# Patient Record
Sex: FEMALE | Race: WHITE | Employment: FULL TIME | URBAN - METROPOLITAN AREA
[De-identification: names, ages, dates, MRNs, and addresses within clinical notes are randomized per-mention and may not be internally consistent; named-entity substitution may affect disease eponyms.]

---

## 2017-01-05 ENCOUNTER — OFFICE VISIT (OUTPATIENT)
Dept: FAMILY MEDICINE CLINIC | Facility: CLINIC | Age: 37
End: 2017-01-05

## 2017-01-05 VITALS
WEIGHT: 132.38 LBS | SYSTOLIC BLOOD PRESSURE: 118 MMHG | HEART RATE: 82 BPM | DIASTOLIC BLOOD PRESSURE: 76 MMHG | HEIGHT: 67 IN | BODY MASS INDEX: 20.78 KG/M2 | RESPIRATION RATE: 16 BRPM

## 2017-01-05 DIAGNOSIS — Z00.00 LABORATORY EXAM ORDERED AS PART OF ROUTINE GENERAL MEDICAL EXAMINATION: ICD-10-CM

## 2017-01-05 DIAGNOSIS — Z51.81 THERAPEUTIC DRUG MONITORING: ICD-10-CM

## 2017-01-05 DIAGNOSIS — F41.1 GAD (GENERALIZED ANXIETY DISORDER): ICD-10-CM

## 2017-01-05 DIAGNOSIS — Z30.09 GENERAL COUNSELING FOR PRESCRIPTION OF ORAL CONTRACEPTIVES: ICD-10-CM

## 2017-01-05 DIAGNOSIS — Z00.00 ROUTINE GENERAL MEDICAL EXAMINATION AT A HEALTH CARE FACILITY: Primary | ICD-10-CM

## 2017-01-05 PROCEDURE — 99395 PREV VISIT EST AGE 18-39: CPT | Performed by: FAMILY MEDICINE

## 2017-01-05 PROCEDURE — 99213 OFFICE O/P EST LOW 20 MIN: CPT | Performed by: FAMILY MEDICINE

## 2017-01-05 RX ORDER — ESCITALOPRAM OXALATE 10 MG/1
10 TABLET ORAL DAILY
Qty: 30 TABLET | Refills: 5 | Status: SHIPPED | OUTPATIENT
Start: 2017-01-05 | End: 2017-08-10

## 2017-01-05 RX ORDER — ERGOCALCIFEROL 1.25 MG/1
CAPSULE ORAL
Refills: 0 | COMMUNITY
Start: 2016-12-01 | End: 2017-03-20 | Stop reason: ALTCHOICE

## 2017-01-05 RX ORDER — LEVONORGESTREL / ETHINYL ESTRADIOL AND ETHINYL ESTRADIOL 150-30(84)
1 KIT ORAL DAILY
Qty: 91 TABLET | Refills: 4 | Status: SHIPPED | OUTPATIENT
Start: 2017-01-05 | End: 2018-04-02

## 2017-01-05 NOTE — PROGRESS NOTES
HPI:   Sami Chatman is a 39year old female who presents for a complete physical exam, anxiety, OCP refill. Symptoms: denies discharge, itching, burning or dysuria, periods are regular.    Last PAP: 2015  Abnormal PAP: years ago  Sexually active: no ELECTRD      COLPOSCOPY,LOOP ELECTRD CERVIX EXCIS      COLPOSCOPY, CERVIX INC UPPER/ADJACENT VAGINA        Family History   Problem Relation Age of Onset   • acute MI[other] [OTHER] Maternal Grandfather    • Heart Disorder Maternal Grandfather    • HTN[oth adenopathy/thyromegaly/masses  LUNGS: clear to auscultation bilateral, no rales, rhonchi or wheezing  CARDIO: RRR without murmur normal S1S2  ABD:  normal bowel sounds,soft, non tender, no masses, HSM or tenderness  MUSCULOSKELETAL: gait normal, no gross M generic Lexapro from 5 mg to 10 mg daily. If doing well okay to wait to follow-up in 6 months. Follow-up sooner if needed. - escitalopram 10 MG Oral Tab; Take 1 tablet (10 mg total) by mouth daily. Dispense: 30 tablet;  Refill: 5            Orders Place

## 2017-01-05 NOTE — PATIENT INSTRUCTIONS
Routine Healthcare for Women   Routine checkups can find treatable problems early. For many medical problems, early treatment can help prevent more serious complications. The value of checkups and how often you have them depend mainly on your age.  Your per history of high cholesterol. Colorectal cancer test: if you are 48 or older.  Recommended tests include a yearly test for blood in the stool, called the fecal occult blood test (FOBT) or fecal immunochemical test (FIT), and one of the following tests:   s history. Many other tests are often done at routine checkups, but there is no current evidence that they are helpful as routine screening tests for healthy women. Examples of such tests are a CBC (complete blood count), thyroid tests, and urine tests.  Wh medical condition, such as diabetes. Varicella (chickenpox) if you have never had chickenpox. Zoster (shingles) vaccine: if you are 60 or older. The vaccine can help prevent shingles. It can also reduce the pain caused by shingles.    What other things

## 2017-03-20 ENCOUNTER — OFFICE VISIT (OUTPATIENT)
Dept: FAMILY MEDICINE CLINIC | Facility: CLINIC | Age: 37
End: 2017-03-20

## 2017-03-20 VITALS
TEMPERATURE: 99 F | OXYGEN SATURATION: 97 % | HEART RATE: 110 BPM | WEIGHT: 134 LBS | BODY MASS INDEX: 21.03 KG/M2 | RESPIRATION RATE: 16 BRPM | DIASTOLIC BLOOD PRESSURE: 66 MMHG | HEIGHT: 67 IN | SYSTOLIC BLOOD PRESSURE: 108 MMHG

## 2017-03-20 DIAGNOSIS — J01.10 ACUTE FRONTAL SINUSITIS, RECURRENCE NOT SPECIFIED: Primary | ICD-10-CM

## 2017-03-20 DIAGNOSIS — H73.891 ERYTHEMA OF TYMPANIC MEMBRANE, RIGHT: ICD-10-CM

## 2017-03-20 PROCEDURE — 99214 OFFICE O/P EST MOD 30 MIN: CPT | Performed by: FAMILY MEDICINE

## 2017-03-20 RX ORDER — BENZONATATE 200 MG/1
200 CAPSULE ORAL 3 TIMES DAILY PRN
Qty: 20 CAPSULE | Refills: 0 | Status: SHIPPED | OUTPATIENT
Start: 2017-03-20 | End: 2017-08-10 | Stop reason: ALTCHOICE

## 2017-03-20 RX ORDER — AZITHROMYCIN 250 MG/1
TABLET, FILM COATED ORAL
Qty: 6 TABLET | Refills: 0 | Status: SHIPPED | OUTPATIENT
Start: 2017-03-20 | End: 2017-08-10 | Stop reason: ALTCHOICE

## 2017-03-20 NOTE — PATIENT INSTRUCTIONS
-- rest, fluids, humidifier, honey  -- continue mucinex as needed  -- start zpak  --benzonatate as needed for cough up to 3x/day  -- call or followup if not improving or worsening

## 2017-03-20 NOTE — PROGRESS NOTES
CC:  Saul Damon is a 39year old female here for Patient presents with:  URI: sinus pressure, sinus headache, cough, feeling tired, and ear pain x 4 weeks       HPI:     URI  -started 4 wks ago  -associated with sinus pressure, headache, cough, and Screening for malignant neoplasm of the bladder    • Dizziness and giddiness    • Pain in joint, shoulder region 5/8/12     right shoulder   • Disorders of bursae and tendons in shoulder region, unspecified 5/8/12   • Bell's palsy 8/15/2013          Past S indicates understanding of these issues and agrees to the plan. The patient is asked to return in prn.   Maty Stoddard MD

## 2017-08-10 ENCOUNTER — OFFICE VISIT (OUTPATIENT)
Dept: FAMILY MEDICINE CLINIC | Facility: CLINIC | Age: 37
End: 2017-08-10

## 2017-08-10 VITALS
BODY MASS INDEX: 21.29 KG/M2 | HEIGHT: 67 IN | WEIGHT: 135.63 LBS | HEART RATE: 87 BPM | SYSTOLIC BLOOD PRESSURE: 118 MMHG | DIASTOLIC BLOOD PRESSURE: 78 MMHG

## 2017-08-10 DIAGNOSIS — Z51.81 THERAPEUTIC DRUG MONITORING: ICD-10-CM

## 2017-08-10 DIAGNOSIS — F41.1 GAD (GENERALIZED ANXIETY DISORDER): ICD-10-CM

## 2017-08-10 PROCEDURE — 99213 OFFICE O/P EST LOW 20 MIN: CPT | Performed by: FAMILY MEDICINE

## 2017-08-10 RX ORDER — ESCITALOPRAM OXALATE 10 MG/1
10 TABLET ORAL DAILY
Qty: 30 TABLET | Refills: 0 | Status: SHIPPED | OUTPATIENT
Start: 2017-08-10 | End: 2017-08-10

## 2017-08-10 RX ORDER — ESCITALOPRAM OXALATE 10 MG/1
10 TABLET ORAL DAILY
Qty: 90 TABLET | Refills: 1 | Status: SHIPPED | OUTPATIENT
Start: 2017-08-10 | End: 2018-02-12

## 2017-08-10 NOTE — PROGRESS NOTES
Sharyn Noyola is a 40year old female. HPI:     RONNIE:  Doing very well with increase dose of escitalopram from 5mg to 10 mg 6 months ago. Sleeping well. Denies mood swings.   23yo daughter going to college in [facility] this fall, she will be living o Cigarettes     Quit date: 6/12/2014  Smokeless tobacco: Never Used                      Alcohol use: No                     REVIEW OF SYSTEMS:   Review of Systems   Constitutional:        Feels well overall   Respiratory: Negative.     Cardiovascular: Negat Visit:  Signed Prescriptions Disp Refills    escitalopram 10 MG Oral Tab 90 tablet 1      Sig: Take 1 tablet (10 mg total) by mouth daily. Return in about 6 months (around 2/10/2018) for Chronic Conditions and as needed.

## 2017-08-10 NOTE — PATIENT INSTRUCTIONS
Understanding Generalized Anxiety Disorder (RONNIE)  Anxiety can fill you with worry and fear. Sometimes anxiety is healthy. It alerts you to a potential threat and prompts you to respond and take action.  But, for some people, anxiety gets so bad it causes © 1630-8977 [address], [address]. All rights reserved. This information is not intended as a substitute for professional medical care. Always follow your healthcare professional's instructions.

## 2018-01-30 ENCOUNTER — OFFICE VISIT (OUTPATIENT)
Dept: FAMILY MEDICINE CLINIC | Facility: CLINIC | Age: 38
End: 2018-01-30

## 2018-01-30 VITALS
SYSTOLIC BLOOD PRESSURE: 112 MMHG | BODY MASS INDEX: 25.02 KG/M2 | WEIGHT: 143 LBS | TEMPERATURE: 98 F | HEIGHT: 63.23 IN | HEART RATE: 92 BPM | DIASTOLIC BLOOD PRESSURE: 72 MMHG

## 2018-01-30 DIAGNOSIS — J02.9 SORE THROAT: Primary | ICD-10-CM

## 2018-01-30 DIAGNOSIS — J06.9 VIRAL URI: ICD-10-CM

## 2018-01-30 DIAGNOSIS — J98.01 BRONCHOSPASM: ICD-10-CM

## 2018-01-30 LAB
CONTROL LINE PRESENT WITH A CLEAR BACKGROUND (YES/NO): YES YES/NO
STREP GRP A CUL-SCR: NEGATIVE

## 2018-01-30 PROCEDURE — 99213 OFFICE O/P EST LOW 20 MIN: CPT | Performed by: FAMILY MEDICINE

## 2018-01-30 PROCEDURE — 87081 CULTURE SCREEN ONLY: CPT | Performed by: FAMILY MEDICINE

## 2018-01-30 PROCEDURE — 87880 STREP A ASSAY W/OPTIC: CPT | Performed by: FAMILY MEDICINE

## 2018-01-30 RX ORDER — ALBUTEROL SULFATE 90 UG/1
2 AEROSOL, METERED RESPIRATORY (INHALATION) EVERY 4 HOURS PRN
Qty: 1 INHALER | Refills: 0 | Status: SHIPPED | OUTPATIENT
Start: 2018-01-30 | End: 2018-02-12

## 2018-01-30 NOTE — PROGRESS NOTES
HPI:   Saul Damon is a 40year old female who presents for upper respiratory symptoms for  3  days.  Patient reports sore throat mild nasal congestion, laryngitis today, fever very low grade, mild chills bodyaches, ear mild pain, no cough, no shortn Packs/day: 0.30      Years: 15.00        Types: Cigarettes     Quit date: 6/12/2014  Smokeless tobacco: Never Used                      Alcohol use:  No                  REVIEW OF SYSTEMS:   GENERAL: CULT, THROAT; Future  - GRP A STREP CULT, THROAT    2. Viral URI  Bronchospasm  Albuterol 2 puffs every 4-6 hours as needed as needed. Patient states that she has a tendency to get some mild bronchospasm when she gets a cold.   Guaifenesin generic (Mucinex

## 2018-02-12 ENCOUNTER — OFFICE VISIT (OUTPATIENT)
Dept: FAMILY MEDICINE CLINIC | Facility: CLINIC | Age: 38
End: 2018-02-12

## 2018-02-12 VITALS
BODY MASS INDEX: 25.55 KG/M2 | SYSTOLIC BLOOD PRESSURE: 120 MMHG | HEIGHT: 63.23 IN | HEART RATE: 88 BPM | DIASTOLIC BLOOD PRESSURE: 72 MMHG | RESPIRATION RATE: 16 BRPM | WEIGHT: 146 LBS

## 2018-02-12 DIAGNOSIS — Z51.81 THERAPEUTIC DRUG MONITORING: Primary | ICD-10-CM

## 2018-02-12 DIAGNOSIS — F41.1 GAD (GENERALIZED ANXIETY DISORDER): ICD-10-CM

## 2018-02-12 PROCEDURE — 99213 OFFICE O/P EST LOW 20 MIN: CPT | Performed by: FAMILY MEDICINE

## 2018-02-12 RX ORDER — ESCITALOPRAM OXALATE 10 MG/1
10 TABLET ORAL DAILY
Qty: 90 TABLET | Refills: 1 | Status: SHIPPED | OUTPATIENT
Start: 2018-02-12 | End: 2018-05-03

## 2018-02-13 NOTE — PROGRESS NOTES
Tina Sumner is a 40year old female. HPI:     RONNIE:  Doing very well. Sleeping well. Denies mood swings. Current Outpatient Prescriptions:  escitalopram 10 MG Oral Tab Take 1 tablet (10 mg total) by mouth daily.  Disp: 90 tablet Rfl: 1 120/72 (BP Location: Left arm, Patient Position: Sitting, Cuff Size: adult)   Pulse 88   Resp 16   Ht 63.23\"   Wt 146 lb   LMP 12/13/2017   BMI 25.67 kg/m²  Estimated body mass index is 25.67 kg/m² as calculated from the following:    Height as of this en

## 2018-03-14 ENCOUNTER — CHARTING TRANS (OUTPATIENT)
Dept: OTHER | Age: 38
End: 2018-03-14

## 2018-04-02 ENCOUNTER — OFFICE VISIT (OUTPATIENT)
Dept: FAMILY MEDICINE CLINIC | Facility: CLINIC | Age: 38
End: 2018-04-02

## 2018-04-02 VITALS
DIASTOLIC BLOOD PRESSURE: 76 MMHG | RESPIRATION RATE: 16 BRPM | BODY MASS INDEX: 23.22 KG/M2 | HEIGHT: 66.54 IN | WEIGHT: 146.19 LBS | SYSTOLIC BLOOD PRESSURE: 110 MMHG | HEART RATE: 80 BPM

## 2018-04-02 DIAGNOSIS — Z12.4 SCREENING FOR MALIGNANT NEOPLASM OF CERVIX: ICD-10-CM

## 2018-04-02 DIAGNOSIS — Z86.19 HISTORY OF HPV INFECTION: ICD-10-CM

## 2018-04-02 DIAGNOSIS — Z00.00 ROUTINE GENERAL MEDICAL EXAMINATION AT A HEALTH CARE FACILITY: ICD-10-CM

## 2018-04-02 DIAGNOSIS — J01.00 ACUTE NON-RECURRENT MAXILLARY SINUSITIS: ICD-10-CM

## 2018-04-02 DIAGNOSIS — Z30.09 GENERAL COUNSELING FOR PRESCRIPTION OF ORAL CONTRACEPTIVES: ICD-10-CM

## 2018-04-02 DIAGNOSIS — J30.2 SEASONAL ALLERGIC RHINITIS, UNSPECIFIED TRIGGER: ICD-10-CM

## 2018-04-02 DIAGNOSIS — Z23 NEED FOR VACCINATION: ICD-10-CM

## 2018-04-02 DIAGNOSIS — N84.1 CERVICAL POLYP: ICD-10-CM

## 2018-04-02 DIAGNOSIS — Z01.411 ENCOUNTER FOR GYNECOLOGICAL EXAMINATION WITH ABNORMAL FINDING: Primary | ICD-10-CM

## 2018-04-02 DIAGNOSIS — Z00.00 LABORATORY EXAM ORDERED AS PART OF ROUTINE GENERAL MEDICAL EXAMINATION: ICD-10-CM

## 2018-04-02 PROCEDURE — 90471 IMMUNIZATION ADMIN: CPT | Performed by: FAMILY MEDICINE

## 2018-04-02 PROCEDURE — 99395 PREV VISIT EST AGE 18-39: CPT | Performed by: FAMILY MEDICINE

## 2018-04-02 PROCEDURE — 99213 OFFICE O/P EST LOW 20 MIN: CPT | Performed by: FAMILY MEDICINE

## 2018-04-02 PROCEDURE — 88175 CYTOPATH C/V AUTO FLUID REDO: CPT | Performed by: FAMILY MEDICINE

## 2018-04-02 PROCEDURE — 90715 TDAP VACCINE 7 YRS/> IM: CPT | Performed by: FAMILY MEDICINE

## 2018-04-02 PROCEDURE — 87624 HPV HI-RISK TYP POOLED RSLT: CPT | Performed by: FAMILY MEDICINE

## 2018-04-02 RX ORDER — MONTELUKAST SODIUM 10 MG/1
10 TABLET ORAL NIGHTLY
Qty: 90 TABLET | Refills: 1 | Status: SHIPPED | OUTPATIENT
Start: 2018-04-02 | End: 2018-05-03

## 2018-04-02 RX ORDER — METHYLPREDNISOLONE 4 MG/1
TABLET ORAL
Qty: 1 KIT | Refills: 0 | Status: SHIPPED | OUTPATIENT
Start: 2018-04-02 | End: 2018-05-03 | Stop reason: ALTCHOICE

## 2018-04-02 RX ORDER — AZITHROMYCIN 250 MG/1
TABLET, FILM COATED ORAL
Qty: 6 TABLET | Refills: 0 | Status: SHIPPED | OUTPATIENT
Start: 2018-04-02 | End: 2018-05-03 | Stop reason: ALTCHOICE

## 2018-04-02 RX ORDER — LEVONORGESTREL / ETHINYL ESTRADIOL AND ETHINYL ESTRADIOL 150-30(84)
1 KIT ORAL DAILY
Qty: 91 TABLET | Refills: 4 | Status: SHIPPED | OUTPATIENT
Start: 2018-04-02 | End: 2019-05-25

## 2018-04-02 NOTE — PROGRESS NOTES
HPI:   Miller Castaneda is a 40year old female who presents for her annual wellness visit, upper respiratory symptoms, and allergies. Symptoms: denies discharge, itching, burning or dysuria, periods are irregular.    Last PAP: 10/2015  Abnormal PAP: ye tablet Rfl: 1   Levonorgest-Eth Estrad 91-Day (CAMRESE) 0.15-0.03 &0.01 MG Oral Tab Take 1 tablet by mouth daily. Disp: 91 tablet Rfl: 4   escitalopram 10 MG Oral Tab Take 1 tablet (10 mg total) by mouth daily.  Disp: 90 tablet Rfl: 1   Hydroxychloroquine S abdominal pain, bowel movement changes, blood in stool  : denies urinary problems, vaginal discharge or discomfort,  periods regular no  MUSCULOSKELETAL: denies joint pain or stiffness  NEURO: denies headaches, tingling or dizziness  PSYCH: denies depres Date(s) Administered    TDAP                  04/02/2018          ASSESSMENT AND PLAN:   Bryon Tariq is a 40year old female who presents for a complete physical exam.        Encounter for gynecological examination with abnormal finding  (primary en THIN PREP COLLECTION; Future  - THINPREP PAP SMEAR B  - HPV HIGH RISK , THIN PREP COLLECTION    8. Screening for malignant neoplasm of cervix  - THINPREP PAP SMEAR B; Future  - HPV HIGH RISK , THIN PREP COLLECTION;  Future  - THINPREP PAP SMEAR B  - HPV HIG Take 1 tablet (10 mg total) by mouth nightly. Levonorgest-Eth Estrad 91-Day (CAMRESE) 0.15-0.03 &0.01 MG Oral Tab 91 tablet 4      Sig: Take 1 tablet by mouth daily.            Imaging & Consults:  TETANUS, DIPHTHERIA TOXOIDS AND ACELLULAR PERTUSIS VAC

## 2018-04-07 ENCOUNTER — LAB ENCOUNTER (OUTPATIENT)
Dept: LAB | Age: 38
End: 2018-04-07
Attending: FAMILY MEDICINE
Payer: COMMERCIAL

## 2018-04-07 DIAGNOSIS — J30.2 SEASONAL ALLERGIC RHINITIS, UNSPECIFIED TRIGGER: ICD-10-CM

## 2018-04-07 DIAGNOSIS — Z00.00 LABORATORY EXAM ORDERED AS PART OF ROUTINE GENERAL MEDICAL EXAMINATION: ICD-10-CM

## 2018-04-07 PROCEDURE — 82306 VITAMIN D 25 HYDROXY: CPT | Performed by: FAMILY MEDICINE

## 2018-04-07 PROCEDURE — 80050 GENERAL HEALTH PANEL: CPT | Performed by: FAMILY MEDICINE

## 2018-04-07 PROCEDURE — 84439 ASSAY OF FREE THYROXINE: CPT | Performed by: FAMILY MEDICINE

## 2018-04-07 PROCEDURE — 80061 LIPID PANEL: CPT | Performed by: FAMILY MEDICINE

## 2018-04-07 PROCEDURE — 86003 ALLG SPEC IGE CRUDE XTRC EA: CPT | Performed by: FAMILY MEDICINE

## 2018-04-07 PROCEDURE — 82785 ASSAY OF IGE: CPT | Performed by: FAMILY MEDICINE

## 2018-04-07 PROCEDURE — 36415 COLL VENOUS BLD VENIPUNCTURE: CPT | Performed by: FAMILY MEDICINE

## 2018-05-03 ENCOUNTER — OFFICE VISIT (OUTPATIENT)
Dept: FAMILY MEDICINE CLINIC | Facility: CLINIC | Age: 38
End: 2018-05-03

## 2018-05-03 VITALS
WEIGHT: 143.81 LBS | DIASTOLIC BLOOD PRESSURE: 72 MMHG | HEART RATE: 88 BPM | BODY MASS INDEX: 22.84 KG/M2 | HEIGHT: 66.54 IN | SYSTOLIC BLOOD PRESSURE: 114 MMHG

## 2018-05-03 DIAGNOSIS — J30.89 PERENNIAL ALLERGIC RHINITIS WITH SEASONAL VARIATION: ICD-10-CM

## 2018-05-03 DIAGNOSIS — Z51.81 THERAPEUTIC DRUG MONITORING: Primary | ICD-10-CM

## 2018-05-03 DIAGNOSIS — J01.00 ACUTE NON-RECURRENT MAXILLARY SINUSITIS: ICD-10-CM

## 2018-05-03 DIAGNOSIS — E55.9 VITAMIN D DEFICIENCY: ICD-10-CM

## 2018-05-03 DIAGNOSIS — J30.2 PERENNIAL ALLERGIC RHINITIS WITH SEASONAL VARIATION: ICD-10-CM

## 2018-05-03 DIAGNOSIS — F41.1 GAD (GENERALIZED ANXIETY DISORDER): ICD-10-CM

## 2018-05-03 PROCEDURE — 99214 OFFICE O/P EST MOD 30 MIN: CPT | Performed by: FAMILY MEDICINE

## 2018-05-03 RX ORDER — MONTELUKAST SODIUM 10 MG/1
10 TABLET ORAL NIGHTLY
Qty: 90 TABLET | Refills: 3 | Status: SHIPPED | OUTPATIENT
Start: 2018-05-03 | End: 2019-05-25

## 2018-05-03 RX ORDER — ACETAMINOPHEN 160 MG
2000 TABLET,DISINTEGRATING ORAL DAILY
COMMUNITY
End: 2019-04-30

## 2018-05-03 RX ORDER — ESCITALOPRAM OXALATE 10 MG/1
10 TABLET ORAL DAILY
Qty: 90 TABLET | Refills: 3 | Status: SHIPPED | OUTPATIENT
Start: 2018-05-03 | End: 2019-04-30 | Stop reason: DRUGHIGH

## 2018-05-03 NOTE — PROGRESS NOTES
Nissa Fonseca is a 40year old female. HPI:     Allergic rhinitis:  Allergy symptoms on the Singulair have improved. Allergy test results most significant for cats. Allergy test also positive for dogs and dust mites.   Patient does have cats and Packs/day: 0.30      Years: 15.00        Types: Cigarettes     Quit date: 6/12/2014  Smokeless tobacco: Never Used                      Alcohol use:  No                  Family History   Problem Relation Age of Onset   • acute MI[other] [O Albumin 4.1 3.5 - 4.8 g/dL   Sodium 142 136 - 144 mmol/L   Potassium 3.9 3.6 - 5.1 mmol/L   Chloride 107 101 - 111 mmol/L   CO2 27.0 22.0 - 32.0 mmol/L   -LIPID PANEL   Collection Time: 04/07/18  7:07 AM   Result Value Ref Range   Cholesterol, Total 183 Allergen, Natasha Avers IgE <0.10 <=0.34 kU/L   Allergen, D.  Farinae IgE 2.96 (H) <=0.34 kU/L   Allergen, Syriac Cockroach IgE <0.10 <=0.34 kU/L   Allergen, Box Elder/Maple IgE <0.10 <=0.34 kU/L   Immunoglobulin E 41 <=214 kU/L   Allergen, Hormodendrum IgE patient will continue the Singulair. Okay to take OTC antihistamines as well. Avoid known triggers. - Montelukast Sodium 10 MG Oral Tab; Take 1 tablet (10 mg total) by mouth nightly. Dispense: 90 tablet; Refill: 3    4.  Vitamin D deficiency  Patient

## 2018-05-24 ENCOUNTER — OFFICE VISIT (OUTPATIENT)
Dept: OBGYN CLINIC | Facility: CLINIC | Age: 38
End: 2018-05-24

## 2018-05-24 VITALS
WEIGHT: 145 LBS | DIASTOLIC BLOOD PRESSURE: 70 MMHG | HEIGHT: 66 IN | BODY MASS INDEX: 23.3 KG/M2 | SYSTOLIC BLOOD PRESSURE: 110 MMHG

## 2018-05-24 DIAGNOSIS — N84.1 CERVICAL POLYP: Primary | ICD-10-CM

## 2018-05-24 PROCEDURE — 88305 TISSUE EXAM BY PATHOLOGIST: CPT | Performed by: OBSTETRICS & GYNECOLOGY

## 2018-05-24 PROCEDURE — 99204 OFFICE O/P NEW MOD 45 MIN: CPT | Performed by: OBSTETRICS & GYNECOLOGY

## 2018-05-24 NOTE — PROGRESS NOTES
[facility]  Obstetrics and Gynecology Referral  History & Physical    CC: Patient is a new patient and referred for cervical polyp     Subjective:     HPI: Cintia Doyle is a 40year old  female referred for cervical polyp.  Patient re ELECTRD CERVIX EXCIS  No date: CONIZATION CERVIX,LOOP ELECTRD    Social History:    Social History  Social History   Marital status: Single  Spouse name: N/A    Years of education: N/A  Number of children: N/A     Occupational History  None on file     Soc was informed of risks including but not limited to the risk of bleeding, infection, injury and insufficient tissue collection. All questions and concerns were addressed.  The patient provided verbal and written consent.      The patient was placed in a supi on counseling/coordination of care: 40 min    Total time spent with patient: 40 min        RTC in PRN or sooner if needed     Liliya Queen MD   [facility] - OBGYN

## 2018-11-01 VITALS
HEART RATE: 85 BPM | WEIGHT: 144 LBS | DIASTOLIC BLOOD PRESSURE: 80 MMHG | SYSTOLIC BLOOD PRESSURE: 120 MMHG | RESPIRATION RATE: 18 BRPM | HEIGHT: 67 IN | BODY MASS INDEX: 22.6 KG/M2 | TEMPERATURE: 99.1 F | OXYGEN SATURATION: 98 %

## 2019-04-30 ENCOUNTER — OFFICE VISIT (OUTPATIENT)
Dept: FAMILY MEDICINE CLINIC | Facility: CLINIC | Age: 39
End: 2019-04-30
Payer: COMMERCIAL

## 2019-04-30 VITALS
OXYGEN SATURATION: 99 % | WEIGHT: 147 LBS | SYSTOLIC BLOOD PRESSURE: 118 MMHG | HEART RATE: 95 BPM | DIASTOLIC BLOOD PRESSURE: 68 MMHG | HEIGHT: 66 IN | BODY MASS INDEX: 23.63 KG/M2

## 2019-04-30 DIAGNOSIS — J30.1 SEASONAL ALLERGIC RHINITIS DUE TO POLLEN: ICD-10-CM

## 2019-04-30 DIAGNOSIS — F41.1 GAD (GENERALIZED ANXIETY DISORDER): ICD-10-CM

## 2019-04-30 DIAGNOSIS — Z51.81 THERAPEUTIC DRUG MONITORING: Primary | ICD-10-CM

## 2019-04-30 PROCEDURE — 99214 OFFICE O/P EST MOD 30 MIN: CPT | Performed by: FAMILY MEDICINE

## 2019-04-30 RX ORDER — IPRATROPIUM BROMIDE 42 UG/1
2 SPRAY, METERED NASAL 3 TIMES DAILY
Qty: 1 BOTTLE | Refills: 3 | Status: SHIPPED | OUTPATIENT
Start: 2019-04-30 | End: 2020-12-28

## 2019-04-30 RX ORDER — FLUTICASONE PROPIONATE 50 MCG
2 SPRAY, SUSPENSION (ML) NASAL DAILY
Qty: 1 BOTTLE | Refills: 3 | Status: SHIPPED | OUTPATIENT
Start: 2019-04-30

## 2019-04-30 RX ORDER — ESCITALOPRAM OXALATE 20 MG/1
20 TABLET ORAL DAILY
Qty: 30 TABLET | Refills: 3 | Status: SHIPPED | OUTPATIENT
Start: 2019-04-30 | End: 2019-06-15

## 2019-04-30 RX ORDER — ALBUTEROL SULFATE 90 UG/1
2 AEROSOL, METERED RESPIRATORY (INHALATION) EVERY 6 HOURS PRN
COMMUNITY
End: 2021-01-08

## 2019-04-30 NOTE — PATIENT INSTRUCTIONS
Okay to take OTC Claritin in addition to the montelukast.    Make an appointment to see the counselor.

## 2019-04-30 NOTE — PROGRESS NOTES
Pool Oswald is a 45year old female. HPI:       Anxiety:  Worsening anxiety. Pt has a counselor in mind that is in network that she plans on seeing. Currently taking Lexapro 10 mg daily.   Tolerating medication well, no side effects to the [facility] COLPOSCOPY, CERVIX INC UPPER/ADJACENT VAGINA     • COLPOSCOPY,LOOP ELECTRD CERVIX EXCIS     • CONIZATION CERVIX,LOOP ELECTRD        Social History:    Social History    Tobacco Use      Smoking status: Former Smoker        Packs/day: 0.30        Years: 13. mm/S3/S4  GI: normal bowel sounds, NT/ND, no pulsations, no r/r/g, no masses, no HSM  EXTREMITIES: no cyanosis or clubbing  NEURO: Alert and Oriented x3, CN II-XII grossly intact, no focal weakness  PSYCH: affect normal, normal thought content.           AS (around 6/11/2019) for Therapeutic drug monitoring and increase of Lexapro/escitalopram.

## 2019-05-25 DIAGNOSIS — Z30.09 GENERAL COUNSELING FOR PRESCRIPTION OF ORAL CONTRACEPTIVES: ICD-10-CM

## 2019-05-28 RX ORDER — LEVONORGESTREL / ETHINYL ESTRADIOL AND ETHINYL ESTRADIOL 150-30(84)
KIT ORAL
Qty: 1 PACKAGE | Refills: 0 | Status: SHIPPED | OUTPATIENT
Start: 2019-05-28 | End: 2019-08-27

## 2019-05-28 RX ORDER — MONTELUKAST SODIUM 10 MG/1
TABLET ORAL
Qty: 30 TABLET | Refills: 0 | Status: SHIPPED | OUTPATIENT
Start: 2019-05-28 | End: 2019-05-29

## 2019-05-29 RX ORDER — MONTELUKAST SODIUM 10 MG/1
TABLET ORAL
Qty: 90 TABLET | Refills: 0 | Status: SHIPPED | OUTPATIENT
Start: 2019-05-29 | End: 2019-06-15

## 2019-06-15 ENCOUNTER — OFFICE VISIT (OUTPATIENT)
Dept: FAMILY MEDICINE CLINIC | Facility: CLINIC | Age: 39
End: 2019-06-15
Payer: COMMERCIAL

## 2019-06-15 VITALS
HEIGHT: 66 IN | WEIGHT: 143 LBS | SYSTOLIC BLOOD PRESSURE: 110 MMHG | DIASTOLIC BLOOD PRESSURE: 64 MMHG | BODY MASS INDEX: 22.98 KG/M2 | HEART RATE: 96 BPM

## 2019-06-15 DIAGNOSIS — J30.89 PERENNIAL ALLERGIC RHINITIS: ICD-10-CM

## 2019-06-15 DIAGNOSIS — Z79.899 ENCOUNTER FOR LONG-TERM CURRENT USE OF MEDICATION: ICD-10-CM

## 2019-06-15 DIAGNOSIS — F41.1 GAD (GENERALIZED ANXIETY DISORDER): ICD-10-CM

## 2019-06-15 DIAGNOSIS — Z51.81 THERAPEUTIC DRUG MONITORING: Primary | ICD-10-CM

## 2019-06-15 DIAGNOSIS — J01.00 ACUTE NON-RECURRENT MAXILLARY SINUSITIS: ICD-10-CM

## 2019-06-15 PROCEDURE — 99214 OFFICE O/P EST MOD 30 MIN: CPT | Performed by: FAMILY MEDICINE

## 2019-06-15 RX ORDER — ESCITALOPRAM OXALATE 20 MG/1
20 TABLET ORAL DAILY
Qty: 90 TABLET | Refills: 1 | Status: SHIPPED | OUTPATIENT
Start: 2019-06-15 | End: 2019-12-30

## 2019-06-15 RX ORDER — AZITHROMYCIN 250 MG/1
TABLET, FILM COATED ORAL
Qty: 6 TABLET | Refills: 0 | Status: SHIPPED | OUTPATIENT
Start: 2019-06-15 | End: 2019-12-30 | Stop reason: ALTCHOICE

## 2019-06-15 RX ORDER — MONTELUKAST SODIUM 10 MG/1
10 TABLET ORAL NIGHTLY
Qty: 90 TABLET | Refills: 3 | Status: SHIPPED | OUTPATIENT
Start: 2019-06-15 | End: 2020-05-18

## 2019-06-15 NOTE — PATIENT INSTRUCTIONS
Take OTC Claritin nightly, okay to take store brand. Please make an appointment for your annual wellness visit.

## 2019-06-15 NOTE — PROGRESS NOTES
Rudy Sarmiento is a 44year old female. HPI:       Anxiety:  Much improved with increase of generic Lexapro to 20 mg daily. Seeing a counselor. Tolerating medication well, no side effects to the medication noticed. Allergies: Worsening.   Ta 5/3/2018      Past Surgical History:   Procedure Laterality Date   • COLPOSCOPY, CERVIX INC UPPER/ADJACENT VAGINA     • COLPOSCOPY,LOOP ELECTRD CERVIX EXCIS     • CONIZATION CERVIX,LOOP ELECTRD        Social History:    Social History    Tobacco Use      S mm/S3/S4  GI: normal bowel sounds, NT/ND, no pulsations, no r/r/g, no masses, no HSM  EXTREMITIES: no cyanosis or clubbing  NEURO: Alert and Oriented x3, CN II-XII grossly intact, no focal weakness  PSYCH: affect normal, normal thought content.           AS

## 2019-08-27 DIAGNOSIS — Z30.09 GENERAL COUNSELING FOR PRESCRIPTION OF ORAL CONTRACEPTIVES: ICD-10-CM

## 2019-08-27 RX ORDER — LEVONORGESTREL / ETHINYL ESTRADIOL AND ETHINYL ESTRADIOL 150-30(84)
KIT ORAL
Qty: 91 TABLET | Refills: 0 | Status: SHIPPED | OUTPATIENT
Start: 2019-08-27 | End: 2019-12-30

## 2019-08-27 NOTE — TELEPHONE ENCOUNTER
Pt requesting refill of LEVONORGEST-ETH ESTRAD 91-DAY 0.15-0.03 &0.01 MG Oral Tab, passed protocol , refill approved, sent to pharmacy

## 2019-12-30 ENCOUNTER — OFFICE VISIT (OUTPATIENT)
Dept: FAMILY MEDICINE CLINIC | Facility: CLINIC | Age: 39
End: 2019-12-30
Payer: COMMERCIAL

## 2019-12-30 VITALS
WEIGHT: 142 LBS | HEIGHT: 67 IN | OXYGEN SATURATION: 97 % | SYSTOLIC BLOOD PRESSURE: 114 MMHG | TEMPERATURE: 99 F | BODY MASS INDEX: 22.29 KG/M2 | DIASTOLIC BLOOD PRESSURE: 66 MMHG | HEART RATE: 95 BPM

## 2019-12-30 DIAGNOSIS — Z00.00 ROUTINE GENERAL MEDICAL EXAMINATION AT A HEALTH CARE FACILITY: Primary | ICD-10-CM

## 2019-12-30 DIAGNOSIS — Z30.09 GENERAL COUNSELING FOR PRESCRIPTION OF ORAL CONTRACEPTIVES: ICD-10-CM

## 2019-12-30 DIAGNOSIS — R06.2 INSPIRATORY WHEEZE ON EXAMINATION: ICD-10-CM

## 2019-12-30 DIAGNOSIS — Z12.31 ENCOUNTER FOR SCREENING MAMMOGRAM FOR MALIGNANT NEOPLASM OF BREAST: ICD-10-CM

## 2019-12-30 DIAGNOSIS — R09.89 ABNORMAL LUNG SOUNDS: ICD-10-CM

## 2019-12-30 PROBLEM — Z01.411 ENCOUNTER FOR GYNECOLOGICAL EXAMINATION WITH ABNORMAL FINDING: Status: RESOLVED | Noted: 2018-04-02 | Resolved: 2019-12-30

## 2019-12-30 PROCEDURE — 99214 OFFICE O/P EST MOD 30 MIN: CPT | Performed by: FAMILY MEDICINE

## 2019-12-30 PROCEDURE — 94640 AIRWAY INHALATION TREATMENT: CPT | Performed by: FAMILY MEDICINE

## 2019-12-30 PROCEDURE — 99395 PREV VISIT EST AGE 18-39: CPT | Performed by: FAMILY MEDICINE

## 2019-12-30 RX ORDER — LEVONORGESTREL / ETHINYL ESTRADIOL AND ETHINYL ESTRADIOL 150-30(84)
1 KIT ORAL DAILY
Qty: 91 TABLET | Refills: 3 | Status: SHIPPED | OUTPATIENT
Start: 2019-12-30 | End: 2019-12-31

## 2019-12-30 RX ORDER — ALBUTEROL SULFATE 2.5 MG/3ML
2.5 SOLUTION RESPIRATORY (INHALATION) ONCE
Status: COMPLETED | OUTPATIENT
Start: 2019-12-30 | End: 2019-12-30

## 2019-12-30 RX ORDER — ALPRAZOLAM 0.25 MG/1
0.25 TABLET ORAL DAILY PRN
COMMUNITY

## 2019-12-30 RX ADMIN — ALBUTEROL SULFATE 2.5 MG: 2.5 SOLUTION RESPIRATORY (INHALATION) at 18:24:00

## 2019-12-31 RX ORDER — LEVONORGESTREL / ETHINYL ESTRADIOL AND ETHINYL ESTRADIOL 150-30(84)
KIT ORAL
Qty: 91 TABLET | Refills: 1 | Status: SHIPPED | OUTPATIENT
Start: 2019-12-31 | End: 2020-05-12

## 2019-12-31 NOTE — PATIENT INSTRUCTIONS
Prevention Guidelines, Women Ages 25 to 44  Screening tests and vaccines are an important part of managing your health. A screening test is done to find possible disorders or diseases in people who don't have any symptoms.  The goal is to find a disease e Type 2 diabetes, prediabetes All women diagnosed with gestational diabetes Lifelong testing every 3 years   Type 2 diabetes All women with prediabetes Every year   Gonorrhea Sexually active women at increased risk for infection At routine exams   Hepatitis Measles, mumps, rubella (MMR) All women in this age group who have no record of these infections or vaccines 1 or 2 doses   Meningococcal Women at increased risk for infection should talk with their healthcare provider 1 or more doses   Pneumococcal conjug © 1951-5515 [address] [facility], [facility] [place]. All rights reserved. This information is not intended as a substitute for professional medical care. Always follow your healthcare professional's instructions.

## 2019-12-31 NOTE — TELEPHONE ENCOUNTER
Pt requesting refill of LEVONORGEST-ETH ESTRAD 91-DAY 0.15-0.03 &0.01 MG Oral Tab    Passed protocol, refill approved, sent to pharmacy

## 2019-12-31 NOTE — PROGRESS NOTES
HPI:   Brittani Amado is a 44year old female   Symptoms: sometimes has break through bleeding on the OCP  Last PAP: 2018  Abnormal PAP: 9-10 years ago, normal thereafter    Abnormal lung sounds heard on physical exam, when discussing this with patient puffs into the lungs every 6 (six) hours as needed for Wheezing. • Fluticasone Propionate 50 MCG/ACT Nasal Suspension 2 sprays by Each Nare route daily.  (Patient not taking: Reported on 12/30/2019 ) 1 Bottle 3   • Ipratropium Bromide 0.06 % Nasal Solut abdominal pain, bowel movement changes, blood in stool  : denies urinary problems, vaginal discharge or discomfort  MUSCULOSKELETAL: denies joint pain or stiffness  NEURO: denies headaches, tingling or dizziness  PSYCH: denies depression or anxiety  HEMA for screening mammogram for malignant neoplasm of breast  Abnormal lung sounds  Inspiratory wheeze on examination      1. Routine general medical examination at a health care facility  Patient provided handout on women's health and prevention.    Recommend Health maintenance guidance provided including vision and dental exams. Patient counseled on age appropriate calcium and vitamin D intake. Lifestyle guidance provided. The patient indicates understanding of these issues and agrees to the plan.   The

## 2020-01-01 ENCOUNTER — EXTERNAL RECORD (OUTPATIENT)
Dept: HEALTH INFORMATION MANAGEMENT | Age: 40
End: 2020-01-01

## 2020-01-18 PROBLEM — R09.89 ABNORMAL LUNG SOUNDS: Status: ACTIVE | Noted: 2020-01-18

## 2020-01-18 PROBLEM — R06.2 INSPIRATORY WHEEZE ON EXAMINATION: Status: ACTIVE | Noted: 2020-01-18

## 2020-05-12 ENCOUNTER — PATIENT MESSAGE (OUTPATIENT)
Dept: FAMILY MEDICINE CLINIC | Facility: CLINIC | Age: 40
End: 2020-05-12

## 2020-05-12 DIAGNOSIS — Z30.09 GENERAL COUNSELING FOR PRESCRIPTION OF ORAL CONTRACEPTIVES: ICD-10-CM

## 2020-05-12 RX ORDER — LEVONORGESTREL / ETHINYL ESTRADIOL AND ETHINYL ESTRADIOL 150-30(84)
1 KIT ORAL DAILY
Qty: 91 TABLET | Refills: 2 | Status: SHIPPED | OUTPATIENT
Start: 2020-05-12 | End: 2020-09-10

## 2020-05-12 NOTE — TELEPHONE ENCOUNTER
From: Tina Sumner  To: Sammy Chen DO  Sent: 5/12/2020 10:58 AM CDT  Subject: Prescription Question    Good morning,    I need to have my birth control script [facility] [place]. For some reason they do not have it for the new year.

## 2020-05-14 ENCOUNTER — PATIENT MESSAGE (OUTPATIENT)
Dept: FAMILY MEDICINE CLINIC | Facility: CLINIC | Age: 40
End: 2020-05-14

## 2020-05-15 NOTE — TELEPHONE ENCOUNTER
From: Miller Castaneda  To: Nilsa Rice DO  Sent: 5/14/2020 5:55 PM CDT  Subject: Non-Urgent Medical Question    I have a question regarding these sinus headaches I keep getting. I'm going on week 2. I'm taking the singular and Claritin.  I do take s

## 2020-05-18 ENCOUNTER — VIRTUAL PHONE E/M (OUTPATIENT)
Dept: FAMILY MEDICINE CLINIC | Facility: CLINIC | Age: 40
End: 2020-05-18
Payer: COMMERCIAL

## 2020-05-18 DIAGNOSIS — J30.89 PERENNIAL ALLERGIC RHINITIS WITH SEASONAL VARIATION: ICD-10-CM

## 2020-05-18 DIAGNOSIS — J01.40 ACUTE NON-RECURRENT PANSINUSITIS: Primary | ICD-10-CM

## 2020-05-18 DIAGNOSIS — Z79.899 ENCOUNTER FOR LONG-TERM CURRENT USE OF MEDICATION: ICD-10-CM

## 2020-05-18 DIAGNOSIS — J30.2 PERENNIAL ALLERGIC RHINITIS WITH SEASONAL VARIATION: ICD-10-CM

## 2020-05-18 PROCEDURE — 99214 OFFICE O/P EST MOD 30 MIN: CPT | Performed by: FAMILY MEDICINE

## 2020-05-18 RX ORDER — SERTRALINE HYDROCHLORIDE 100 MG/1
200 TABLET, FILM COATED ORAL DAILY
COMMUNITY
Start: 2020-03-28

## 2020-05-18 RX ORDER — AZITHROMYCIN 250 MG/1
TABLET, FILM COATED ORAL
Qty: 6 TABLET | Refills: 0 | Status: SHIPPED | OUTPATIENT
Start: 2020-05-18 | End: 2020-05-23

## 2020-05-18 RX ORDER — TRAZODONE HYDROCHLORIDE 50 MG/1
TABLET ORAL
COMMUNITY
Start: 2020-04-26

## 2020-05-18 RX ORDER — MONTELUKAST SODIUM 10 MG/1
10 TABLET ORAL NIGHTLY
Qty: 90 TABLET | Refills: 3 | Status: SHIPPED | OUTPATIENT
Start: 2020-05-18 | End: 2021-07-19

## 2020-05-18 NOTE — PROGRESS NOTES
Virtual/Telephone Check-In    Main Ashleyliff verbally consents to a Virtual/Telephone Check-In service on 05/18/20.   Patient understands and accepts financial responsibility for any deductible, co-insurance and/or co-pays associated with this service nightly. 90 tablet 3   • Levonorgest-Eth Estrad 91-Day 0.15-0.03 &0.01 MG Oral Tab Take 1 tablet by mouth daily. 91 tablet 2   • ALPRAZolam 0.25 MG Oral Tab Take 0.25 mg by mouth daily as needed.      • Albuterol Sulfate HFA (PROAIR HFA) 108 (90 Base) MCG/A discharge that is crusty. HEENT: Denies sore throat. Difficult to breathe through nose at times.   LUNGS: Denies cough or shortness of breath  CARDIOVASCULAR: Denies chest pain or palpitations  GI: Denies nausea, vomiting, or diarrhea  NEURO: Denies  [facility] 0     Sig: Take 2 tablets (500 mg total) by mouth daily for 1 day, THEN 1 tablet (250 mg total) daily for 4 days. • Montelukast Sodium 10 MG Oral Tab 90 tablet 3     Sig: Take 1 tablet (10 mg total) by mouth nightly.        Return in about 7 months (Deras Griselda

## 2020-06-03 ENCOUNTER — TELEMEDICINE (OUTPATIENT)
Dept: FAMILY MEDICINE CLINIC | Facility: CLINIC | Age: 40
End: 2020-06-03
Payer: COMMERCIAL

## 2020-06-03 DIAGNOSIS — J30.89 PERENNIAL ALLERGIC RHINITIS: ICD-10-CM

## 2020-06-03 DIAGNOSIS — B02.9 HERPES ZOSTER WITHOUT COMPLICATION: Primary | ICD-10-CM

## 2020-06-03 DIAGNOSIS — J01.41 ACUTE RECURRENT PANSINUSITIS: ICD-10-CM

## 2020-06-03 PROCEDURE — 99214 OFFICE O/P EST MOD 30 MIN: CPT | Performed by: FAMILY MEDICINE

## 2020-06-03 RX ORDER — VALACYCLOVIR HYDROCHLORIDE 1 G/1
1 TABLET, FILM COATED ORAL 3 TIMES DAILY
Qty: 21 TABLET | Refills: 0 | Status: SHIPPED | OUTPATIENT
Start: 2020-06-03 | End: 2020-06-10

## 2020-06-03 RX ORDER — AMOXICILLIN AND CLAVULANATE POTASSIUM 875; 125 MG/1; MG/1
1 TABLET, FILM COATED ORAL EVERY 12 HOURS
Qty: 20 TABLET | Refills: 0 | Status: SHIPPED | OUTPATIENT
Start: 2020-06-03 | End: 2020-06-13

## 2020-06-03 RX ORDER — METHYLPREDNISOLONE 4 MG/1
TABLET ORAL
Qty: 1 KIT | Refills: 0 | Status: SHIPPED | OUTPATIENT
Start: 2020-06-03 | End: 2020-08-21 | Stop reason: ALTCHOICE

## 2020-06-03 NOTE — PROGRESS NOTES
Video visit with live two way video and audio via [address] verbally consents to a Video visit with live two way video and audio via [employer] on 06/03/20.   Patient has been referred to the [facility] website at [email or web address] t Amoxicillin-Pot Clavulanate 875-125 MG Oral Tab Take 1 tablet by mouth Q12H for 10 days. 20 tablet 0   • methylPREDNISolone (MEDROL) 4 MG Oral Tablet Therapy Pack Take each day's dose as one daily dose q am with food or milk for 6 days.  1 kit 0   • Sertral right shoulder   • Perennial allergic rhinitis with seasonal variation 5/3/2018      Social History:  Social History    Tobacco Use      Smoking status: Former Smoker        Packs/day: 0.30        Years: 15.00        Pack years: 4.5        Types: Cigarette Dispense: 1 kit; Refill: 0    2. Acute recurrent pansinusitis  Complicated by allergic rhinitis. Prescription for Augmentin. Prescription for Medrol Dosepak. - Amoxicillin-Pot Clavulanate 875-125 MG Oral Tab; Take 1 tablet by mouth Q12H for 10 days.

## 2020-06-18 ENCOUNTER — PATIENT MESSAGE (OUTPATIENT)
Dept: FAMILY MEDICINE CLINIC | Facility: CLINIC | Age: 40
End: 2020-06-18

## 2020-06-18 NOTE — TELEPHONE ENCOUNTER
From: Miller Castaneda  To: Nilsa Rice DO  Sent: 6/18/2020 2:33 PM CDT  Subject: Other    I just wanted to send a message and let you know I am doing well. My shingles are almost gone and so far no further headaches.  I forgot to make the follow up

## 2020-07-15 ENCOUNTER — HOSPITAL ENCOUNTER (OUTPATIENT)
Dept: MAMMOGRAPHY | Age: 40
Discharge: HOME | End: 2020-07-15
Attending: FAMILY MEDICINE
Payer: COMMERCIAL

## 2020-07-15 DIAGNOSIS — Z12.31 ENCOUNTER FOR SCREENING MAMMOGRAM FOR MALIGNANT NEOPLASM OF BREAST: ICD-10-CM

## 2020-07-15 PROCEDURE — 77067 SCR MAMMO BI INCL CAD: CPT | Performed by: FAMILY MEDICINE

## 2020-07-15 PROCEDURE — 77063 BREAST TOMOSYNTHESIS BI: CPT | Performed by: FAMILY MEDICINE

## 2020-07-30 ENCOUNTER — HOSPITAL ENCOUNTER (OUTPATIENT)
Dept: MAMMOGRAPHY | Age: 40
Discharge: HOME | End: 2020-07-30
Attending: FAMILY MEDICINE
Payer: COMMERCIAL

## 2020-07-30 ENCOUNTER — HOSPITAL ENCOUNTER (OUTPATIENT)
Dept: ULTRASOUND IMAGING | Age: 40
Discharge: HOME | End: 2020-07-30
Attending: FAMILY MEDICINE
Payer: COMMERCIAL

## 2020-07-30 DIAGNOSIS — R92.2 INCONCLUSIVE MAMMOGRAM: ICD-10-CM

## 2020-07-30 PROCEDURE — 77061 BREAST TOMOSYNTHESIS UNI: CPT | Performed by: FAMILY MEDICINE

## 2020-07-30 PROCEDURE — 76642 ULTRASOUND BREAST LIMITED: CPT | Performed by: FAMILY MEDICINE

## 2020-07-30 PROCEDURE — 77065 DX MAMMO INCL CAD UNI: CPT | Performed by: FAMILY MEDICINE

## 2020-08-21 ENCOUNTER — VIRTUAL PHONE E/M (OUTPATIENT)
Dept: FAMILY MEDICINE CLINIC | Facility: CLINIC | Age: 40
End: 2020-08-21
Payer: COMMERCIAL

## 2020-08-21 VITALS — BODY MASS INDEX: 22.92 KG/M2 | HEIGHT: 66 IN | WEIGHT: 142.63 LBS

## 2020-08-21 DIAGNOSIS — N92.1 METRORRHAGIA: Primary | ICD-10-CM

## 2020-08-21 DIAGNOSIS — J30.89 PERENNIAL ALLERGIC RHINITIS WITH SEASONAL VARIATION: ICD-10-CM

## 2020-08-21 DIAGNOSIS — M32.9 LUPUS (HCC): ICD-10-CM

## 2020-08-21 DIAGNOSIS — J30.2 PERENNIAL ALLERGIC RHINITIS WITH SEASONAL VARIATION: ICD-10-CM

## 2020-08-21 PROBLEM — R06.2 INSPIRATORY WHEEZE ON EXAMINATION: Status: RESOLVED | Noted: 2020-01-18 | Resolved: 2020-08-21

## 2020-08-21 PROBLEM — J01.41 ACUTE RECURRENT PANSINUSITIS: Status: RESOLVED | Noted: 2020-06-03 | Resolved: 2020-08-21

## 2020-08-21 PROBLEM — R09.89 ABNORMAL LUNG SOUNDS: Status: RESOLVED | Noted: 2020-01-18 | Resolved: 2020-08-21

## 2020-08-21 PROBLEM — B02.9 HERPES ZOSTER WITHOUT COMPLICATION: Status: RESOLVED | Noted: 2020-06-03 | Resolved: 2020-08-21

## 2020-08-21 PROCEDURE — 3008F BODY MASS INDEX DOCD: CPT | Performed by: FAMILY MEDICINE

## 2020-08-21 PROCEDURE — 99214 OFFICE O/P EST MOD 30 MIN: CPT | Performed by: FAMILY MEDICINE

## 2020-08-21 NOTE — PROGRESS NOTES
Virtual/Telephone Check-In    Lázaropascual Brad verbally consents to a Virtual/Telephone Check-In service on 08/21/20. Patient has been referred to the [facility] website at [email or web address] to review the yearly Consent to Treat document.   Patient un  of her OCP. Allergies: Worsening. Patient taking Singulair daily for the most part. Occasionally also takes an OTC antihistamine. Using Flonase regularly.         Current Outpatient Medications   Medication Sig Dispense Refill   • Sert 5/3/2018      Social History:  Social History    Tobacco Use      Smoking status: Former Smoker        Packs/day: 0.30        Years: 15.00        Pack years: 4.5        Types: Cigarettes        Quit date: 2014        Years since quittin.2      Smo consideration for tubal ligation. 2. Perennial allergic rhinitis with seasonal variation  Continue Singulair daily. Continue Flonase.   Recommend take OTC antihistamine regularly for at least a couple of weeks to see if that helps since she is having

## 2020-08-22 PROBLEM — J30.1 SEASONAL ALLERGIC RHINITIS DUE TO POLLEN: Status: RESOLVED | Noted: 2019-04-30 | Resolved: 2020-08-22

## 2020-08-22 PROBLEM — N92.1 METRORRHAGIA: Status: ACTIVE | Noted: 2020-08-22

## 2020-08-22 PROBLEM — N84.1 CERVICAL POLYP: Status: RESOLVED | Noted: 2018-04-02 | Resolved: 2020-08-22

## 2020-08-22 PROBLEM — J30.89 PERENNIAL ALLERGIC RHINITIS: Status: RESOLVED | Noted: 2019-06-15 | Resolved: 2020-08-22

## 2020-09-09 ENCOUNTER — PATIENT MESSAGE (OUTPATIENT)
Dept: FAMILY MEDICINE CLINIC | Facility: CLINIC | Age: 40
End: 2020-09-09

## 2020-09-10 ENCOUNTER — TELEPHONE (OUTPATIENT)
Dept: FAMILY MEDICINE CLINIC | Facility: CLINIC | Age: 40
End: 2020-09-10

## 2020-09-10 ENCOUNTER — VIRTUAL PHONE E/M (OUTPATIENT)
Dept: FAMILY MEDICINE CLINIC | Facility: CLINIC | Age: 40
End: 2020-09-10
Payer: COMMERCIAL

## 2020-09-10 DIAGNOSIS — G43.109 MIGRAINE WITH AURA AND WITHOUT STATUS MIGRAINOSUS, NOT INTRACTABLE: Primary | ICD-10-CM

## 2020-09-10 PROCEDURE — 99214 OFFICE O/P EST MOD 30 MIN: CPT | Performed by: FAMILY MEDICINE

## 2020-09-10 RX ORDER — METHYLPREDNISOLONE 4 MG/1
TABLET ORAL
Qty: 1 PACKAGE | Refills: 0 | Status: SHIPPED | OUTPATIENT
Start: 2020-09-10 | End: 2020-09-23 | Stop reason: ALTCHOICE

## 2020-09-10 RX ORDER — IBUPROFEN 600 MG/1
600 TABLET ORAL EVERY 8 HOURS PRN
Qty: 30 TABLET | Refills: 0 | Status: ON HOLD | OUTPATIENT
Start: 2020-09-10 | End: 2020-12-09

## 2020-09-10 RX ORDER — TIZANIDINE 4 MG/1
4 TABLET ORAL NIGHTLY
Qty: 10 TABLET | Refills: 0 | Status: SHIPPED | OUTPATIENT
Start: 2020-09-10 | End: 2020-09-23 | Stop reason: ALTCHOICE

## 2020-09-10 NOTE — TELEPHONE ENCOUNTER
From: Case Degree  To: Joseph Quinonez DO  Sent: 9/9/2020 6:58 PM CDT  Subject: Non-Urgent Medical Question    I am writing to let you know I'm starting to get really bad sinus migraines again. I've had this one for 2 days.  I'm not sure what the ne

## 2020-09-10 NOTE — PROGRESS NOTES
Virtual/Telephone Check-In    Cintia Doyle verbally consents to a Virtual/Telephone Check-In service on 09/10/20. Patient has been referred to the [facility] website at [email or web address] to review the yearly Consent to Treat document.   Patient un mouth every 8 (eight) hours as needed for Pain. 30 tablet 0   • Sertraline HCl 100 MG Oral Tab Take 100 mg by mouth daily.      • traZODone HCl 50 MG Oral Tab TAKE 1 TO 2 TABLETS BY MOUTH ONCE A DAY AT BEDTIME     • Montelukast Sodium 10 MG Oral Tab Take 1 use: No      Alcohol/week: 0.0 standard drinks    Drug use: No       REVIEW OF SYSTEMS:   GENERAL: Overall feels \"okay\"  SKIN: No new rashes.   EYES: Denies changes in vision  HEENT: As in HPI  LUNGS: Denies cough or shortness of breath  CARDIOVASCULAR: D by mouth every 8 (eight) hours as needed for Pain. Return in about 3 months (around 12/10/2020) for Annual wellness visit. Sooner if needed for migraine or other. Oren Travis

## 2020-09-10 NOTE — TELEPHONE ENCOUNTER
Chelle Ny, DO 7 minutes ago (8:07 AM)        Thank you so much! Even if I could get migraine medication I'd be ok with that. And no, not exposed to corona at all and [place]'t left [place].           You  Luis Armando SMITH 13 minutes ago (8:0

## 2020-09-10 NOTE — TELEPHONE ENCOUNTER
Dr. Bimal Guzman had a cancellation today and patient was added on to her schedule for a virtual appt. Patient agreed with plan.    Virtual appt 9/10/20 @ 2:30

## 2020-09-23 ENCOUNTER — OFFICE VISIT (OUTPATIENT)
Dept: FAMILY MEDICINE CLINIC | Facility: CLINIC | Age: 40
End: 2020-09-23
Payer: COMMERCIAL

## 2020-09-23 VITALS
DIASTOLIC BLOOD PRESSURE: 60 MMHG | SYSTOLIC BLOOD PRESSURE: 100 MMHG | HEIGHT: 67 IN | WEIGHT: 143.19 LBS | HEART RATE: 90 BPM | OXYGEN SATURATION: 97 % | BODY MASS INDEX: 22.47 KG/M2 | TEMPERATURE: 99 F

## 2020-09-23 DIAGNOSIS — Z76.89 ENCOUNTER FOR NEW MEDICATION PRESCRIPTION: ICD-10-CM

## 2020-09-23 DIAGNOSIS — G43.109 MIGRAINE WITH AURA AND WITHOUT STATUS MIGRAINOSUS, NOT INTRACTABLE: Primary | ICD-10-CM

## 2020-09-23 PROCEDURE — 99214 OFFICE O/P EST MOD 30 MIN: CPT | Performed by: FAMILY MEDICINE

## 2020-09-23 PROCEDURE — 3078F DIAST BP <80 MM HG: CPT | Performed by: FAMILY MEDICINE

## 2020-09-23 PROCEDURE — 3008F BODY MASS INDEX DOCD: CPT | Performed by: FAMILY MEDICINE

## 2020-09-23 PROCEDURE — 3074F SYST BP LT 130 MM HG: CPT | Performed by: FAMILY MEDICINE

## 2020-09-23 RX ORDER — SUMATRIPTAN 50 MG/1
TABLET, FILM COATED ORAL
Qty: 6 TABLET | Refills: 0 | Status: SHIPPED | OUTPATIENT
Start: 2020-09-23 | End: 2021-02-05

## 2020-09-23 RX ORDER — FOLIC ACID 1 MG/1
1 TABLET ORAL DAILY
COMMUNITY
Start: 2020-09-15

## 2020-09-23 RX ORDER — PREDNISONE 1 MG/1
5 TABLET ORAL
COMMUNITY
Start: 2020-09-15 | End: 2020-11-05 | Stop reason: ALTCHOICE

## 2020-09-23 NOTE — PROGRESS NOTES
Chuckie Aleman is a 36year old female. HPI:       Headaches:  Vision changes when HA is severe, blurry vision, dark spots, light sensitivity. HA also associated with nausea, close to vomiting. Pain up to 10 out of 10.   Patient frequently starts Propionate 50 MCG/ACT Nasal Suspension 2 sprays by Each Nare route daily. 1 Bottle 3   • Ipratropium Bromide 0.06 % Nasal Solution 2 sprays by Nasal route 3 (three) times daily. (Patient taking differently: 2 sprays by Nasal route 3 (three) times daily.  As Date(s) Administered    TDAP                  04/02/2018    Deferred                Date(s) Deferred    Influenza Vaccine Refused                          09/23/2020      EXAM:   /60 (BP Location: Left arm, Patient Position: Sitting, Cuff Size: adult documents the process)      Meds & Refills for this Visit:  Requested Prescriptions     Signed Prescriptions Disp Refills   • SUMAtriptan Succinate 50 MG Oral Tab 6 tablet 0     Sig: Take at first sign/symptom of migraine headache.        Imaging & Consults

## 2020-09-23 NOTE — PATIENT INSTRUCTIONS
-If you end up using the sumatriptan and to get rid of the headache, call to let Dr. Glenna Rudolph know if it worked and if you had any side effects.     -If you do not hear from the repeat referral department in 3-5 business days regarding author migraines. Some treatment options include biofeedback and acupuncture. Ask your healthcare provider to tell you more about these treatments if you have questions. · Limit caffeine. You may find that caffeine helps relieve pain during an attack.  But too mu bright or flashing lights can be triggers. · Medicine overuse. Frequent use of pain medicines for headache pain can also cause a headache. This may also be called rebound headache.   Control your triggers  These are some of the things you can do to try to

## 2020-09-24 ENCOUNTER — TELEPHONE (OUTPATIENT)
Dept: FAMILY MEDICINE CLINIC | Facility: CLINIC | Age: 40
End: 2020-09-24

## 2020-09-24 NOTE — TELEPHONE ENCOUNTER
[facility] pharmacy requesting to know what is the max dose per 24 hours for Sumatriptan for day calculation per insurance request.     SUMAtriptan Succinate 50 MG Oral Tab 6 tablet 0 9/23/2020    Sig:   Take at first sign/symptom of migraine headache.

## 2020-09-30 ENCOUNTER — HOSPITAL ENCOUNTER (OUTPATIENT)
Dept: MRI IMAGING | Age: 40
Discharge: HOME | End: 2020-09-30
Attending: FAMILY MEDICINE
Payer: COMMERCIAL

## 2020-09-30 DIAGNOSIS — G43.109 MIGRAINE WITH AURA AND WITHOUT STATUS MIGRAINOSUS, NOT INTRACTABLE: ICD-10-CM

## 2020-09-30 PROCEDURE — 70553 MRI BRAIN STEM W/O & W/DYE: CPT | Performed by: FAMILY MEDICINE

## 2020-09-30 PROCEDURE — A9575 INJ GADOTERATE MEGLUMI 0.1ML: HCPCS | Performed by: FAMILY MEDICINE

## 2020-10-04 ENCOUNTER — PATIENT MESSAGE (OUTPATIENT)
Dept: FAMILY MEDICINE CLINIC | Facility: CLINIC | Age: 40
End: 2020-10-04

## 2020-10-05 ENCOUNTER — PATIENT MESSAGE (OUTPATIENT)
Dept: FAMILY MEDICINE CLINIC | Facility: CLINIC | Age: 40
End: 2020-10-05

## 2020-10-05 NOTE — TELEPHONE ENCOUNTER
From: Oneal Carlson  To: Demarcus Carrasco DO  Sent: 10/5/2020 8:47 AM CDT  Subject: Test Results Question    The soonest available was 10/19. I was wondering if I should try to schedule with an ENT if she thinks these migraines are due to sinuses.

## 2020-10-05 NOTE — TELEPHONE ENCOUNTER
From: Rudy Sarmiento  To: Kaleigh Bustos DO  Sent: 10/4/2020 4:36 PM CDT  Subject: Test Results Question    Can I make a video visit to discuss the MRI? I'm just trying to figure out what to do next so these headaches stop. Thanks!   Salud Gomez

## 2020-10-08 ENCOUNTER — OFFICE VISIT (OUTPATIENT)
Dept: OBGYN CLINIC | Facility: CLINIC | Age: 40
End: 2020-10-08
Payer: COMMERCIAL

## 2020-10-08 ENCOUNTER — VIRTUAL PHONE E/M (OUTPATIENT)
Dept: FAMILY MEDICINE CLINIC | Facility: CLINIC | Age: 40
End: 2020-10-08
Payer: COMMERCIAL

## 2020-10-08 ENCOUNTER — TELEPHONE (OUTPATIENT)
Dept: FAMILY MEDICINE CLINIC | Facility: CLINIC | Age: 40
End: 2020-10-08

## 2020-10-08 VITALS
TEMPERATURE: 98 F | WEIGHT: 144 LBS | HEIGHT: 66 IN | SYSTOLIC BLOOD PRESSURE: 118 MMHG | BODY MASS INDEX: 23.14 KG/M2 | DIASTOLIC BLOOD PRESSURE: 78 MMHG

## 2020-10-08 DIAGNOSIS — J34.9 PARANASAL SINUS DISEASE: ICD-10-CM

## 2020-10-08 DIAGNOSIS — Z76.89 ENCOUNTER FOR NEW MEDICATION PRESCRIPTION: ICD-10-CM

## 2020-10-08 DIAGNOSIS — N92.6 IRREGULAR MENSES: ICD-10-CM

## 2020-10-08 DIAGNOSIS — Z30.2 REQUEST FOR STERILIZATION: Primary | ICD-10-CM

## 2020-10-08 DIAGNOSIS — N93.9 ABNORMAL UTERINE BLEEDING (AUB): ICD-10-CM

## 2020-10-08 DIAGNOSIS — J34.1 PARANASAL SINUS MUCOCELE: ICD-10-CM

## 2020-10-08 DIAGNOSIS — J30.2 PERENNIAL ALLERGIC RHINITIS WITH SEASONAL VARIATION: ICD-10-CM

## 2020-10-08 DIAGNOSIS — N95.1 HOT FLUSHES, PERIMENOPAUSAL: ICD-10-CM

## 2020-10-08 DIAGNOSIS — G43.109 MIGRAINE WITH AURA AND WITHOUT STATUS MIGRAINOSUS, NOT INTRACTABLE: ICD-10-CM

## 2020-10-08 DIAGNOSIS — J30.89 PERENNIAL ALLERGIC RHINITIS WITH SEASONAL VARIATION: ICD-10-CM

## 2020-10-08 DIAGNOSIS — G43.109 MIGRAINE WITH AURA AND WITHOUT STATUS MIGRAINOSUS, NOT INTRACTABLE: Primary | ICD-10-CM

## 2020-10-08 PROCEDURE — 85025 COMPLETE CBC W/AUTO DIFF WBC: CPT | Performed by: OBSTETRICS & GYNECOLOGY

## 2020-10-08 PROCEDURE — 3008F BODY MASS INDEX DOCD: CPT | Performed by: OBSTETRICS & GYNECOLOGY

## 2020-10-08 PROCEDURE — 3078F DIAST BP <80 MM HG: CPT | Performed by: OBSTETRICS & GYNECOLOGY

## 2020-10-08 PROCEDURE — 99214 OFFICE O/P EST MOD 30 MIN: CPT | Performed by: FAMILY MEDICINE

## 2020-10-08 PROCEDURE — 3074F SYST BP LT 130 MM HG: CPT | Performed by: OBSTETRICS & GYNECOLOGY

## 2020-10-08 PROCEDURE — 84443 ASSAY THYROID STIM HORMONE: CPT | Performed by: OBSTETRICS & GYNECOLOGY

## 2020-10-08 PROCEDURE — 99215 OFFICE O/P EST HI 40 MIN: CPT | Performed by: OBSTETRICS & GYNECOLOGY

## 2020-10-08 RX ORDER — TOPIRAMATE 25 MG/1
TABLET ORAL
Qty: 72 TABLET | Refills: 0 | Status: SHIPPED | OUTPATIENT
Start: 2020-10-08 | End: 2020-10-08

## 2020-10-08 RX ORDER — TOPIRAMATE 25 MG/1
TABLET ORAL
Qty: 72 TABLET | Refills: 0 | Status: SHIPPED | OUTPATIENT
Start: 2020-10-08 | End: 2020-10-28 | Stop reason: DRUGHIGH

## 2020-10-08 NOTE — PROGRESS NOTES
[facility]  Obstetrics and Gynecology  Follow Up Progress Note    Subjective:     Oz Abdalla is a 36year old  female who was last seen in office 2018 and presents with c/o request for permanent sterilization and AUB w/ irregula Meds:    •  methotrexate 2.5 MG Oral Tab, Take 10 mg by mouth every 7 days. , Disp: , Rfl:     •  folic acid 1 MG Oral Tab, Take 1 mg by mouth daily. , Disp: , Rfl:     •  predniSONE 5 MG Oral Tab, 5 mg., Disp: , Rfl:     •  SUMAtriptan Succinate 50 MG O Perennial allergic rhinitis with seasonal variation 5/3/2018       PSH:  Past Surgical History:   Procedure Laterality Date   • COLPOSCOPY, CERVIX INC UPPER/ADJACENT VAGINA     • COLPOSCOPY,LOOP ELECTRD CERVIX EXCIS     • CONIZATION CERVIX,LOOP ELECTRD bilateral salpingectomy, possibly exploratory laparotomy   - d/w risks, benefits and alternatives including but not limited to risk of infection, bleeding, injury, anestheisia and exploratory laparotomy   - discussion included the permanent nature of the p

## 2020-10-08 NOTE — TELEPHONE ENCOUNTER
topiramate 25 MG Oral Tab 72 tablet 0 10/8/2020    Sig:   Start 1 tab po qhs for 1 wk, then twice a day for 1 week, then one tab in the morning and 2 tabs qhs for week, then 2 tabs twice a day.         Per pharmacy: missing information  Drug is 1 tablets in

## 2020-10-08 NOTE — PROGRESS NOTES
Virtual/Telephone Check-In    Lisy Schwartz verbally consents to a Virtual/Telephone Check-In service on 10/08/20. Patient has been referred to the [facility] website at [email or web address] to review the yearly Consent to Treat document.   Patient un Tab Take 0.25 mg by mouth daily as needed. • Albuterol Sulfate HFA (PROAIR HFA) 108 (90 Base) MCG/ACT Inhalation Aero Soln Inhale 2 puffs into the lungs every 6 (six) hours as needed for Wheezing.      • Fluticasone Propionate 50 MCG/ACT Nasal Suspensio 15.00        Pack years: 4.5        Types: Cigarettes        Quit date: 2014        Years since quittin.3      Smokeless tobacco: Never Used    Alcohol use: No      Alcohol/week: 0.0 standard drinks    Drug use: No       REVIEW OF SYSTEMS:   GENER restricted diffusion to suggest acute ischemia/infarction. 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 Stable 6developmental venous anomaly in the left corona radiata.      Small right maxillary retention cyst.  Trace scattered mucosal thickening in the pa then one tab in the morning and 2 tabs qhs for week, then 2 tabs twice a day. Dispense: 72 tablet;  Refill: 0        Meds & Refills for this Visit:  - topiramate 25 MG Oral Tab; Start 1 tab po qhs for 1 wk, then twice a day for 1 week, then one tab in the

## 2020-10-08 NOTE — PATIENT INSTRUCTIONS
Please return if having abnormal vaginal bleeding or severe pelvic pain    You will be scheduled for laparoscopic bilateral salpingectomy (removal of both fallopian tubes via smaller abdominal incisions) and possible exploratory laparotomy (possible larger

## 2020-10-14 ENCOUNTER — TELEPHONE (OUTPATIENT)
Dept: OBGYN CLINIC | Facility: CLINIC | Age: 40
End: 2020-10-14

## 2020-10-14 DIAGNOSIS — Z30.2 REQUEST FOR STERILIZATION: Primary | ICD-10-CM

## 2020-10-14 NOTE — TELEPHONE ENCOUNTER
Surgery scheduled for 12/9/2020 7:30AM  Post op   Future Appointments   Date Time Provider Rob Cassidy   11/5/2020  3:30 PM Yris Tijerina DO [facility] [place]   12/28/2020 11:15 AM Deepak Chaudhry MD [facility] [address]     Orders entere

## 2020-10-14 NOTE — TELEPHONE ENCOUNTER
----- Message from Marlon Fothergill, MD sent at 10/9/2020  3:11 PM CDT -----  Regarding: please schedule surgery  Surgeon: Dr. Marlon Fothergill   Assistant: Yes (may be surgical assist)     Type of Admit: Hospital outpatient, does not require admission followi

## 2020-10-28 DIAGNOSIS — G43.109 MIGRAINE WITH AURA AND WITHOUT STATUS MIGRAINOSUS, NOT INTRACTABLE: Primary | ICD-10-CM

## 2020-10-28 DIAGNOSIS — Z76.89 ENCOUNTER FOR NEW MEDICATION PRESCRIPTION: ICD-10-CM

## 2020-10-28 RX ORDER — TOPIRAMATE 50 MG/1
50 TABLET, FILM COATED ORAL 2 TIMES DAILY
Qty: 60 TABLET | Refills: 1 | Status: SHIPPED | OUTPATIENT
Start: 2020-10-28 | End: 2020-11-05

## 2020-10-28 NOTE — TELEPHONE ENCOUNTER
topiramate 25 MG Oral Tab 72 tablet 0 10/8/2020    Sig:   Start 1 tab po qhs for 1 wk, then twice a day for 1 week, then one tab in the morning and 2 tabs qhs for 1 wk, then 2 tabs twice a day. Route:   (none)       Patient should be on 50mg BID.    Upd

## 2020-11-05 ENCOUNTER — OFFICE VISIT (OUTPATIENT)
Dept: FAMILY MEDICINE CLINIC | Facility: CLINIC | Age: 40
End: 2020-11-05
Payer: COMMERCIAL

## 2020-11-05 VITALS
WEIGHT: 144 LBS | HEART RATE: 78 BPM | HEIGHT: 66 IN | DIASTOLIC BLOOD PRESSURE: 68 MMHG | OXYGEN SATURATION: 98 % | TEMPERATURE: 98 F | BODY MASS INDEX: 23.14 KG/M2 | SYSTOLIC BLOOD PRESSURE: 118 MMHG

## 2020-11-05 DIAGNOSIS — G43.109 MIGRAINE WITH AURA AND WITHOUT STATUS MIGRAINOSUS, NOT INTRACTABLE: Primary | ICD-10-CM

## 2020-11-05 DIAGNOSIS — Z23 FLU VACCINE NEED: ICD-10-CM

## 2020-11-05 PROCEDURE — 99214 OFFICE O/P EST MOD 30 MIN: CPT | Performed by: FAMILY MEDICINE

## 2020-11-05 PROCEDURE — 3074F SYST BP LT 130 MM HG: CPT | Performed by: FAMILY MEDICINE

## 2020-11-05 PROCEDURE — 3008F BODY MASS INDEX DOCD: CPT | Performed by: FAMILY MEDICINE

## 2020-11-05 PROCEDURE — 90471 IMMUNIZATION ADMIN: CPT | Performed by: FAMILY MEDICINE

## 2020-11-05 PROCEDURE — 90686 IIV4 VACC NO PRSV 0.5 ML IM: CPT | Performed by: FAMILY MEDICINE

## 2020-11-05 PROCEDURE — 3078F DIAST BP <80 MM HG: CPT | Performed by: FAMILY MEDICINE

## 2020-11-05 RX ORDER — TOPIRAMATE 50 MG/1
50 TABLET, FILM COATED ORAL 2 TIMES DAILY
Qty: 180 TABLET | Refills: 1 | Status: SHIPPED | OUTPATIENT
Start: 2020-11-05 | End: 2021-02-05

## 2020-11-05 NOTE — PROGRESS NOTES
Anton Oliver is a 36year old female. HPI:       Migraines:  First f/u on topiramate and the sumatriptan. Used Sumatriptan once since starting the topiramate. Sumatriptan with good relief. Had 1 migraine HA since starting the topiramate.   Has Hydroxychloroquine Sulfate (PLAQUENIL) 200 MG Oral Tab Take 2 tablets by mouth Every morning. • gabapentin (NEURONTIN) 300 MG Oral Cap Take 2 capsules by mouth nightly.           Past Medical History:   Diagnosis Date   • Bell's palsy 8/15/2013   • Cerv 97.6 °F (36.4 °C)   Ht 66\"   Wt 144 lb (65.3 kg)   LMP 11/05/2020   SpO2 98%   BMI 23.24 kg/m²   GENERAL: NAD, pleasant well-appearing  female  SKIN: no visible rashes  HEAD: NCAT  NECK: supple, no adenopathy, no thyromegaly, no masses  LUNGS: CT Dictated by (CST): Maida Mtz MD on 10/01/2020 at 7:46 AM       Finalized by (CST): Maida Mtz MD on 10/01/2020 at 7:48 AM              ASSESSMENT AND PLAN:       Migraine with aura and without status migrainosus, not intractable  (primary e

## 2020-12-01 RX ORDER — ACETAMINOPHEN 500 MG
1000 TABLET ORAL ONCE
Status: CANCELLED | OUTPATIENT
Start: 2020-12-01 | End: 2020-12-01

## 2020-12-07 ENCOUNTER — LAB ENCOUNTER (OUTPATIENT)
Dept: LAB | Age: 40
End: 2020-12-07
Attending: OBSTETRICS & GYNECOLOGY
Payer: COMMERCIAL

## 2020-12-07 DIAGNOSIS — Z30.2 REQUEST FOR STERILIZATION: ICD-10-CM

## 2020-12-07 PROCEDURE — 85027 COMPLETE CBC AUTOMATED: CPT

## 2020-12-07 PROCEDURE — 36415 COLL VENOUS BLD VENIPUNCTURE: CPT

## 2020-12-09 ENCOUNTER — ANESTHESIA EVENT (OUTPATIENT)
Dept: SURGERY | Facility: HOSPITAL | Age: 40
End: 2020-12-09
Payer: COMMERCIAL

## 2020-12-09 ENCOUNTER — HOSPITAL ENCOUNTER (OUTPATIENT)
Facility: HOSPITAL | Age: 40
Setting detail: HOSPITAL OUTPATIENT SURGERY
Discharge: HOME | End: 2020-12-09
Attending: OBSTETRICS & GYNECOLOGY | Admitting: OBSTETRICS & GYNECOLOGY
Payer: COMMERCIAL

## 2020-12-09 ENCOUNTER — ANESTHESIA (OUTPATIENT)
Dept: SURGERY | Facility: HOSPITAL | Age: 40
End: 2020-12-09
Payer: COMMERCIAL

## 2020-12-09 VITALS
BODY MASS INDEX: 22.46 KG/M2 | RESPIRATION RATE: 16 BRPM | DIASTOLIC BLOOD PRESSURE: 52 MMHG | SYSTOLIC BLOOD PRESSURE: 119 MMHG | OXYGEN SATURATION: 98 % | HEART RATE: 94 BPM | HEIGHT: 66 IN | TEMPERATURE: 99 F | WEIGHT: 139.75 LBS

## 2020-12-09 DIAGNOSIS — Z30.2 REQUEST FOR STERILIZATION: Primary | ICD-10-CM

## 2020-12-09 PROCEDURE — 58661 LAPAROSCOPY REMOVE ADNEXA: CPT | Performed by: OBSTETRICS & GYNECOLOGY

## 2020-12-09 PROCEDURE — 0UT74ZZ RESECTION OF BILATERAL FALLOPIAN TUBES, PERCUTANEOUS ENDOSCOPIC APPROACH: ICD-10-PCS | Performed by: OBSTETRICS & GYNECOLOGY

## 2020-12-09 RX ORDER — ACETAMINOPHEN 500 MG
1000 TABLET ORAL ONCE
COMMUNITY
End: 2020-12-28 | Stop reason: ALTCHOICE

## 2020-12-09 RX ORDER — HYDROMORPHONE HYDROCHLORIDE 1 MG/ML
0.4 INJECTION, SOLUTION INTRAMUSCULAR; INTRAVENOUS; SUBCUTANEOUS EVERY 5 MIN PRN
Status: DISCONTINUED | OUTPATIENT
Start: 2020-12-09 | End: 2020-12-09

## 2020-12-09 RX ORDER — DEXAMETHASONE SODIUM PHOSPHATE 4 MG/ML
VIAL (ML) INJECTION AS NEEDED
Status: DISCONTINUED | OUTPATIENT
Start: 2020-12-09 | End: 2020-12-09 | Stop reason: SURG

## 2020-12-09 RX ORDER — HYDROCODONE BITARTRATE AND ACETAMINOPHEN 5; 325 MG/1; MG/1
2 TABLET ORAL AS NEEDED
Status: DISCONTINUED | OUTPATIENT
Start: 2020-12-09 | End: 2020-12-09

## 2020-12-09 RX ORDER — HYDROCODONE BITARTRATE AND ACETAMINOPHEN 5; 325 MG/1; MG/1
1 TABLET ORAL AS NEEDED
Status: COMPLETED | OUTPATIENT
Start: 2020-12-09 | End: 2020-12-09

## 2020-12-09 RX ORDER — KETOROLAC TROMETHAMINE 30 MG/ML
INJECTION, SOLUTION INTRAMUSCULAR; INTRAVENOUS AS NEEDED
Status: DISCONTINUED | OUTPATIENT
Start: 2020-12-09 | End: 2020-12-09 | Stop reason: SURG

## 2020-12-09 RX ORDER — NEOSTIGMINE METHYLSULFATE 1 MG/ML
INJECTION INTRAVENOUS AS NEEDED
Status: DISCONTINUED | OUTPATIENT
Start: 2020-12-09 | End: 2020-12-09 | Stop reason: SURG

## 2020-12-09 RX ORDER — IBUPROFEN 600 MG/1
600 TABLET ORAL ONCE AS NEEDED
Status: DISCONTINUED | OUTPATIENT
Start: 2020-12-09 | End: 2020-12-09

## 2020-12-09 RX ORDER — ONDANSETRON 2 MG/ML
4 INJECTION INTRAMUSCULAR; INTRAVENOUS AS NEEDED
Status: DISCONTINUED | OUTPATIENT
Start: 2020-12-09 | End: 2020-12-09

## 2020-12-09 RX ORDER — HYDROCODONE BITARTRATE AND ACETAMINOPHEN 5; 325 MG/1; MG/1
2 TABLET ORAL AS NEEDED
Status: COMPLETED | OUTPATIENT
Start: 2020-12-09 | End: 2020-12-09

## 2020-12-09 RX ORDER — ONDANSETRON 2 MG/ML
INJECTION INTRAMUSCULAR; INTRAVENOUS AS NEEDED
Status: DISCONTINUED | OUTPATIENT
Start: 2020-12-09 | End: 2020-12-09 | Stop reason: SURG

## 2020-12-09 RX ORDER — CEFAZOLIN SODIUM/WATER 2 G/20 ML
SYRINGE (ML) INTRAVENOUS
Status: DISCONTINUED
Start: 2020-12-09 | End: 2020-12-09

## 2020-12-09 RX ORDER — BUPIVACAINE HYDROCHLORIDE AND EPINEPHRINE 5; 5 MG/ML; UG/ML
INJECTION, SOLUTION EPIDURAL; INTRACAUDAL; PERINEURAL AS NEEDED
Status: DISCONTINUED | OUTPATIENT
Start: 2020-12-09 | End: 2020-12-09 | Stop reason: HOSPADM

## 2020-12-09 RX ORDER — IBUPROFEN 600 MG/1
600 TABLET ORAL EVERY 6 HOURS PRN
Qty: 30 TABLET | Refills: 0 | Status: SHIPPED | OUTPATIENT
Start: 2020-12-09 | End: 2021-05-18

## 2020-12-09 RX ORDER — LIDOCAINE HYDROCHLORIDE 10 MG/ML
INJECTION, SOLUTION EPIDURAL; INFILTRATION; INTRACAUDAL; PERINEURAL AS NEEDED
Status: DISCONTINUED | OUTPATIENT
Start: 2020-12-09 | End: 2020-12-09 | Stop reason: SURG

## 2020-12-09 RX ORDER — HYDROCODONE BITARTRATE AND ACETAMINOPHEN 5; 325 MG/1; MG/1
1 TABLET ORAL AS NEEDED
Status: DISCONTINUED | OUTPATIENT
Start: 2020-12-09 | End: 2020-12-09

## 2020-12-09 RX ORDER — SODIUM CHLORIDE, SODIUM LACTATE, POTASSIUM CHLORIDE, CALCIUM CHLORIDE 600; 310; 30; 20 MG/100ML; MG/100ML; MG/100ML; MG/100ML
INJECTION, SOLUTION INTRAVENOUS CONTINUOUS
Status: DISCONTINUED | OUTPATIENT
Start: 2020-12-09 | End: 2020-12-09

## 2020-12-09 RX ORDER — METOCLOPRAMIDE HYDROCHLORIDE 5 MG/ML
10 INJECTION INTRAMUSCULAR; INTRAVENOUS AS NEEDED
Status: DISCONTINUED | OUTPATIENT
Start: 2020-12-09 | End: 2020-12-09

## 2020-12-09 RX ORDER — GLYCOPYRROLATE 0.2 MG/ML
INJECTION, SOLUTION INTRAMUSCULAR; INTRAVENOUS AS NEEDED
Status: DISCONTINUED | OUTPATIENT
Start: 2020-12-09 | End: 2020-12-09 | Stop reason: SURG

## 2020-12-09 RX ORDER — CEFAZOLIN SODIUM/WATER 2 G/20 ML
SYRINGE (ML) INTRAVENOUS AS NEEDED
Status: DISCONTINUED | OUTPATIENT
Start: 2020-12-09 | End: 2020-12-09 | Stop reason: SURG

## 2020-12-09 RX ORDER — DEXAMETHASONE SODIUM PHOSPHATE 4 MG/ML
4 VIAL (ML) INJECTION AS NEEDED
Status: DISCONTINUED | OUTPATIENT
Start: 2020-12-09 | End: 2020-12-09

## 2020-12-09 RX ORDER — ROCURONIUM BROMIDE 10 MG/ML
INJECTION, SOLUTION INTRAVENOUS AS NEEDED
Status: DISCONTINUED | OUTPATIENT
Start: 2020-12-09 | End: 2020-12-09 | Stop reason: SURG

## 2020-12-09 RX ORDER — MIDAZOLAM HYDROCHLORIDE 1 MG/ML
INJECTION INTRAMUSCULAR; INTRAVENOUS AS NEEDED
Status: DISCONTINUED | OUTPATIENT
Start: 2020-12-09 | End: 2020-12-09 | Stop reason: SURG

## 2020-12-09 RX ORDER — NALOXONE HYDROCHLORIDE 0.4 MG/ML
80 INJECTION, SOLUTION INTRAMUSCULAR; INTRAVENOUS; SUBCUTANEOUS AS NEEDED
Status: DISCONTINUED | OUTPATIENT
Start: 2020-12-09 | End: 2020-12-09

## 2020-12-09 RX ADMIN — LIDOCAINE HYDROCHLORIDE 50 MG: 10 INJECTION, SOLUTION EPIDURAL; INFILTRATION; INTRACAUDAL; PERINEURAL at 07:34:00

## 2020-12-09 RX ADMIN — ROCURONIUM BROMIDE 40 MG: 10 INJECTION, SOLUTION INTRAVENOUS at 07:34:00

## 2020-12-09 RX ADMIN — NEOSTIGMINE METHYLSULFATE 3 MG: 1 INJECTION INTRAVENOUS at 08:21:00

## 2020-12-09 RX ADMIN — SODIUM CHLORIDE, SODIUM LACTATE, POTASSIUM CHLORIDE, CALCIUM CHLORIDE: 600; 310; 30; 20 INJECTION, SOLUTION INTRAVENOUS at 08:35:00

## 2020-12-09 RX ADMIN — KETOROLAC TROMETHAMINE 30 MG: 30 INJECTION, SOLUTION INTRAMUSCULAR; INTRAVENOUS at 08:19:00

## 2020-12-09 RX ADMIN — DEXAMETHASONE SODIUM PHOSPHATE 8 MG: 4 MG/ML VIAL (ML) INJECTION at 07:44:00

## 2020-12-09 RX ADMIN — CEFAZOLIN SODIUM/WATER 2 G: 2 G/20 ML SYRINGE (ML) INTRAVENOUS at 07:44:00

## 2020-12-09 RX ADMIN — MIDAZOLAM HYDROCHLORIDE 2 MG: 1 INJECTION INTRAMUSCULAR; INTRAVENOUS at 07:29:00

## 2020-12-09 RX ADMIN — ONDANSETRON 4 MG: 2 INJECTION INTRAMUSCULAR; INTRAVENOUS at 08:19:00

## 2020-12-09 RX ADMIN — GLYCOPYRROLATE 0.6 MG: 0.2 INJECTION, SOLUTION INTRAMUSCULAR; INTRAVENOUS at 08:21:00

## 2020-12-09 NOTE — ANESTHESIA PREPROCEDURE EVALUATION
PRE-OP EVALUATION    Patient Name: Rakesh Ko    Pre-op Diagnosis: Request for sterilization [Z30.2]    Procedure(s):  Laparoscopic assisted bilateral salpingectomy, possible exploratory laparotomy    Surgeon(s) and Role:     Dom Hall MD month at Unknown time    •  ALPRAZolam 0.25 MG Oral Tab, Take 0.25 mg by mouth daily as needed. , Disp: , Rfl: , More than a month at Unknown time    •  Albuterol Sulfate HFA (PROAIR HFA) 108 (90 Base) MCG/ACT Inhalation Aero Soln, Inhale 2 puffs into the l RDW 13.4 12/07/2020    .0 12/07/2020               Airway      Mallampati: II       Cardiovascular    Cardiovascular exam normal.         Dental    No notable dental history.          Pulmonary    Pulmonary exam normal.                 Other findi

## 2020-12-09 NOTE — ANESTHESIA PROCEDURE NOTES
Airway  Date/Time: 12/9/2020 7:37 AM  Urgency: elective      General Information and Staff    Patient location during procedure: OR  Anesthesiologist: Bia Brown MD  Resident/CRNA: Millie Bryson CRNA  Performed: CRNA     Indications and Patient Condition

## 2020-12-09 NOTE — OPERATIVE REPORT
OPERATIVE REPORT: OPERATIVE LAPAROSCOPY, LAPAROSCOPIC BILATERAL SALPINGECTOMY AND LAPAROSCOPIC ADHESIOLYSIS OF OMENTUM     Patient: Ford Villareal  MRN: [id number]  Date: 12/09/20    Pre op diagnyosis  1) Multiparous   2) Request for permanent steriliza questions answered. Consent signed and placed in the chart. The patient was taken to the operating room and general anesthesia was initiated. She was placed in dorsal lithotomy position. She was prepped and draped in normal sterile fashion.  A sterile specu wall with extension towards the left side. Therefore, adhesiolysis performed with the 5 mm blunt tip Ligasure laparoscopic vessel sealer device along the midline to optimize visualization. The left fallopian tube was then grasped and elevated.  The Ligasure

## 2020-12-09 NOTE — H&P
[facility]  Obstetrics and Gynecology  History & Physical    Alcus Shells Patient Status:  Hospital Outpatient Surgery    1980 MRN [id number]   Location [address] [place] [address] Attending Earlene Diaz MD   [facility] [place] ELECTRD CERVIX EXCIS     • CONIZATION CERVIX,LOOP ELECTRD       Family History:   Family History   Problem Relation Age of Onset   • Other (acute MI[other]) Maternal Grandfather    • Heart Disorder Maternal Grandfather    • Other (HTN[other]) Maternal [facility] [place] sign/symptom of migraine headache., Disp: 6 tablet, Rfl: 0, More than a month at Unknown time    •  ALPRAZolam 0.25 MG Oral Tab, Take 0.25 mg by mouth daily as needed. , Disp: , Rfl: , More than a month at Unknown time    •  Albuterol Sulfate HFA (PROAIR HF variation     Therapeutic drug monitoring     Encounter for long-term current use of medication     Metrorrhagia     Migraine with aura and without status migrainosus, not intractable     Paranasal sinus disease     Paranasal sinus mucocele     Request for

## 2020-12-09 NOTE — ANESTHESIA POSTPROCEDURE EVALUATION
[facility]    Franco Alexandre Patient Status:  Hospital Outpatient Surgery   Age/Gender 36year old female MRN [id number]   Location [address] [facility] Attending Deepak Chaudhry, [address] [facility] [address] Day # 0 PCP Jamin Foreman DO

## 2020-12-15 NOTE — PROGRESS NOTES
Benign pathology   Patient reports doing well. Pain well controlled and minimal. Patient reports constipation. D/w patient increased water intake, fiber, stool softner and miralax.    F/u 12/28

## 2020-12-28 ENCOUNTER — OFFICE VISIT (OUTPATIENT)
Dept: OBGYN CLINIC | Facility: CLINIC | Age: 40
End: 2020-12-28
Payer: COMMERCIAL

## 2020-12-28 VITALS
DIASTOLIC BLOOD PRESSURE: 72 MMHG | RESPIRATION RATE: 16 BRPM | HEART RATE: 68 BPM | BODY MASS INDEX: 23 KG/M2 | SYSTOLIC BLOOD PRESSURE: 110 MMHG | WEIGHT: 140 LBS

## 2020-12-28 DIAGNOSIS — Z90.79 STATUS POST BILATERAL SALPINGECTOMY: ICD-10-CM

## 2020-12-28 DIAGNOSIS — Z98.890 S/P LAPAROSCOPY: Primary | ICD-10-CM

## 2020-12-28 PROCEDURE — 3078F DIAST BP <80 MM HG: CPT | Performed by: OBSTETRICS & GYNECOLOGY

## 2020-12-28 PROCEDURE — 99024 POSTOP FOLLOW-UP VISIT: CPT | Performed by: OBSTETRICS & GYNECOLOGY

## 2020-12-28 PROCEDURE — 3074F SYST BP LT 130 MM HG: CPT | Performed by: OBSTETRICS & GYNECOLOGY

## 2020-12-28 RX ORDER — METHOTREXATE 15 MG/.3ML
20 INJECTION, SOLUTION SUBCUTANEOUS WEEKLY
COMMUNITY
Start: 2020-12-02 | End: 2021-12-07

## 2020-12-28 NOTE — PROGRESS NOTES
[facility]  Obstetrics and Gynecology  Follow Up Progress Note    Subjective:     Sravanthi Diaz is a 36year old  female who is s/p operative laparoscopy, bilateral salpingectomy and omental JOSE on 2020 2/2 multiparity and reque List:     Lupus ([facility] [address]     Fibromyalgia     General counseling for prescription of oral contraceptives     RONNIE (generalized anxiety disorder)     Chronic constipation     Perennial allergic rhinitis with seasonal variation     Therapeutic drug monitoring

## 2021-01-06 ENCOUNTER — LAB ENCOUNTER (OUTPATIENT)
Dept: LAB | Age: 41
End: 2021-01-06
Attending: FAMILY MEDICINE
Payer: COMMERCIAL

## 2021-01-06 DIAGNOSIS — Z20.822 SUSPECTED COVID-19 VIRUS INFECTION: ICD-10-CM

## 2021-01-08 ENCOUNTER — TELEMEDICINE (OUTPATIENT)
Dept: FAMILY MEDICINE CLINIC | Facility: CLINIC | Age: 41
End: 2021-01-08
Payer: COMMERCIAL

## 2021-01-08 ENCOUNTER — TELEPHONE (OUTPATIENT)
Dept: FAMILY MEDICINE CLINIC | Facility: CLINIC | Age: 41
End: 2021-01-08

## 2021-01-08 DIAGNOSIS — J01.40 ACUTE NON-RECURRENT PANSINUSITIS: ICD-10-CM

## 2021-01-08 DIAGNOSIS — R05.9 COUGH: Primary | ICD-10-CM

## 2021-01-08 DIAGNOSIS — B34.9 VIRAL SYNDROME: ICD-10-CM

## 2021-01-08 DIAGNOSIS — D84.9 IMMUNOSUPPRESSED STATUS (HCC): ICD-10-CM

## 2021-01-08 LAB — SARS-COV-2 RNA RESP QL NAA+PROBE: NOT DETECTED

## 2021-01-08 PROCEDURE — 99214 OFFICE O/P EST MOD 30 MIN: CPT | Performed by: FAMILY MEDICINE

## 2021-01-08 RX ORDER — AZITHROMYCIN 250 MG/1
TABLET, FILM COATED ORAL
Qty: 6 TABLET | Refills: 0 | Status: SHIPPED | OUTPATIENT
Start: 2021-01-08 | End: 2021-01-13

## 2021-01-08 RX ORDER — ALBUTEROL SULFATE 90 UG/1
2 AEROSOL, METERED RESPIRATORY (INHALATION) EVERY 6 HOURS PRN
Qty: 1 INHALER | Refills: 0 | Status: SHIPPED | OUTPATIENT
Start: 2021-01-08 | End: 2021-07-19

## 2021-01-08 NOTE — PATIENT INSTRUCTIONS
Coronavirus Disease 2019 (COVID-19): Overview  Coronavirus disease 2019 (COVID-19) is a respiratory illness. It's caused by a new (novel) coronavirus. There are many types of coronavirus. Coronaviruses are a very common cause of colds and bronchitis.  The · New loss of sense of smell or taste  You can check your symptoms with the CDC’s Coronavirus Self-. What are possible complications from [id number]? In many cases, this virus can cause infection (pneumonia) in both lungs.  In some cases, this can ca Your healthcare provider will ask about your symptoms. He or she will ask where you live, and about your recent travel, and any contact with sick people.  If your healthcare provider thinks you may have COVID-19, he or she will consider whether to test you · Antigen test. This can find proteins from the SARS-CoV-2 virus. This is done by a nose or a nose-throat swab. Depending on the test, some results are back within an hour.  Positive results are highly accurate, but false positives can happen, especially in The FDA has approved a vaccine to prevent COVID-19 in people 16 years and older who are not pregnant or breastfeeding. It's not currently available to the entire public.  The first phase of vaccine roll-out will go to healthcare staff and residents of long- · Remdesivir. The FDA has approved an IV (intravenous) antiviral medicine called remdesivir. It works by stopping the spread of the SARS-CoV-2 virus in the body.  It's approved for people in the hospital. It's for people 12 years and older who weigh more th You are at risk for COVID-19 if you have had close contact with someone with the virus, or if you live in or traveled to an area with cases of it. Close contact means being within 6 feet of a person known to have COVID-19 for a total of 15 minutes or more. · Wear a cloth face mask around other people. During a public health emergency, medical face masks may be reserved for healthcare workers. You may need to make a cloth face mask of your own. You can do this using a bandana, T-shirt, or other cloth.  The CDC · Wear a face mask. This is to protect other people from your germs. If you are not able to wear a mask, your caregivers should. During a public health emergency, medical face masks may be reserved for healthcare workers.  You may need to make a cloth face · Staying hydrated. Drinking liquids is the best way to prevent dehydration. Try to drink 6 to 8 glasses of liquids every day, or as advised by your provider. Also check with your provider about which fluids are best for you.  Don't drink fluids that conta · Don’t let anyone share household items with the sick person. This includes eating and drinking tools, towels, sheets, or blankets. · Clean fabrics and laundry thoroughly. · Keep other people and pets away from the sick person.     When you can stop self If you have a weak immune system and COVID-19, or if you've had severe COVID-19,  your instructions on when to stop isolation will be somewhat different. Some conditions and treatments can cause a weak immune system.  These include cancer treatment, bone ma · Confusion or trouble waking  · Fainting or loss of consciousness  · Coughing up blood  Going home from the hospital  If you were diagnosed with COVID-19 and were recently discharged from the hospital:  · Follow the instructions above for self-care and is * You shared eating or drinking utensils  * They sneezed, coughed, or somehow got respiratory droplets on you    Reducing the length of quarantine may make it easier for people to quarantine by reducing the time they cannot work.  A shorter quarantine perio 5. For medical emergencies, call 911 and notify the dispatch personnel that you have or may have COVID-19.   6. Cover your cough and sneezes.    7. Wash your hands often with soap and water for at least 20 seconds or clean your hands with an alcohol-based h · At least 10 days have passed since symptoms first appeared OR if asymptomatic patient or date of symptom onset is unclear then use 10 days post COVID test date.    · At least 20 days have passed for severe illness (requiring hospitalization) OR if you are If you would be interested in donating your plasma to help treat others diagnosed with the virus, please contact [facility] directly on their website: [email or web address]    Who is eligible to donate convalescent plasma?

## 2021-01-08 NOTE — PROGRESS NOTES
Doximity video visit with live interactive synchronous audio and video    Yolanda Ramirez verbally consents to a Doximity video visit with live interactive synchronous audio and video service on 01/08/21.   Patient has been referred to the [facility] website of Breath (Cough). 1 Inhaler 0   • RASUVO 15 MG/0.3ML Subcutaneous Solution Auto-injector Inject 15 mg as directed once a week. • ibuprofen 600 MG Oral Tab Take 1 tablet (600 mg total) by mouth every 6 (six) hours as needed for Pain.  30 tablet 0   • to giddiness    • Herpes zoster without complication 3/6/6365   • Lupus ([facility] [address]    • Migraines    • Pain in joint, shoulder region 5/8/12    right shoulder   • Perennial allergic rhinitis with seasonal variation 5/3/2018   • Visual impairment     glasses      S least 3 days. Albuterol to use as a bronchodilator and which may also help with bronchospastic cough. Prescription for azithromycin to treat for possible secondary bacterial pneumonia and sinusitis.     - Albuterol Sulfate HFA (PROAIR HFA) 108 (90 Base) M

## 2021-01-14 ENCOUNTER — PATIENT MESSAGE (OUTPATIENT)
Dept: FAMILY MEDICINE CLINIC | Facility: CLINIC | Age: 41
End: 2021-01-14

## 2021-01-14 NOTE — TELEPHONE ENCOUNTER
From: Main Angelo  To: Keyla Church DO  Sent: 1/14/2021 5:59 AM CST  Subject: Visit Follow-up Question    Dr. Carlito Brownlee wanted me to check in to let her know how I was feeling. I feel much better. I finished the Z pack yesterday.  Only issue I am s

## 2021-01-17 PROBLEM — D84.9 IMMUNOSUPPRESSED STATUS (HCC): Status: ACTIVE | Noted: 2021-01-17

## 2021-01-26 DIAGNOSIS — R05.9 COUGH: ICD-10-CM

## 2021-01-26 RX ORDER — ALBUTEROL SULFATE 90 UG/1
2 AEROSOL, METERED RESPIRATORY (INHALATION) EVERY 6 HOURS PRN
Qty: 1 INHALER | Refills: 0 | OUTPATIENT
Start: 2021-01-26

## 2021-01-26 NOTE — TELEPHONE ENCOUNTER
Albuterol Sulfate HFA (PROAIR HFA) 108 (90 Base) MCG/ACT Inhalation Aero Soln 1 Inhaler 0 1/8/2021     LOV 1/8/21.    RX was for acute cough  Rx denied

## 2021-02-05 ENCOUNTER — VIRTUAL PHONE E/M (OUTPATIENT)
Dept: FAMILY MEDICINE CLINIC | Facility: CLINIC | Age: 41
End: 2021-02-05

## 2021-02-05 DIAGNOSIS — G43.109 MIGRAINE WITH AURA AND WITHOUT STATUS MIGRAINOSUS, NOT INTRACTABLE: Primary | ICD-10-CM

## 2021-02-05 DIAGNOSIS — Z79.899 ENCOUNTER FOR LONG-TERM CURRENT USE OF MEDICATION: ICD-10-CM

## 2021-02-05 PROCEDURE — G2252 BRIEF CHKIN BY MD/QHP, 11-20: HCPCS | Performed by: FAMILY MEDICINE

## 2021-02-05 RX ORDER — SUMATRIPTAN 50 MG/1
TABLET, FILM COATED ORAL
Qty: 6 TABLET | Refills: 2 | Status: SHIPPED | OUTPATIENT
Start: 2021-02-05 | End: 2021-02-08

## 2021-02-05 RX ORDER — TOPIRAMATE 50 MG/1
50 TABLET, FILM COATED ORAL 2 TIMES DAILY
Qty: 180 TABLET | Refills: 3 | Status: SHIPPED | OUTPATIENT
Start: 2021-02-05 | End: 2021-07-19

## 2021-02-05 NOTE — PROGRESS NOTES
Virtual/Telephone Check-In    America Guaman verbally consents to a Virtual/Telephone Check-In service on 02/05/21. Patient has been referred to the [facility] website at [email or web address] to review the yearly Consent to Treat document.   Patient un Inject 15 mg as directed once a week. • ibuprofen 600 MG Oral Tab Take 1 tablet (600 mg total) by mouth every 6 (six) hours as needed for Pain. 30 tablet 0   • folic acid 1 MG Oral Tab Take 1 mg by mouth daily.      • Sertraline HCl 100 MG Oral Tab Take Use      Smoking status: Former Smoker        Packs/day: 0.30        Years: 15.00        Pack years: 4.5        Types: Cigarettes        Quit date: 2014        Years since quittin.6      Smokeless tobacco: Never Used    Alcohol use: No      Alcoho Questions encouraged and answered to patient's satisfaction. - topiramate 50 MG Oral Tab; Take 1 tablet (50 mg total) by mouth 2 (two) times daily. Dispense: 180 tablet; Refill: 3  - SUMAtriptan Succinate 50 MG Oral Tab;  Take at first sign/symptom of m

## 2021-02-08 ENCOUNTER — TELEPHONE (OUTPATIENT)
Dept: FAMILY MEDICINE CLINIC | Facility: CLINIC | Age: 41
End: 2021-02-08

## 2021-02-08 DIAGNOSIS — G43.109 MIGRAINE WITH AURA AND WITHOUT STATUS MIGRAINOSUS, NOT INTRACTABLE: ICD-10-CM

## 2021-02-08 DIAGNOSIS — Z79.899 ENCOUNTER FOR LONG-TERM CURRENT USE OF MEDICATION: ICD-10-CM

## 2021-02-08 RX ORDER — SUMATRIPTAN 50 MG/1
TABLET, FILM COATED ORAL
Qty: 6 TABLET | Refills: 2 | Status: SHIPPED | OUTPATIENT
Start: 2021-02-08

## 2021-02-08 NOTE — TELEPHONE ENCOUNTER
Received fax from pharmacy requesting sig on the SUMATRIPTAN indicate how many tabs at onset of headache and also max dose daily. Please advise.

## 2021-05-03 ENCOUNTER — PATIENT MESSAGE (OUTPATIENT)
Dept: FAMILY MEDICINE CLINIC | Facility: CLINIC | Age: 41
End: 2021-05-03

## 2021-05-03 NOTE — TELEPHONE ENCOUNTER
From: Rudy Sarmiento  To: Kaleigh Bustos DO  Sent: 5/3/2021 2:45 PM CDT  Subject: Non-Urgent Medical Question    I just left my rheumatologist and they want me to see a GI Dr ASAP. I'm having some pretty bad stomach issues.  Acid reflux and things, I

## 2021-05-18 PROBLEM — K21.9 GERD (GASTROESOPHAGEAL REFLUX DISEASE): Status: ACTIVE | Noted: 2021-05-03

## 2021-05-18 NOTE — H&P (VIEW-ONLY)
[facility] - History and Physical                                                                                         Patient presents with:  Establish Care: Change in bowel habits      HISTORY OF PRESENT ILLNESS:   Jolynn Guevara sweats 2020    Not all the time   • Pain in joint, shoulder region 5/8/12    right shoulder   • Pain in joints 2001    Lupus pain in joints   • Pain with bowel movements 03/2021   • Perennial allergic rhinitis with seasonal variation 5/3/2018   • Sleep dis mouth daily. 90 tablet 3   • topiramate 50 MG Oral Tab Take 1 tablet (50 mg total) by mouth 2 (two) times daily. 180 tablet 3   • RASUVO 15 MG/0.3ML Subcutaneous Solution Auto-injector Inject 15 mg as directed once a week.      • folic acid 1 MG Oral Tab Ta effusion/severe erythema  BEHAVIOR/PSYCH:  negative for psychotic behavior       PHYSICAL EXAM:   Blood pressure 131/71, pulse 96, temperature 97.1 °F (36.2 °C), temperature source Temporal, height 5' 6\" (1.676 m), weight 131 lb (59.4 kg), last menstrual

## 2021-05-31 ENCOUNTER — LAB ENCOUNTER (OUTPATIENT)
Dept: LAB | Facility: HOSPITAL | Age: 41
End: 2021-05-31
Attending: INTERNAL MEDICINE
Payer: COMMERCIAL

## 2021-05-31 DIAGNOSIS — R19.4 CHANGE IN BOWEL HABITS: ICD-10-CM

## 2021-06-02 ENCOUNTER — ANESTHESIA EVENT (OUTPATIENT)
Dept: ENDOSCOPY | Facility: HOSPITAL | Age: 41
End: 2021-06-02
Payer: COMMERCIAL

## 2021-06-02 ENCOUNTER — ANESTHESIA (OUTPATIENT)
Dept: ENDOSCOPY | Facility: HOSPITAL | Age: 41
End: 2021-06-02
Payer: COMMERCIAL

## 2021-06-02 ENCOUNTER — HOSPITAL ENCOUNTER (OUTPATIENT)
Facility: HOSPITAL | Age: 41
Setting detail: HOSPITAL OUTPATIENT SURGERY
Discharge: HOME | End: 2021-06-02
Attending: INTERNAL MEDICINE | Admitting: INTERNAL MEDICINE
Payer: COMMERCIAL

## 2021-06-02 VITALS
HEART RATE: 77 BPM | DIASTOLIC BLOOD PRESSURE: 67 MMHG | RESPIRATION RATE: 16 BRPM | WEIGHT: 123 LBS | OXYGEN SATURATION: 100 % | BODY MASS INDEX: 19.77 KG/M2 | HEIGHT: 66 IN | TEMPERATURE: 98 F | SYSTOLIC BLOOD PRESSURE: 115 MMHG

## 2021-06-02 DIAGNOSIS — R19.4 CHANGE IN BOWEL HABITS: Primary | ICD-10-CM

## 2021-06-02 DIAGNOSIS — R93.3 ABNORMAL FINDING ON GI TRACT IMAGING: ICD-10-CM

## 2021-06-02 PROCEDURE — 81025 URINE PREGNANCY TEST: CPT | Performed by: INTERNAL MEDICINE

## 2021-06-02 PROCEDURE — 0DJD8ZZ INSPECTION OF LOWER INTESTINAL TRACT, VIA NATURAL OR ARTIFICIAL OPENING ENDOSCOPIC: ICD-10-PCS | Performed by: INTERNAL MEDICINE

## 2021-06-02 PROCEDURE — 0DB68ZX EXCISION OF STOMACH, VIA NATURAL OR ARTIFICIAL OPENING ENDOSCOPIC, DIAGNOSTIC: ICD-10-PCS | Performed by: INTERNAL MEDICINE

## 2021-06-02 PROCEDURE — 88305 TISSUE EXAM BY PATHOLOGIST: CPT | Performed by: INTERNAL MEDICINE

## 2021-06-02 PROCEDURE — 0DB98ZX EXCISION OF DUODENUM, VIA NATURAL OR ARTIFICIAL OPENING ENDOSCOPIC, DIAGNOSTIC: ICD-10-PCS | Performed by: INTERNAL MEDICINE

## 2021-06-02 RX ORDER — LIDOCAINE HYDROCHLORIDE 10 MG/ML
INJECTION, SOLUTION EPIDURAL; INFILTRATION; INTRACAUDAL; PERINEURAL AS NEEDED
Status: DISCONTINUED | OUTPATIENT
Start: 2021-06-02 | End: 2021-06-02 | Stop reason: SURG

## 2021-06-02 RX ORDER — SODIUM CHLORIDE, SODIUM LACTATE, POTASSIUM CHLORIDE, CALCIUM CHLORIDE 600; 310; 30; 20 MG/100ML; MG/100ML; MG/100ML; MG/100ML
INJECTION, SOLUTION INTRAVENOUS CONTINUOUS
Status: DISCONTINUED | OUTPATIENT
Start: 2021-06-02 | End: 2021-06-02

## 2021-06-02 RX ORDER — KETAMINE HYDROCHLORIDE 50 MG/ML
INJECTION, SOLUTION, CONCENTRATE INTRAMUSCULAR; INTRAVENOUS AS NEEDED
Status: DISCONTINUED | OUTPATIENT
Start: 2021-06-02 | End: 2021-06-02 | Stop reason: SURG

## 2021-06-02 RX ADMIN — KETAMINE HYDROCHLORIDE 15 MG: 50 INJECTION, SOLUTION, CONCENTRATE INTRAMUSCULAR; INTRAVENOUS at 08:49:00

## 2021-06-02 RX ADMIN — LIDOCAINE HYDROCHLORIDE 25 MG: 10 INJECTION, SOLUTION EPIDURAL; INFILTRATION; INTRACAUDAL; PERINEURAL at 08:49:00

## 2021-06-02 NOTE — ANESTHESIA POSTPROCEDURE EVALUATION
[facility]    Rudy Sarmiento Patient Status:  Hospital Outpatient Surgery   Age/Gender 36year old female MRN [id number]   Location [address] [facility] Attending Alexa Farrell, [address] [facility] Day # 0 PCP DO Prachi Price

## 2021-06-02 NOTE — OPERATIVE REPORT
Oneal Carlson Patient Status:  Hospital Outpatient Surgery    1980 MRN [id number]   Location [address] [place] [address] Attending Morelia Soria DO   Hosp Day # 0 PCP Demarcus Carrasco DO       PREOPERATIVE DIAGNOSIS/INDICA were normal.  Transverse colon: The mucosa and vascular pattern were normal.  Descending colon: The mucosa and vascular pattern were normal.  Sigmoid colon: The mucosa and vascular pattern were normal.  Rectum:  The mucosa and vascular pattern were normal.

## 2021-06-02 NOTE — INTERVAL H&P NOTE
Pre-op Diagnosis: Change in bowel habits [R19.4]  Abnormal finding on GI tract imaging [R93.3]    The above referenced H&P was reviewed by Jaron Jones DO on 6/2/2021, the patient was examined and no significant changes have occurred in the patient's c

## 2021-06-02 NOTE — ANESTHESIA PREPROCEDURE EVALUATION
PRE-OP EVALUATION    Patient Name: Lizet Dillard    Admit Diagnosis: Change in bowel habits [R19.4]  Abnormal finding on GI tract imaging [R93.3]    Pre-op Diagnosis: Change in bowel habits [R19.4]  Abnormal finding on GI tract imaging [R93.3]    ESOP BY MOUTH ONCE A DAY AT BEDTIME, Disp: , Rfl: , 6/1/2021 at Unknown time  Montelukast Sodium 10 MG Oral Tab, Take 1 tablet (10 mg total) by mouth nightly., Disp: 90 tablet, Rfl: 3, 6/1/2021 at Unknown time  ALPRAZolam 0.25 MG Oral Tab, Take 0.25 mg by mouth Past Surgical History:   Procedure Laterality Date   • COLPOSCOPY, CERVIX INC UPPER/ADJACENT VAGINA     • COLPOSCOPY,LOOP ELECTRD CERVIX EXCIS     • CONIZATION CERVIX,LOOP ELECTRD     • SALPINGECTOMY Bilateral 12/09/2020    Laparoscopy   • TONS

## 2021-07-19 ENCOUNTER — OFFICE VISIT (OUTPATIENT)
Dept: FAMILY MEDICINE CLINIC | Facility: CLINIC | Age: 41
End: 2021-07-19
Payer: COMMERCIAL

## 2021-07-19 VITALS
OXYGEN SATURATION: 98 % | BODY MASS INDEX: 19.77 KG/M2 | TEMPERATURE: 98 F | SYSTOLIC BLOOD PRESSURE: 112 MMHG | HEIGHT: 66 IN | DIASTOLIC BLOOD PRESSURE: 60 MMHG | WEIGHT: 123 LBS | HEART RATE: 67 BPM

## 2021-07-19 DIAGNOSIS — J30.2 PERENNIAL ALLERGIC RHINITIS WITH SEASONAL VARIATION: ICD-10-CM

## 2021-07-19 DIAGNOSIS — Z00.00 ROUTINE GENERAL MEDICAL EXAMINATION AT A HEALTH CARE FACILITY: Primary | ICD-10-CM

## 2021-07-19 DIAGNOSIS — R05.9 COUGH: ICD-10-CM

## 2021-07-19 DIAGNOSIS — Z12.31 ENCOUNTER FOR SCREENING MAMMOGRAM FOR MALIGNANT NEOPLASM OF BREAST: ICD-10-CM

## 2021-07-19 DIAGNOSIS — G43.109 MIGRAINE WITH AURA AND WITHOUT STATUS MIGRAINOSUS, NOT INTRACTABLE: ICD-10-CM

## 2021-07-19 DIAGNOSIS — J98.01 BRONCHOSPASM: ICD-10-CM

## 2021-07-19 DIAGNOSIS — J30.89 PERENNIAL ALLERGIC RHINITIS WITH SEASONAL VARIATION: ICD-10-CM

## 2021-07-19 DIAGNOSIS — R10.84 GENERALIZED ABDOMINAL PAIN: ICD-10-CM

## 2021-07-19 DIAGNOSIS — R63.4 UNINTENTIONAL WEIGHT LOSS: ICD-10-CM

## 2021-07-19 DIAGNOSIS — Z79.899 ENCOUNTER FOR LONG-TERM CURRENT USE OF MEDICATION: ICD-10-CM

## 2021-07-19 PROCEDURE — 99214 OFFICE O/P EST MOD 30 MIN: CPT | Performed by: FAMILY MEDICINE

## 2021-07-19 PROCEDURE — 99396 PREV VISIT EST AGE 40-64: CPT | Performed by: FAMILY MEDICINE

## 2021-07-19 PROCEDURE — 3008F BODY MASS INDEX DOCD: CPT | Performed by: FAMILY MEDICINE

## 2021-07-19 PROCEDURE — 3078F DIAST BP <80 MM HG: CPT | Performed by: FAMILY MEDICINE

## 2021-07-19 PROCEDURE — 3074F SYST BP LT 130 MM HG: CPT | Performed by: FAMILY MEDICINE

## 2021-07-19 RX ORDER — MONTELUKAST SODIUM 10 MG/1
10 TABLET ORAL NIGHTLY
Qty: 90 TABLET | Refills: 3 | Status: SHIPPED | OUTPATIENT
Start: 2021-07-19

## 2021-07-19 RX ORDER — TOPIRAMATE 50 MG/1
50 TABLET, FILM COATED ORAL 2 TIMES DAILY
Qty: 180 TABLET | Refills: 3 | Status: SHIPPED | OUTPATIENT
Start: 2021-07-19

## 2021-07-19 RX ORDER — ALBUTEROL SULFATE 90 UG/1
2 AEROSOL, METERED RESPIRATORY (INHALATION) EVERY 6 HOURS PRN
Qty: 1 EACH | Refills: 0 | Status: SHIPPED | OUTPATIENT
Start: 2021-07-19

## 2021-07-19 NOTE — PATIENT INSTRUCTIONS
Prevention Guidelines, Women Ages 36 to 52  Screening tests and vaccines are an important part of managing your health. A screening test is done to find diseases in people who don't have any symptoms.  The goal is to find a disease early so lifestyle bethea sigmoidoscopy every 5 years, or  · Colonoscopy every 10 years, or  · CT colonography (virtual colonoscopy) every 5 years, or  · Yearly fecal occult blood test, or  · Yearly fecal immunochemical test every year, or  · Stool DNA test, every 3 years  If you c least 4 weeks after the first dose   Hepatitis A Women at increased risk for infection–talk with your healthcare provider 2 doses given 6 months apart   Hepatitis B Women at increased risk for infection–talk with your healthcare provider 3 doses over 6 mon American Academy of Ophthalmology  Suha last reviewed this educational content on 11/1/2017  © 5160-7926 [employer]. All rights reserved. This information is not intended as a substitute for professional medical care.  Always follow your provider. Follow-up care  Follow up with your healthcare provider, or as advised.   If you are age 72 or older, have a chronic lung disease or condition that affects your immune system, or you smoke, ask your healthcare provider about getting a pneumococca

## 2021-07-19 NOTE — PROGRESS NOTES
HPI:   Meek Rodriguez is a 39year old female       Last mammogram: Due    Abdominal pain:  Associated with unintentional weight loss. Unintentional weight loss, started around the same time she started the methotrexate for treatment of lupus.   June 2 Montelukast Sodium 10 MG Oral Tab Take 1 tablet (10 mg total) by mouth nightly. 90 tablet 3   • topiramate 50 MG Oral Tab Take 1 tablet (50 mg total) by mouth 2 (two) times daily.  180 tablet 3   • busPIRone HCl 15 MG Oral Tab Take by mouth 2 (two) times a tattoo    • Depression    • Dizziness and giddiness    • Easy bruising    • Fatigue     Im always tired   • Flatulence/gas pain/belching 03/2021   • Heartburn 03/2021   • Heavy menses 11/2020    After i went off birth control and had my tibes tied 12/2020 61years old      Social History:   Social History    Tobacco Use      Smoking status: Former Smoker        Packs/day: 0.50        Years: 24.00        Pack years: 12        Types: Cigarettes        Quit date: 2019        Years since quittin.5 S1S2  ABD: Tender to palpation left lower quadrant, however much more so of right lower quadrant with guarding of right lower quadrant. MUSCULOSKELETAL: gait normal, no gross M/S defect.   EXTREMITIES: no clubbing, cyanosis, or edema  NEURO: Alert and orie protein. Aerobic exercise 30 minutes five days a week for cardiovascular fitness and 45-60 minutes 6-7 days a week for weight loss. - LIPID PANEL    2.  Encounter for screening mammogram for malignant neoplasm of breast  - [facility] GANGA 2D+3D SCREENING JAZZY every 6 (six) hours as needed for Wheezing or Shortness of Breath (Cough). • Montelukast Sodium 10 MG Oral Tab 90 tablet 3     Sig: Take 1 tablet (10 mg total) by mouth nightly.    • topiramate 50 MG Oral Tab 180 tablet 3     Sig: Take 1 tablet (50 mg tot

## 2021-07-24 ENCOUNTER — LABORATORY ENCOUNTER (OUTPATIENT)
Dept: LAB | Age: 41
End: 2021-07-24
Attending: FAMILY MEDICINE
Payer: COMMERCIAL

## 2021-07-24 LAB
CHOLEST SMN-MCNC: 153 MG/DL (ref ?–200)
HDLC SERPL-MCNC: 72 MG/DL (ref 40–59)
LDLC SERPL CALC-MCNC: 71 MG/DL (ref ?–100)
NONHDLC SERPL-MCNC: 81 MG/DL (ref ?–130)
PATIENT FASTING Y/N/NP: YES
TRIGL SERPL-MCNC: 43 MG/DL (ref 30–149)
VLDLC SERPL CALC-MCNC: 7 MG/DL (ref 0–30)

## 2021-07-26 ENCOUNTER — OFFICE VISIT (OUTPATIENT)
Dept: FAMILY MEDICINE CLINIC | Facility: CLINIC | Age: 41
End: 2021-07-26
Payer: COMMERCIAL

## 2021-07-26 VITALS
SYSTOLIC BLOOD PRESSURE: 100 MMHG | HEART RATE: 77 BPM | DIASTOLIC BLOOD PRESSURE: 60 MMHG | BODY MASS INDEX: 19.77 KG/M2 | WEIGHT: 123 LBS | OXYGEN SATURATION: 98 % | HEIGHT: 66 IN | TEMPERATURE: 98 F

## 2021-07-26 DIAGNOSIS — J30.89 PERENNIAL ALLERGIC RHINITIS WITH SEASONAL VARIATION: ICD-10-CM

## 2021-07-26 DIAGNOSIS — J30.2 PERENNIAL ALLERGIC RHINITIS WITH SEASONAL VARIATION: ICD-10-CM

## 2021-07-26 DIAGNOSIS — J98.01 COUGH DUE TO BRONCHOSPASM: Primary | ICD-10-CM

## 2021-07-26 PROCEDURE — 3008F BODY MASS INDEX DOCD: CPT | Performed by: FAMILY MEDICINE

## 2021-07-26 PROCEDURE — 3078F DIAST BP <80 MM HG: CPT | Performed by: FAMILY MEDICINE

## 2021-07-26 PROCEDURE — 99214 OFFICE O/P EST MOD 30 MIN: CPT | Performed by: FAMILY MEDICINE

## 2021-07-26 PROCEDURE — 3074F SYST BP LT 130 MM HG: CPT | Performed by: FAMILY MEDICINE

## 2021-07-26 RX ORDER — BUDESONIDE AND FORMOTEROL FUMARATE DIHYDRATE 160; 4.5 UG/1; UG/1
2 AEROSOL RESPIRATORY (INHALATION) 2 TIMES DAILY
Qty: 1 EACH | Refills: 0 | Status: SHIPPED | OUTPATIENT
Start: 2021-07-26 | End: 2021-07-28

## 2021-07-26 NOTE — PROGRESS NOTES
Pool Oswald is a 39year old female. HPI:       Cough and wheezing:  Was doing better since last ov, wheezing started today. Also feels tight. Albuterol this morning without relief. Patient would like to avoid oral/systemic corticosteroids. acid 1 MG Oral Tab Take 1 mg by mouth daily. • Sertraline HCl 100 MG Oral Tab Take 200 mg by mouth daily.  Take 2 tab once a day      • traZODone HCl 50 MG Oral Tab TAKE 1 TO 2 TABLETS BY MOUTH ONCE A DAY AT BEDTIME     • ALPRAZolam 0.25 MG Oral Tab So Carlin 5/3/2018   • Sleep disturbance 01/2021   • Stress    • Visual impairment     glasses   • Wears glasses    • Weight loss 03/2021   • Wheezing     I have wheezing caused by allergies      Past Surgical History:   Procedure Laterality Date   • COLONOSCOPY N/A 03/03/1982      OPV                   09/04/1980 12/26/1980 05/05/1982      TDAP                  04/02/2018  05/15/2021    Deferred                Date(s) Deferred    Influenza Vaccine Refused                          09/23/2020      EXAM:    Budesonide-Formoterol Fumarate (SYMBICORT) 160-4.5 MCG/ACT Inhalation Aerosol; Inhale 2 puffs into the lungs 2 (two) times daily. Dispense: 1 each; Refill: 0    2. Perennial allergic rhinitis with seasonal variation  Worsening symptoms.   Continue Claritin

## 2021-07-27 ENCOUNTER — PATIENT MESSAGE (OUTPATIENT)
Dept: FAMILY MEDICINE CLINIC | Facility: CLINIC | Age: 41
End: 2021-07-27

## 2021-07-27 ENCOUNTER — TELEPHONE (OUTPATIENT)
Dept: FAMILY MEDICINE CLINIC | Facility: CLINIC | Age: 41
End: 2021-07-27

## 2021-07-27 NOTE — TELEPHONE ENCOUNTER
Attempted to call pharmacy. Was on hold for a along time. Did send [employer] message to patient advising she call her plan and ask for formulary.

## 2021-07-27 NOTE — TELEPHONE ENCOUNTER
Please contact patient's pharmacist and inquire as to whether there may be a alternate combination inhaled corticosteroid/long-acting bronchodilator (ICS/LABA) that would have less out-of-pocket cost for the patient such as Beverli Loh, [employer], or [employer]

## 2021-07-27 NOTE — TELEPHONE ENCOUNTER
From: Anne Marie Ojeda  To: Elia Hancock DO  Sent: 7/27/2021 7:48 AM CDT  Subject: Prescription Question    Good Morning! The Symbicort you called in for me is $80 with my insurance, and I cannot afford that. Is there some other options out there?

## 2021-07-27 NOTE — TELEPHONE ENCOUNTER
From: Morena Hou  To: Dakota Mauricio DO  Sent: 7/27/2021  7:48 AM CDT  Subject: Prescription Question    Good Morning! The Symbicort you called in for me is $80 with my insurance, and I cannot afford that.  Is there some other options out there

## 2021-07-28 RX ORDER — METHYLPREDNISOLONE 4 MG/1
TABLET ORAL
Qty: 1 EACH | Refills: 0 | Status: SHIPPED | OUTPATIENT
Start: 2021-07-28 | End: 2021-12-07

## 2021-07-28 NOTE — TELEPHONE ENCOUNTER
Marlys Hernandez  to Timo Drivers, DO    KF    7:18 AM  Good Morning! !     I checked online at the [employer] and Air Duo are covered but they are also $80. The Elsy Conteh is not covered at all. Do any of these come in a generic that maybe cheaper?  I did

## 2021-07-28 NOTE — TELEPHONE ENCOUNTER
Prescription sent for Medrol Dosepak, please inform patient. In the future, Anais Shah would be an option.

## 2021-07-31 PROBLEM — J98.01 BRONCHOSPASM: Status: ACTIVE | Noted: 2021-07-31

## 2021-07-31 PROBLEM — Z79.899 ENCOUNTER FOR LONG-TERM CURRENT USE OF MEDICATION: Status: ACTIVE | Noted: 2021-07-31

## 2021-07-31 PROBLEM — R63.4 UNINTENTIONAL WEIGHT LOSS: Status: ACTIVE | Noted: 2021-07-31

## 2021-07-31 PROBLEM — R10.84 GENERALIZED ABDOMINAL PAIN: Status: ACTIVE | Noted: 2021-07-31

## 2021-07-31 PROBLEM — R05.9 COUGH: Status: ACTIVE | Noted: 2021-07-31

## 2021-08-02 ENCOUNTER — TELEPHONE (OUTPATIENT)
Dept: FAMILY MEDICINE CLINIC | Facility: CLINIC | Age: 41
End: 2021-08-02

## 2021-08-02 ENCOUNTER — PATIENT MESSAGE (OUTPATIENT)
Dept: FAMILY MEDICINE CLINIC | Facility: CLINIC | Age: 41
End: 2021-08-02

## 2021-08-02 DIAGNOSIS — Z20.822 SUSPECTED COVID-19 VIRUS INFECTION: Primary | ICD-10-CM

## 2021-08-02 RX ORDER — AZITHROMYCIN 250 MG/1
TABLET, FILM COATED ORAL
Qty: 6 TABLET | Refills: 0 | Status: SHIPPED | OUTPATIENT
Start: 2021-08-02 | End: 2021-08-07

## 2021-08-02 NOTE — TELEPHONE ENCOUNTER
Symptoms are as described below. Denies shortness of breath. States it feels like she has a \"head cold and chest cold\". Please advise.   Did pend COVID test.

## 2021-08-02 NOTE — TELEPHONE ENCOUNTER
Patient seen for the symptoms on 7/19/2021 and 7/26/2021. Recommend treat empirically for possible bacterial infection. Prescription sent for azithromycin. Please have patient call with update on condition in 2 to 3 days.

## 2021-08-02 NOTE — TELEPHONE ENCOUNTER
From: Toni Salinas  To: Jose Martin Arellano DO  Sent: 8/2/2021  8:32 AM CDT  Subject: Non-Urgent Medical Question    Good Morning! I have a quick question. I am on day 3 of the medrol dose pack. I woke up this morning and I am very sick.  Body aches,

## 2021-08-02 NOTE — TELEPHONE ENCOUNTER
From: Phyllis Vargas  To: Jennifer Bedoya DO  Sent: 8/2/2021 8:32 AM CDT  Subject: Non-Urgent Medical Question    Good Morning! I have a quick question. I am on day 3 of the medrol dose pack. I woke up this morning and I am very sick.  Body aches, C

## 2021-08-14 ENCOUNTER — HOSPITAL ENCOUNTER (OUTPATIENT)
Dept: MAMMOGRAPHY | Age: 41
Discharge: HOME | End: 2021-08-14
Attending: FAMILY MEDICINE
Payer: COMMERCIAL

## 2021-08-14 DIAGNOSIS — Z12.31 ENCOUNTER FOR SCREENING MAMMOGRAM FOR MALIGNANT NEOPLASM OF BREAST: ICD-10-CM

## 2021-08-14 PROCEDURE — 77063 BREAST TOMOSYNTHESIS BI: CPT | Performed by: FAMILY MEDICINE

## 2021-08-14 PROCEDURE — 77067 SCR MAMMO BI INCL CAD: CPT | Performed by: FAMILY MEDICINE

## 2021-12-06 RX ORDER — METHOTREXATE 20 MG/.4ML
INJECTION, SOLUTION SUBCUTANEOUS
COMMUNITY
Start: 2021-08-06 | End: 2021-12-07

## 2021-12-07 ENCOUNTER — OFFICE VISIT (OUTPATIENT)
Dept: FAMILY MEDICINE CLINIC | Facility: CLINIC | Age: 41
End: 2021-12-07
Payer: COMMERCIAL

## 2021-12-07 VITALS
OXYGEN SATURATION: 99 % | HEART RATE: 100 BPM | SYSTOLIC BLOOD PRESSURE: 104 MMHG | WEIGHT: 119 LBS | DIASTOLIC BLOOD PRESSURE: 68 MMHG | BODY MASS INDEX: 19.13 KG/M2 | TEMPERATURE: 97 F | HEIGHT: 66 IN | RESPIRATION RATE: 16 BRPM

## 2021-12-07 DIAGNOSIS — Z23 NEED FOR INFLUENZA VACCINATION: ICD-10-CM

## 2021-12-07 DIAGNOSIS — J45.901 ASTHMA EXACERBATION, MILD: ICD-10-CM

## 2021-12-07 DIAGNOSIS — H92.03 ACUTE PAIN OF BOTH EARS: ICD-10-CM

## 2021-12-07 DIAGNOSIS — H69.83 ACUTE DYSFUNCTION OF EUSTACHIAN TUBE, BILATERAL: Primary | ICD-10-CM

## 2021-12-07 PROCEDURE — 99213 OFFICE O/P EST LOW 20 MIN: CPT | Performed by: FAMILY MEDICINE

## 2021-12-07 PROCEDURE — 3078F DIAST BP <80 MM HG: CPT | Performed by: FAMILY MEDICINE

## 2021-12-07 PROCEDURE — 90471 IMMUNIZATION ADMIN: CPT | Performed by: FAMILY MEDICINE

## 2021-12-07 PROCEDURE — 3074F SYST BP LT 130 MM HG: CPT | Performed by: FAMILY MEDICINE

## 2021-12-07 PROCEDURE — 90686 IIV4 VACC NO PRSV 0.5 ML IM: CPT | Performed by: FAMILY MEDICINE

## 2021-12-07 PROCEDURE — 3008F BODY MASS INDEX DOCD: CPT | Performed by: FAMILY MEDICINE

## 2021-12-07 RX ORDER — MELOXICAM 15 MG/1
TABLET ORAL
COMMUNITY
Start: 2021-10-13 | End: 2021-12-07

## 2021-12-07 RX ORDER — CYCLOBENZAPRINE HCL 5 MG
TABLET ORAL
COMMUNITY
Start: 2021-10-13

## 2021-12-07 RX ORDER — PREDNISONE 20 MG/1
TABLET ORAL
Qty: 10 TABLET | Refills: 0 | Status: SHIPPED | OUTPATIENT
Start: 2021-12-07 | End: 2022-01-21 | Stop reason: ALTCHOICE

## 2021-12-07 NOTE — PROGRESS NOTES
Sravanthi Diaz is a 39year old female. HPI:   Patient presents with:  Ear Problem: x2.5 weeks      This 57-year-old female presents to the office with a 2-1/2-week history of diminished hearing to both ears and bilateral ear discomfort.   The patien • Muscle weakness     due to lupus   • Nausea 04/2021    Occasionally   • Night sweats 2020    Not all the time   • Pain in joint, shoulder region 5/8/12    right shoulder   • Pain in joints 2001    Lupus pain in joints   • Pain with bowel movements 03/ into the lungs every 6 (six) hours as needed for Wheezing or Shortness of Breath (Cough). 1 each 0   • Montelukast Sodium 10 MG Oral Tab Take 1 tablet (10 mg total) by mouth nightly.  90 tablet 3   • topiramate 50 MG Oral Tab Take 1 tablet (50 mg total) by (1.676 m)   Wt 119 lb (54 kg)   LMP 05/29/2021   SpO2 99%   BMI 19.21 kg/m²     General: Well-nourished, well hydrated. No respiratory distress. No pallor. HEENT: Normocephalic, atraumatic. BRENDAN, EOMI, Sclera clear and non icteric bilaterally.  TM's nor office. Side effects were discussed.     - FLULAVAL INFLUENZA VACCINE QUAD PRESERVATIVE FREE 0.5 ML        Meds This Visit:  Requested Prescriptions     Signed Prescriptions Disp Refills   • predniSONE 20 MG Oral Tab 10 tablet 0     Sig: Take 2 tablets onc

## 2021-12-07 NOTE — PATIENT INSTRUCTIONS
Start the Prednisone tomorrow. Take the Prednisone 20 mg 2 tablets once daily with breakfast for 5 days. Take your prednisone with a full meal and early in the day. Do not take any Ibuprofen, Advil, Motrin, Naproxen, Aspirin while on Prednisone.  Tylenol i

## 2021-12-13 ENCOUNTER — PATIENT MESSAGE (OUTPATIENT)
Dept: FAMILY MEDICINE CLINIC | Facility: CLINIC | Age: 41
End: 2021-12-13

## 2021-12-13 RX ORDER — PREDNISONE 20 MG/1
20 TABLET ORAL DAILY
Qty: 5 TABLET | Refills: 0 | Status: SHIPPED | OUTPATIENT
Start: 2021-12-13 | End: 2022-01-21 | Stop reason: DRUGHIGH

## 2021-12-13 NOTE — TELEPHONE ENCOUNTER
From: Chuckie Aleman  To: Nick Simmons DO  Sent: 12/13/2021 7:04 AM CST  Subject: follow up question from 12-7-21    I know I was suppose to call in 12-10-21, but by the time I got off of work the office was closed.  I finished the Prednisone 12-12

## 2021-12-13 NOTE — TELEPHONE ENCOUNTER
Will give 5 additional days of Prednisone 20 mg but just one tablet daily. If does not continue to improve, then would recommend ENT referral. I sent the Rx to the patient's pharmacy.

## 2021-12-13 NOTE — TELEPHONE ENCOUNTER
LOV 12/7/21   Return in about 3 days (around 12/10/2021), or if symptoms worsen. Start the Prednisone tomorrow. Take the Prednisone 20 mg 2 tablets once daily with breakfast for 5 days. Take your prednisone with a full meal and early in the day.   Do not t

## 2021-12-20 PROBLEM — H91.21 SUDDEN RIGHT HEARING LOSS: Status: ACTIVE | Noted: 2021-12-20

## 2021-12-20 PROBLEM — H90.5 SENSORINEURAL HEARING LOSS (SNHL), UNSPECIFIED LATERALITY: Status: ACTIVE | Noted: 2021-12-20

## 2021-12-31 DIAGNOSIS — G93.9 GLIOSIS: Primary | ICD-10-CM

## 2022-01-18 ENCOUNTER — PATIENT MESSAGE (OUTPATIENT)
Dept: FAMILY MEDICINE CLINIC | Facility: CLINIC | Age: 42
End: 2022-01-18

## 2022-01-18 LAB — AMB EXT COVID-19 RESULT: DETECTED

## 2022-01-18 NOTE — TELEPHONE ENCOUNTER
From: Poncho Alexander  To: Reina Barnett DO  Sent: 1/18/2022 10:56 AM CST  Subject: Covid    Good morning,    Just wanted to let you know I have tested positive for Covid this morning.  With my lupus I was not sure if I needed to do anything or just l

## 2022-01-21 ENCOUNTER — TELEPHONE (OUTPATIENT)
Dept: FAMILY MEDICINE CLINIC | Facility: CLINIC | Age: 42
End: 2022-01-21

## 2022-01-21 ENCOUNTER — TELEMEDICINE (OUTPATIENT)
Dept: FAMILY MEDICINE CLINIC | Facility: CLINIC | Age: 42
End: 2022-01-21

## 2022-01-21 ENCOUNTER — PATIENT MESSAGE (OUTPATIENT)
Dept: FAMILY MEDICINE CLINIC | Facility: CLINIC | Age: 42
End: 2022-01-21

## 2022-01-21 VITALS — OXYGEN SATURATION: 97 %

## 2022-01-21 DIAGNOSIS — U07.1 ACUTE BRONCHITIS DUE TO SEVERE ACUTE RESPIRATORY SYNDROME CORONAVIRUS 2 (SARS-COV-2): Primary | ICD-10-CM

## 2022-01-21 DIAGNOSIS — J45.31 MILD PERSISTENT ASTHMA WITH (ACUTE) EXACERBATION: ICD-10-CM

## 2022-01-21 DIAGNOSIS — J20.8 ACUTE BRONCHITIS DUE TO SEVERE ACUTE RESPIRATORY SYNDROME CORONAVIRUS 2 (SARS-COV-2): Primary | ICD-10-CM

## 2022-01-21 PROCEDURE — 99213 OFFICE O/P EST LOW 20 MIN: CPT | Performed by: FAMILY MEDICINE

## 2022-01-21 RX ORDER — PREDNISONE 10 MG/1
10 TABLET ORAL DAILY
COMMUNITY
Start: 2022-01-03

## 2022-01-21 RX ORDER — SYRINGE, DISPOSABLE, 1 ML
SYRINGE, EMPTY DISPOSABLE MISCELLANEOUS
COMMUNITY
Start: 2022-01-03

## 2022-01-21 RX ORDER — METHOTREXATE 25 MG/ML
INJECTION INTRA-ARTERIAL; INTRAMUSCULAR; INTRATHECAL; INTRAVENOUS
COMMUNITY
Start: 2022-01-02

## 2022-01-21 NOTE — TELEPHONE ENCOUNTER
Julieth Stoll never had a fever. I just started taking sudafed today, I haven’t taken anything over the counter at all. I was tested Monday for a medical procedure. I began to feel like I was getting a cold that evening.  By Tuesday I was really sick and got the

## 2022-01-21 NOTE — PROGRESS NOTES
MyChart video visit with live interactive synchronous audio video    Akbar Cox verbally consents to a MyChart video visit with live interactive synchronous audio videoservice on 01/21/22.   Patient has been referred to the [facility] website at [email or web address].  Proair inhaler.       O2 saturation 97%        Current Outpatient Medications   Medication Sig Dispense Refill   • Methotrexate Sodium, PF, 50 MG/2ML Injection Solution      • BD TB SYRINGE 25G X 5/8\" 1 ML Does not apply Misc      • predniSONE 10 M Active Problem List:     Lupus ([address]     Fibromyalgia     General counseling for prescription of oral contraceptives     RONNIE (generalized anxiety disorder)     Chronic constipation     Perennial allergic rhinitis with seasonal variation     Therapeutic elizabet GENERAL: Pleasant. Comfortably speaking in full sentences. Mild to moderately-ill appearing, nontoxic appearing. Integument: Face is flushed. HEENT: Eyes: EOMI. Normal conjunctiva. Nose: Positive for nasal discharge.   LUNGS: Moist nonproductive cou

## 2022-01-21 NOTE — TELEPHONE ENCOUNTER
Good Morning,     I was wondering if it is possible that I have developed a sinus infection and possibly an ear infection. My head is all congested and painful just like a sinus infection, and my ears hurt.  My chest is tight and I am wheezing but I seem to

## 2022-01-25 PROBLEM — U07.1 ACUTE BRONCHITIS DUE TO SEVERE ACUTE RESPIRATORY SYNDROME CORONAVIRUS 2 (SARS-COV-2): Status: ACTIVE | Noted: 2022-01-25

## 2022-01-25 PROBLEM — J20.8 ACUTE BRONCHITIS DUE TO SEVERE ACUTE RESPIRATORY SYNDROME CORONAVIRUS 2 (SARS-COV-2): Status: ACTIVE | Noted: 2022-01-25

## 2022-03-24 ENCOUNTER — OFFICE VISIT (OUTPATIENT)
Dept: OBGYN CLINIC | Facility: CLINIC | Age: 42
End: 2022-03-24
Payer: COMMERCIAL

## 2022-03-24 VITALS
BODY MASS INDEX: 19.13 KG/M2 | SYSTOLIC BLOOD PRESSURE: 122 MMHG | HEART RATE: 88 BPM | HEIGHT: 66 IN | WEIGHT: 119 LBS | DIASTOLIC BLOOD PRESSURE: 76 MMHG

## 2022-03-24 DIAGNOSIS — Z12.31 ENCOUNTER FOR SCREENING MAMMOGRAM FOR BREAST CANCER: ICD-10-CM

## 2022-03-24 DIAGNOSIS — Z01.419 WELL WOMAN EXAM WITH ROUTINE GYNECOLOGICAL EXAM: Primary | ICD-10-CM

## 2022-03-24 DIAGNOSIS — Z12.4 ENCOUNTER FOR SCREENING FOR CERVICAL CANCER: ICD-10-CM

## 2022-03-24 PROCEDURE — 99396 PREV VISIT EST AGE 40-64: CPT | Performed by: OBSTETRICS & GYNECOLOGY

## 2022-03-24 PROCEDURE — 3074F SYST BP LT 130 MM HG: CPT | Performed by: OBSTETRICS & GYNECOLOGY

## 2022-03-24 PROCEDURE — 3008F BODY MASS INDEX DOCD: CPT | Performed by: OBSTETRICS & GYNECOLOGY

## 2022-03-24 PROCEDURE — 3078F DIAST BP <80 MM HG: CPT | Performed by: OBSTETRICS & GYNECOLOGY

## 2022-06-02 ENCOUNTER — PATIENT MESSAGE (OUTPATIENT)
Dept: FAMILY MEDICINE CLINIC | Facility: CLINIC | Age: 42
End: 2022-06-02

## 2022-06-28 DIAGNOSIS — J30.2 PERENNIAL ALLERGIC RHINITIS WITH SEASONAL VARIATION: ICD-10-CM

## 2022-06-28 DIAGNOSIS — Z79.899 ENCOUNTER FOR LONG-TERM CURRENT USE OF MEDICATION: ICD-10-CM

## 2022-06-28 DIAGNOSIS — J30.89 PERENNIAL ALLERGIC RHINITIS WITH SEASONAL VARIATION: ICD-10-CM

## 2022-06-29 RX ORDER — MONTELUKAST SODIUM 10 MG/1
10 TABLET ORAL NIGHTLY
Qty: 90 TABLET | Refills: 0 | Status: SHIPPED | OUTPATIENT
Start: 2022-06-29

## 2022-06-29 NOTE — TELEPHONE ENCOUNTER
Prescription approved for 90-day supply sent to patient's mail order. Please have patient schedule appointment to see me for follow-up on her asthma.

## 2022-07-02 DIAGNOSIS — Z79.899 ENCOUNTER FOR LONG-TERM CURRENT USE OF MEDICATION: ICD-10-CM

## 2022-07-02 DIAGNOSIS — G43.109 MIGRAINE WITH AURA AND WITHOUT STATUS MIGRAINOSUS, NOT INTRACTABLE: ICD-10-CM

## 2022-07-05 RX ORDER — TOPIRAMATE 50 MG/1
TABLET, FILM COATED ORAL
Qty: 180 TABLET | Refills: 3 | OUTPATIENT
Start: 2022-07-05

## 2022-08-11 ENCOUNTER — OFFICE VISIT (OUTPATIENT)
Dept: FAMILY MEDICINE CLINIC | Facility: CLINIC | Age: 42
End: 2022-08-11
Payer: COMMERCIAL

## 2022-08-11 VITALS
DIASTOLIC BLOOD PRESSURE: 80 MMHG | SYSTOLIC BLOOD PRESSURE: 100 MMHG | RESPIRATION RATE: 22 BRPM | BODY MASS INDEX: 19.8 KG/M2 | HEART RATE: 101 BPM | WEIGHT: 123.19 LBS | HEIGHT: 66 IN | OXYGEN SATURATION: 99 % | TEMPERATURE: 97 F

## 2022-08-11 DIAGNOSIS — J45.31 MILD PERSISTENT ASTHMA WITH ACUTE EXACERBATION: ICD-10-CM

## 2022-08-11 DIAGNOSIS — Z79.899 ENCOUNTER FOR LONG-TERM CURRENT USE OF MEDICATION: ICD-10-CM

## 2022-08-11 DIAGNOSIS — G43.109 MIGRAINE WITH AURA AND WITHOUT STATUS MIGRAINOSUS, NOT INTRACTABLE: ICD-10-CM

## 2022-08-11 DIAGNOSIS — Z13.6 SCREENING FOR HEART DISEASE: ICD-10-CM

## 2022-08-11 DIAGNOSIS — Z76.89 ENCOUNTER FOR NEW MEDICATION PRESCRIPTION: ICD-10-CM

## 2022-08-11 DIAGNOSIS — J30.2 PERENNIAL ALLERGIC RHINITIS WITH SEASONAL VARIATION: ICD-10-CM

## 2022-08-11 DIAGNOSIS — M25.551 HIP PAIN, ACUTE, RIGHT: ICD-10-CM

## 2022-08-11 DIAGNOSIS — J30.89 PERENNIAL ALLERGIC RHINITIS WITH SEASONAL VARIATION: ICD-10-CM

## 2022-08-11 DIAGNOSIS — Z00.00 ROUTINE GENERAL MEDICAL EXAMINATION AT A HEALTH CARE FACILITY: Primary | ICD-10-CM

## 2022-08-11 PROCEDURE — 99396 PREV VISIT EST AGE 40-64: CPT | Performed by: FAMILY MEDICINE

## 2022-08-11 PROCEDURE — 99214 OFFICE O/P EST MOD 30 MIN: CPT | Performed by: FAMILY MEDICINE

## 2022-08-11 PROCEDURE — 3008F BODY MASS INDEX DOCD: CPT | Performed by: FAMILY MEDICINE

## 2022-08-11 PROCEDURE — 3079F DIAST BP 80-89 MM HG: CPT | Performed by: FAMILY MEDICINE

## 2022-08-11 PROCEDURE — 3074F SYST BP LT 130 MM HG: CPT | Performed by: FAMILY MEDICINE

## 2022-08-11 RX ORDER — QUETIAPINE FUMARATE 50 MG/1
50 TABLET, FILM COATED ORAL AS DIRECTED
COMMUNITY

## 2022-08-11 RX ORDER — QUETIAPINE FUMARATE 50 MG/1
75 TABLET, FILM COATED ORAL NIGHTLY
COMMUNITY
Start: 2022-07-23

## 2022-08-11 RX ORDER — FLUTICASONE PROPIONATE AND SALMETEROL 250; 50 UG/1; UG/1
1 POWDER RESPIRATORY (INHALATION) EVERY 12 HOURS SCHEDULED
Qty: 1 EACH | Refills: 3 | Status: SHIPPED | OUTPATIENT
Start: 2022-08-11 | End: 2022-08-15

## 2022-08-11 RX ORDER — TOPIRAMATE 50 MG/1
50 TABLET, FILM COATED ORAL 2 TIMES DAILY
Qty: 180 TABLET | Refills: 3 | Status: SHIPPED | OUTPATIENT
Start: 2022-08-11

## 2022-08-11 RX ORDER — MONTELUKAST SODIUM 10 MG/1
10 TABLET ORAL NIGHTLY
Qty: 90 TABLET | Refills: 3 | Status: SHIPPED | OUTPATIENT
Start: 2022-08-11

## 2022-08-11 RX ORDER — BENZTROPINE MESYLATE 1 MG/1
1 TABLET ORAL 2 TIMES DAILY
COMMUNITY
Start: 2022-05-31

## 2022-08-14 ENCOUNTER — PATIENT MESSAGE (OUTPATIENT)
Dept: FAMILY MEDICINE CLINIC | Facility: CLINIC | Age: 42
End: 2022-08-14

## 2022-08-14 DIAGNOSIS — Z76.89 ENCOUNTER FOR NEW MEDICATION PRESCRIPTION: ICD-10-CM

## 2022-08-14 DIAGNOSIS — J45.31 MILD PERSISTENT ASTHMA WITH ACUTE EXACERBATION: ICD-10-CM

## 2022-08-15 RX ORDER — FLUTICASONE PROPIONATE AND SALMETEROL 250; 50 UG/1; UG/1
1 POWDER RESPIRATORY (INHALATION) EVERY 12 HOURS SCHEDULED
Qty: 1 EACH | Refills: 3 | Status: SHIPPED | OUTPATIENT
Start: 2022-08-15

## 2022-08-15 NOTE — TELEPHONE ENCOUNTER
From: Leo Howell  To: Katia Parrish DO  Sent: 8/14/2022 8:30 AM CDT  Subject: Advair     Good Morning!!    The pharmacy needs the Advair called in as a brand name vs. a generic for my insurance to cover it.      Thanks so much,  Bethany Campos

## 2022-08-27 DIAGNOSIS — J30.89 PERENNIAL ALLERGIC RHINITIS WITH SEASONAL VARIATION: ICD-10-CM

## 2022-08-27 DIAGNOSIS — J30.2 PERENNIAL ALLERGIC RHINITIS WITH SEASONAL VARIATION: ICD-10-CM

## 2022-08-27 DIAGNOSIS — Z79.899 ENCOUNTER FOR LONG-TERM CURRENT USE OF MEDICATION: ICD-10-CM

## 2022-08-29 RX ORDER — MONTELUKAST SODIUM 10 MG/1
TABLET ORAL
Qty: 90 TABLET | Refills: 3 | OUTPATIENT
Start: 2022-08-29

## 2022-09-07 ENCOUNTER — OFFICE VISIT (OUTPATIENT)
Dept: NEUROLOGY | Facility: CLINIC | Age: 42
End: 2022-09-07
Payer: COMMERCIAL

## 2022-09-07 VITALS
WEIGHT: 117 LBS | SYSTOLIC BLOOD PRESSURE: 120 MMHG | RESPIRATION RATE: 12 BRPM | DIASTOLIC BLOOD PRESSURE: 76 MMHG | BODY MASS INDEX: 19 KG/M2 | HEART RATE: 88 BPM

## 2022-09-07 DIAGNOSIS — R90.89 ABNORMAL FINDING ON MRI OF BRAIN: Primary | ICD-10-CM

## 2022-09-07 DIAGNOSIS — G43.009 MIGRAINE WITHOUT AURA AND WITHOUT STATUS MIGRAINOSUS, NOT INTRACTABLE: ICD-10-CM

## 2022-09-07 DIAGNOSIS — M32.9 LUPUS (HCC): ICD-10-CM

## 2022-09-07 DIAGNOSIS — R47.89 WORD FINDING DIFFICULTY: ICD-10-CM

## 2022-09-07 PROCEDURE — 99204 OFFICE O/P NEW MOD 45 MIN: CPT | Performed by: OTHER

## 2022-09-07 PROCEDURE — 3074F SYST BP LT 130 MM HG: CPT | Performed by: OTHER

## 2022-09-07 PROCEDURE — 3078F DIAST BP <80 MM HG: CPT | Performed by: OTHER

## 2022-09-15 ENCOUNTER — OFFICE VISIT (OUTPATIENT)
Dept: PHYSICAL THERAPY | Age: 42
End: 2022-09-15
Attending: FAMILY MEDICINE
Payer: COMMERCIAL

## 2022-09-15 DIAGNOSIS — M25.551 HIP PAIN, ACUTE, RIGHT: Primary | ICD-10-CM

## 2022-09-15 PROCEDURE — 97161 PT EVAL LOW COMPLEX 20 MIN: CPT

## 2022-09-15 PROCEDURE — 97110 THERAPEUTIC EXERCISES: CPT

## 2022-09-22 ENCOUNTER — TELEPHONE (OUTPATIENT)
Dept: PHYSICAL THERAPY | Facility: HOSPITAL | Age: 42
End: 2022-09-22

## 2022-09-23 ENCOUNTER — OFFICE VISIT (OUTPATIENT)
Dept: PHYSICAL THERAPY | Age: 42
End: 2022-09-23
Attending: FAMILY MEDICINE

## 2022-09-23 DIAGNOSIS — M25.551 HIP PAIN, ACUTE, RIGHT: ICD-10-CM

## 2022-09-23 PROCEDURE — 97110 THERAPEUTIC EXERCISES: CPT

## 2022-09-23 PROCEDURE — 97112 NEUROMUSCULAR REEDUCATION: CPT

## 2022-09-27 ENCOUNTER — OFFICE VISIT (OUTPATIENT)
Dept: PHYSICAL THERAPY | Age: 42
End: 2022-09-27
Attending: FAMILY MEDICINE

## 2022-09-27 DIAGNOSIS — M25.551 HIP PAIN, ACUTE, RIGHT: Primary | ICD-10-CM

## 2022-09-27 PROCEDURE — 97112 NEUROMUSCULAR REEDUCATION: CPT

## 2022-09-27 PROCEDURE — 97110 THERAPEUTIC EXERCISES: CPT

## 2022-09-30 ENCOUNTER — OFFICE VISIT (OUTPATIENT)
Dept: PHYSICAL THERAPY | Age: 42
End: 2022-09-30
Attending: FAMILY MEDICINE

## 2022-09-30 DIAGNOSIS — M25.551 HIP PAIN, ACUTE, RIGHT: Primary | ICD-10-CM

## 2022-09-30 PROCEDURE — 97110 THERAPEUTIC EXERCISES: CPT

## 2022-09-30 PROCEDURE — 97112 NEUROMUSCULAR REEDUCATION: CPT

## 2022-10-04 ENCOUNTER — APPOINTMENT (OUTPATIENT)
Dept: PHYSICAL THERAPY | Age: 42
End: 2022-10-04
Attending: FAMILY MEDICINE
Payer: COMMERCIAL

## 2022-10-06 ENCOUNTER — OFFICE VISIT (OUTPATIENT)
Dept: PHYSICAL THERAPY | Age: 42
End: 2022-10-06
Attending: FAMILY MEDICINE
Payer: COMMERCIAL

## 2022-10-06 DIAGNOSIS — M25.551 HIP PAIN, ACUTE, RIGHT: Primary | ICD-10-CM

## 2022-10-06 PROCEDURE — 97112 NEUROMUSCULAR REEDUCATION: CPT

## 2022-10-06 PROCEDURE — 97110 THERAPEUTIC EXERCISES: CPT

## 2022-10-11 ENCOUNTER — OFFICE VISIT (OUTPATIENT)
Dept: PHYSICAL THERAPY | Age: 42
End: 2022-10-11
Attending: FAMILY MEDICINE
Payer: COMMERCIAL

## 2022-10-11 DIAGNOSIS — M25.551 HIP PAIN, ACUTE, RIGHT: Primary | ICD-10-CM

## 2022-10-11 PROCEDURE — 97112 NEUROMUSCULAR REEDUCATION: CPT

## 2022-10-11 PROCEDURE — 97110 THERAPEUTIC EXERCISES: CPT

## 2022-10-18 ENCOUNTER — APPOINTMENT (OUTPATIENT)
Dept: PHYSICAL THERAPY | Age: 42
End: 2022-10-18
Attending: FAMILY MEDICINE
Payer: COMMERCIAL

## 2023-01-16 ENCOUNTER — OFFICE VISIT (OUTPATIENT)
Dept: FAMILY MEDICINE CLINIC | Facility: CLINIC | Age: 43
End: 2023-01-16
Payer: COMMERCIAL

## 2023-01-16 VITALS
BODY MASS INDEX: 21.64 KG/M2 | TEMPERATURE: 97 F | DIASTOLIC BLOOD PRESSURE: 70 MMHG | WEIGHT: 134.63 LBS | HEIGHT: 66 IN | SYSTOLIC BLOOD PRESSURE: 110 MMHG | HEART RATE: 86 BPM | OXYGEN SATURATION: 99 % | RESPIRATION RATE: 20 BRPM

## 2023-01-16 DIAGNOSIS — Z79.899 ENCOUNTER FOR LONG-TERM CURRENT USE OF MEDICATION: ICD-10-CM

## 2023-01-16 DIAGNOSIS — G43.109 MIGRAINE WITH AURA AND WITHOUT STATUS MIGRAINOSUS, NOT INTRACTABLE: ICD-10-CM

## 2023-01-16 DIAGNOSIS — J45.31 MILD PERSISTENT ASTHMA WITH EXACERBATION: Primary | ICD-10-CM

## 2023-01-16 DIAGNOSIS — Z77.22 SECOND HAND SMOKE EXPOSURE: ICD-10-CM

## 2023-01-16 PROCEDURE — 99214 OFFICE O/P EST MOD 30 MIN: CPT | Performed by: FAMILY MEDICINE

## 2023-01-16 PROCEDURE — 3074F SYST BP LT 130 MM HG: CPT | Performed by: FAMILY MEDICINE

## 2023-01-16 PROCEDURE — 3078F DIAST BP <80 MM HG: CPT | Performed by: FAMILY MEDICINE

## 2023-01-16 PROCEDURE — 3008F BODY MASS INDEX DOCD: CPT | Performed by: FAMILY MEDICINE

## 2023-01-16 RX ORDER — SUMATRIPTAN 50 MG/1
TABLET, FILM COATED ORAL
Qty: 6 TABLET | Refills: 2 | Status: SHIPPED | OUTPATIENT
Start: 2023-01-16

## 2023-01-16 RX ORDER — TOPIRAMATE 100 MG/1
100 TABLET, FILM COATED ORAL 2 TIMES DAILY
Qty: 180 TABLET | Refills: 0 | Status: SHIPPED | OUTPATIENT
Start: 2023-01-16

## 2023-01-16 NOTE — PATIENT INSTRUCTIONS
-Start using your Advair twice a day every day until you are seen for follow-up at which time additional recommendations will be made, remember to gargle with plain water after each use. If you feel like your asthma symptoms are not improving after a week or so of using the Advair, call to let Dr. Carol Stanley know so we can change it to a spray medication.

## 2023-01-30 ENCOUNTER — TELEPHONE (OUTPATIENT)
Dept: OBGYN CLINIC | Facility: CLINIC | Age: 43
End: 2023-01-30

## 2023-03-23 ENCOUNTER — HOSPITAL ENCOUNTER (OUTPATIENT)
Dept: MAMMOGRAPHY | Age: 43
Discharge: HOME | End: 2023-03-23
Attending: OBSTETRICS & GYNECOLOGY
Payer: COMMERCIAL

## 2023-03-23 DIAGNOSIS — Z12.31 ENCOUNTER FOR SCREENING MAMMOGRAM FOR BREAST CANCER: ICD-10-CM

## 2023-03-23 PROCEDURE — 77063 BREAST TOMOSYNTHESIS BI: CPT | Performed by: OBSTETRICS & GYNECOLOGY

## 2023-03-23 PROCEDURE — 77067 SCR MAMMO BI INCL CAD: CPT | Performed by: OBSTETRICS & GYNECOLOGY

## 2023-03-24 ENCOUNTER — OFFICE VISIT (OUTPATIENT)
Dept: FAMILY MEDICINE CLINIC | Facility: CLINIC | Age: 43
End: 2023-03-24
Payer: COMMERCIAL

## 2023-03-24 VITALS
HEART RATE: 83 BPM | SYSTOLIC BLOOD PRESSURE: 120 MMHG | HEIGHT: 66 IN | OXYGEN SATURATION: 99 % | BODY MASS INDEX: 20.92 KG/M2 | RESPIRATION RATE: 20 BRPM | TEMPERATURE: 98 F | DIASTOLIC BLOOD PRESSURE: 60 MMHG | WEIGHT: 130.19 LBS

## 2023-03-24 DIAGNOSIS — J45.40 MODERATE PERSISTENT ASTHMA WITHOUT COMPLICATION: Primary | ICD-10-CM

## 2023-03-24 DIAGNOSIS — G43.109 MIGRAINE WITH AURA AND WITHOUT STATUS MIGRAINOSUS, NOT INTRACTABLE: ICD-10-CM

## 2023-03-24 DIAGNOSIS — Z79.899 ENCOUNTER FOR LONG-TERM CURRENT USE OF MEDICATION: ICD-10-CM

## 2023-03-24 DIAGNOSIS — J30.2 PERENNIAL ALLERGIC RHINITIS WITH SEASONAL VARIATION: ICD-10-CM

## 2023-03-24 DIAGNOSIS — J30.89 PERENNIAL ALLERGIC RHINITIS WITH SEASONAL VARIATION: ICD-10-CM

## 2023-03-24 PROCEDURE — 3074F SYST BP LT 130 MM HG: CPT | Performed by: FAMILY MEDICINE

## 2023-03-24 PROCEDURE — 99214 OFFICE O/P EST MOD 30 MIN: CPT | Performed by: FAMILY MEDICINE

## 2023-03-24 PROCEDURE — 3008F BODY MASS INDEX DOCD: CPT | Performed by: FAMILY MEDICINE

## 2023-03-24 PROCEDURE — 3078F DIAST BP <80 MM HG: CPT | Performed by: FAMILY MEDICINE

## 2023-03-24 RX ORDER — FLUTICASONE PROPIONATE AND SALMETEROL 250; 50 UG/1; UG/1
1 POWDER RESPIRATORY (INHALATION) EVERY 12 HOURS SCHEDULED
Qty: 3 EACH | Refills: 1 | Status: SHIPPED | OUTPATIENT
Start: 2023-03-24

## 2023-03-24 RX ORDER — MONTELUKAST SODIUM 10 MG/1
10 TABLET ORAL NIGHTLY
Qty: 90 TABLET | Refills: 3 | Status: SHIPPED | OUTPATIENT
Start: 2023-03-24

## 2023-03-24 RX ORDER — TOPIRAMATE 100 MG/1
100 TABLET, FILM COATED ORAL 2 TIMES DAILY
Qty: 180 TABLET | Refills: 1 | Status: SHIPPED | OUTPATIENT
Start: 2023-03-24

## 2023-03-24 RX ORDER — ALBUTEROL SULFATE 90 UG/1
2 AEROSOL, METERED RESPIRATORY (INHALATION) EVERY 6 HOURS PRN
Qty: 1 EACH | Refills: 0 | Status: SHIPPED | OUTPATIENT
Start: 2023-03-24

## 2023-03-25 DIAGNOSIS — R92.2 DENSE BREAST TISSUE ON MAMMOGRAM: Primary | ICD-10-CM

## 2023-03-25 DIAGNOSIS — Z12.39 ENCOUNTER FOR BREAST CANCER SCREENING OTHER THAN MAMMOGRAM: ICD-10-CM

## 2023-04-17 ENCOUNTER — HOSPITAL ENCOUNTER (OUTPATIENT)
Dept: MAMMOGRAPHY | Age: 43
Discharge: HOME | End: 2023-04-17
Attending: OBSTETRICS & GYNECOLOGY
Payer: COMMERCIAL

## 2023-04-17 ENCOUNTER — HOSPITAL ENCOUNTER (OUTPATIENT)
Dept: ULTRASOUND IMAGING | Age: 43
Discharge: HOME | End: 2023-04-17
Attending: OBSTETRICS & GYNECOLOGY
Payer: COMMERCIAL

## 2023-04-17 DIAGNOSIS — R92.2 INCONCLUSIVE MAMMOGRAM: ICD-10-CM

## 2023-04-17 PROCEDURE — 77065 DX MAMMO INCL CAD UNI: CPT | Performed by: OBSTETRICS & GYNECOLOGY

## 2023-04-17 PROCEDURE — 77061 BREAST TOMOSYNTHESIS UNI: CPT | Performed by: OBSTETRICS & GYNECOLOGY

## 2023-04-17 PROCEDURE — 76642 ULTRASOUND BREAST LIMITED: CPT | Performed by: OBSTETRICS & GYNECOLOGY

## 2023-04-28 ENCOUNTER — OFFICE VISIT (OUTPATIENT)
Dept: FAMILY MEDICINE CLINIC | Facility: CLINIC | Age: 43
End: 2023-04-28
Payer: COMMERCIAL

## 2023-04-28 VITALS
OXYGEN SATURATION: 98 % | HEART RATE: 94 BPM | WEIGHT: 127.38 LBS | DIASTOLIC BLOOD PRESSURE: 64 MMHG | TEMPERATURE: 98 F | SYSTOLIC BLOOD PRESSURE: 90 MMHG | BODY MASS INDEX: 21 KG/M2

## 2023-04-28 DIAGNOSIS — R94.31 PROLONGED Q-T INTERVAL ON ECG: ICD-10-CM

## 2023-04-28 DIAGNOSIS — Z79.899 HIGH RISK MEDICATION USE: ICD-10-CM

## 2023-04-28 DIAGNOSIS — J45.41 MODERATE PERSISTENT ASTHMA WITH (ACUTE) EXACERBATION: Primary | ICD-10-CM

## 2023-04-28 PROCEDURE — 3078F DIAST BP <80 MM HG: CPT | Performed by: FAMILY MEDICINE

## 2023-04-28 PROCEDURE — 93000 ELECTROCARDIOGRAM COMPLETE: CPT | Performed by: FAMILY MEDICINE

## 2023-04-28 PROCEDURE — 3074F SYST BP LT 130 MM HG: CPT | Performed by: FAMILY MEDICINE

## 2023-04-28 PROCEDURE — 99214 OFFICE O/P EST MOD 30 MIN: CPT | Performed by: FAMILY MEDICINE

## 2023-04-28 NOTE — PATIENT INSTRUCTIONS
-Be sure to use your Advair twice a day every day and gargle with plain water after each use. -If your asthma exacerbation does not improve, please schedule appointment to see Dr. Cecelia Wilkerson for follow-up.

## 2023-04-29 LAB
ATRIAL RATE: 89 BPM
P AXIS: 11 DEGREES
P-R INTERVAL: 136 MS
Q-T INTERVAL: 384 MS
QRS DURATION: 86 MS
QTC CALCULATION (BEZET): 467 MS
R AXIS: 38 DEGREES
T AXIS: -15 DEGREES
VENTRICULAR RATE: 89 BPM

## 2023-07-24 ENCOUNTER — OFFICE VISIT (OUTPATIENT)
Dept: RHEUMATOLOGY | Facility: CLINIC | Age: 43
End: 2023-07-24
Payer: COMMERCIAL

## 2023-07-24 VITALS
WEIGHT: 125.38 LBS | SYSTOLIC BLOOD PRESSURE: 110 MMHG | TEMPERATURE: 98 F | DIASTOLIC BLOOD PRESSURE: 80 MMHG | BODY MASS INDEX: 20.15 KG/M2 | HEART RATE: 85 BPM | RESPIRATION RATE: 16 BRPM | HEIGHT: 66 IN

## 2023-07-24 DIAGNOSIS — M81.8 OTHER OSTEOPOROSIS WITHOUT CURRENT PATHOLOGICAL FRACTURE: ICD-10-CM

## 2023-07-24 DIAGNOSIS — M06.09 RHEUMATOID ARTHRITIS OF MULTIPLE SITES WITHOUT RHEUMATOID FACTOR (HCC): Primary | ICD-10-CM

## 2023-07-24 DIAGNOSIS — M79.7 FIBROMYALGIA: ICD-10-CM

## 2023-07-24 DIAGNOSIS — M19.90 OSTEOARTHRITIS, UNSPECIFIED OSTEOARTHRITIS TYPE, UNSPECIFIED SITE: ICD-10-CM

## 2023-07-24 DIAGNOSIS — M32.9 LUPUS (HCC): ICD-10-CM

## 2023-07-24 RX ORDER — GABAPENTIN 300 MG/1
600 CAPSULE ORAL NIGHTLY
Qty: 60 CAPSULE | Refills: 5 | Status: SHIPPED | OUTPATIENT
Start: 2023-07-24

## 2023-07-24 RX ORDER — METHYLPREDNISOLONE 4 MG/1
TABLET ORAL
Qty: 1 EACH | Refills: 0 | Status: SHIPPED | OUTPATIENT
Start: 2023-07-24

## 2023-07-24 RX ORDER — HYDROXYCHLOROQUINE SULFATE 200 MG/1
400 TABLET, FILM COATED ORAL DAILY
Qty: 60 TABLET | Refills: 3 | Status: SHIPPED | OUTPATIENT
Start: 2023-07-24

## 2023-07-24 NOTE — PROGRESS NOTES
[facility],  [address] [facility]     Progress Note     Luh Shields Patient Status:  No patient class for patient encounter    1980 MRN [id number]   Location [facility], [address] [facility], [address] Attending No att. providers found   Hosp Day # 0 PCP Kathy Payne DO     Chief Complaint:     Subjective:   S:     45-year-old woman comes in for reevaluation for lupus and fibromyalgia    She was last seen in clinic May 2023  She has been stable on methotrexate 20 mg subcu week and hydroxychloroquine 400 mg daily  No major flareups  Recently diagnosed with ADHD along with depression and anxiety and is going to be started on Vyvanse when she gets cardiac clearance  States no shortness of breath chest pain fevers or chills  Does have mild flareup of her asthma and mild swelling and stiffness in her feet since she has been taking her methotrexate injections every other week because of methotrexate injection shortage  She states she would like to continue this route until next month when she is due again if they are still at a shortage she may switch to oral methotrexate and she will let us know  New standing labs given to the patient. Has some weight loss but she states this is more related to her depression and she is working on it with a psychiatrist  States no rashes oral nasal ulcers  No night sweats. She has no concerns at this time.   Recent labs May 2023 normal      Previously in 2022  She had continued persistent hearing loss but stable  She had seen ENT who stated some stability but some worsening  They had recommended seeing a neurologist again which she has made an appointment at [facility] with a new neurologist in 2022  She was told nothing concerning based on MRI and maybe yearly MRI of the brain  Autoimmune testing repeated in 2022 was normal including double-stranded DNA and complements  She continued methotrexate along with hydroxychloroquine  States no rashes no shortness of breath that is worse. Her depression and anxiety are fairly stable. States no weakness no swelling of her joints  Or new worsening pain. States no rashes oral or nasal ulcers  Her weight has stabilized    Has longstanding history of hip impingement issues that come and go and does exercises on her own. She has a grandson who is 2 months old which she is very active bleeding involved with an quite excited about. Previously hearing loss had started November 2021  She had extensive workup and seen ENT and neurology subsequently  She had MRI that was abnormal and unclear for lesions but then further workup revealed no indication of multiple sclerosis lumbar puncture also was unrevealing  Since her double-strand DNA and complements were low. Suspected considering possibility of lupus related CNS disease  She was placed on steroids along with CellCept for at least 6-8 weeks  She cannot tolerate the CellCept after 4-6 weeks and stopped because of significant side effects  She has remained off steroids  She has some residual pulmonary issues but improving overall  She is gotten a chest x-ray which was normal  Her PCP thinks it is related to her allergies and she is on inhalers with improvement  We were previously considering Benlysta as an additional medication if her lupus testing is concerning    I discussed possibility of CNS related lupus with testing being abnormal which could possibly explain some of her manifestations but not clearly    She verbalizes understanding and agrees with this plan    She was continued on methotrexate was 20 mg subcutaneous every week    Previous MRI which showed below findings    IMPRESSION:    1. Normal internal auditory canals bilaterally. 2. 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   3. A few small areas of T2 signal prolongation on FLAIR imaging perpendicularly oriented to the left  lateral ventricle in the region of the DVA, likely representing adjacent minimal gliosis. Delevan Mura FINDINGS: The internal auditory canals are symmetric. No abnormal enhancement following contrast  administration. The perimesencephalic and cerebellopontine angle cisterns are normal. FLAIR images demonstrate minimal periventricular white matter T2 enhancement perpendicular oriented to the left lateral ventricle. Otherwise, no abnormal signal. There is no restricted diffusion. Whole brain contrast enhanced images demonstrate no abnormal enhancement except for the small developmental venous anomaly in the left corona radiata. This is also near the area of the T2 signal prolongation near the lateral ventricle on the left, and likely accounts for that finding. Demyelination from multiple sclerosis is felt to be less likely, although can be seen with this type of white matterchange, since the patient does not carry a diagnosis of multiple sclerosis, and no similar findings  are seen throughout the remainder of the brain. This needs clinical correlation and follow-up. She states her neurologist thinks that the cognitive issues could be related to Topamax but she would like to remain on this since this controlled her migraines overall  Prior to that She had malar rash, and polyarthritis and positive double strand DNA and low complements suggestive of active disease    This has resolved or joint swelling and stiffness and also some of her rash on her face    Her double strand DNA had improved     She states her side effects have improved but she has reflux from stress at work and thinks she has an ulcer. She has been doing over-the-counter reflux medications. She has not made appointment with GI yet. Her weight has stabilized    Previously CT of the chest showed some mild lung collapse but no infiltrates.   CT abdomen and pelvis showed some gastric wall thickening but no acute findings    She does not feel like the weight loss is from her lupus and more her stress. Her last imaging was May 2021 and showed multiple pulmonary nodules. She has been started on oral inhalers with improvement. Chest x-ray was normal prior to starting methotrexate. She states she is not planning on getting pregnant and understands the risk of doing so and she does not drink alcohol. Risks and side effects discussed in detail including possibility of cytopenias kidney liver involvement. She has remained on gabapentin 600 mg daily, hydroxychloroquine 400 mg daily. Her eye exam was done By another ophthalmologist October 2021 and negative for toxicity    She states no vision changes or side effects otherwise    She  has multiple external allergies and is followed closely by her primary care physician with occasional wheezing and recently added Singulair albuterol and she does take over-the-counter Flonase as well. She periodically has some stressors with her 22-year-old daughter quitting school and moving to [place] with her boyfriend. Her daughter was diagnosed with high functioning autism and Tourette's syndrome. Overall she is trying to cope with this. She does have some mild nonrestorative sleep and fatigue. Denies any suicidal ideation or active depression. She has general achiness overall. Denies any photosensitive malar rash no swelling of her joints no oral or nasal ulcers. Her weight has been stable. No night sweats fevers or chills Her pain levels are minimal and not concerning for her. Review of Systems:     Constitutional: Negative for activity change, chills, diaphoresis, fatigue, fever and unexpected weight change. HENT: Negative for congestion, hearing loss and mouth sores. Eyes:  Negative for pain, redness and visual disturbance.     Respiratory: Negative for apnea, cough, chest tightness, occasional shortness of breath, occasional wheezing Cardiovascular: Negative for chest pain, palpitations and leg swelling. Gastrointestinal: Negative for abdominal distention, abdominal pain, blood in stool, constipation, diarrhea and nausea. Endo: Negative. Genitourinary:  Negative for decreased urine volume, difficulty urinating, dysuria, and frequency. Musculoskeletal: Occasional arthralgias, no gait problem and joint swelling. Skin: Negative. Negative for color change, pallor and rash. No raynauds or digital ulcerations. Allergic/Immunologic: Negative. Neurological: Negative. Negative for dizziness, tremors, seizures, syncope,  speech difficulty, weakness, light-headedness, numbness and headaches. Hematological:  Does not bruise/bleed easily. Psychiatric/Behavioral: Negative depression, confusion, decreased concentration, self-injury, sleep disturbance and suicidal ideas. The patient is not nervous/anxious. Objective:   Vital signs:  [unfilled]    Wt Readings from Last 3 Encounters:  07/24/23 : 125 lb 6.4 oz (56.9 kg)  04/28/23 : 127 lb 6.4 oz (57.8 kg)  03/24/23 : 130 lb 3.2 oz (59.1 kg)      Intake/Output:  [unfilled]    [unfilled]  Physical Exam:      Constitutional: Is oriented to person, place, and time. No distress. HEENT: Normocephalic, no jvd; no lad; EOMI; good oral pooling; no oral or nasal ulcers. Eyes: Conjunctivae and EOM are normal. Pupils are equal, round, and reactive to light. Neck: Normal range of motion. No thyromegaly present. Cardiovascular: RRR, no murmurs. Lungs: Clear, Bilateral air entry, + wheezes. Abdominal: Soft. Nontender; nondistended; BS+. Musculoskeletal:       Right shoulder:  Exhibits normal range of motion on abduction and internal rotation, no tenderness, no bony tenderness, no deformity, no laceration, no pain and no spasm. Left shoulder: Exhibits normal range of motion on abduction and internal and external rotation.   no tenderness, no bony tenderness, no swelling, no effusion, no deformity, no pain, no spasm and normal strength. Right elbow: Exhibits normal range of motion, no swelling, no effusion and no deformity. No tenderness found. No medial epicondyle, no lateral epicondyle and no olecranon process tenderness noted. There are no contractures or tophi or nodules. Left elbow: Exhibits normal range of motion, no swelling, no effusion and no deformity. No tenderness found. No medial epicondyle, no lateral epicondyle and no olecranon process tenderness noted. There are no contractures or tophi or nodules       Right wrist: Exhibits normal range of motion, no tenderness, no bony tenderness, no swelling, no effusion and no crepitus. Flexion and extension intact without limitation. Left wrist:  Exhibits normal range of motion, no tenderness, no bony tenderness, no swelling, no effusion, no crepitus and no deformity. Flexion and extension intact without limitation. Right hip: Exhibits normal range of motion, normal strength, no tenderness, no bony tenderness, no swelling and no crepitus. Right hand: No synovitis of MCP,PIP or DIP joints; no Bouchards or Heberden nodules noted;  strength: 100%. Left hand: No synovitis of MCP,PIP or DIP joints; no Bouchards or Heberden nodules noted;  strength: 100%. Left hip: Exhibits normal range of motion, normal strength, no tenderness, no bony tenderness, no swelling and no crepitus. Right Hip: Normal without LROM or TTP       Right knee: Exhibits normal range of motion, no swelling, no effusion, no ecchymosis, no deformity and no erythema. No tenderness found. No medial joint line, no lateral joint line, no MCL and no LCL tenderness noted. No crepitation found on flexion and extension normal.       Left knee: Exhibits normal range of motion, no swelling, no effusion, no ecchymosis and no erythema. No tenderness found.  No medial joint line, no lateral joint line and no patellar tendon tenderness noted. No crepitation found on flexion and flexion intact. Right ankle: Exhibits normal range of motion, no swelling, no deformity and no laceration. No tenderness. No lateral malleolus and no medial malleolus tenderness found. Achilles tendon normal. Achilles tendon exhibits no pain, no defect and normal White's test results. Left ankle: Exhibits normal range of motion, no swelling, and no deformity. No tenderness. No lateral malleolus and no medial malleolus tenderness found. Cervical back: Exhibits normal range of motion, no tenderness, no bony tenderness, no swelling, no pain and no spasm. Thoracic back: Exhibits normal range of motion, no tenderness, no bony tenderness and no spasm. Lumbar back: Exhibits normal range of motion, no tenderness, no bony tenderness, no pain and no spasm. Right foot: No tenderness, no bony tenderness, no crepitus and no laceration. There is no synovitis or tenderness of the MTP joints to palpation. Mild soft tissue swelling MTP joints       Left foot: No tenderness, no bony tenderness and no crepitus. There is no synovitis or tenderness of the MTP joints to palpation. Mild soft tissue swelling MTP joints    Lymphadenopathy: No noted lympadenopathy. Neurological: Is alert and oriented. No focal motor or sensory abnormalities. Skin: Skin is warm, dry and intact. Psychiatric: Normal behavior    Results:   Diagnostic Data:      Labs:    [unfilled]    Imaging:   No results found.      Reviewed CBC CMP complement C3-C4 double-stranded DNA normal May 2023    Medications:     Assessment & Plan:   ASSESSMENT / PLAN:     45-year-old woman comes in for reevaluation for:    Systemic lupus without renal manifestations  Mild generalized osteoarthritis  Fibromyalgia  Depression  History of vitamin D deficiency  Reactive airway disease likely related to asthma and allergies  And residual coronavirus related lung disease (see chest x-ray normal)  Abnormal T2 restricted changes concerns for demyelination on MRI of the brain with new onset right hearing loss for the last 4 weeks followed by ENT  Unclear etiology could consider CNS lupus related changes  Suspect right hip impingement syndrome followed by PCP/status post physical therapy with improvement    Patient has no obvious synovitis on exam    Has mild flareup of asthma. Patient requesting Medrol Dosepak    Is taking injectable methotrexate every other week with mild stiffness because there is a shortage. She is due for labs next month. New standing labs given to patient. She will let us know if she needs to switch to oral methotrexate if there is still shortage. She has 1 more month left    CT abdomen and pelvis chest reviewed with patient. Follow up with gastroenterology for EGD    Advised to consider Prilosec 40 mg daily    Previously had given her information to gastroenterology to likely proceed with EGD as the next step    Continue methotrexate to 20 mg subcutaneous every week and continue folic acid    Continue hydroxychloroquine 400 mg daily. Eye exam normal October 2022    Patient states she has an eye exam for August 2023    Recent labs March 2023 normal    Update labs today and give new standing labs to be done every 3 months    She states she is going to another retinal specialist actually make sure there is no issues    Lumbar puncture unrevealing for multiple sclerosis    Per patient neurologist does not think she has multiple sclerosis    She is getting a second opinion from neurologist at [facility] which I'm agreeable with. She likely needs another imaging of her brain. States the other neurologist wanted to wait.  She does feel stable enough to wait September appointment nothing has worsened clinically from her perspective    She cannot tolerate CellCept which was stopped about 6 months ago    We will repeat double-strand DNA recent CBC CMP complement stable September 2022    Consider Mayra Tolentino in the future if her double-strand DNA is abnormal to treat possibility of CNS related manifestations of lupus    Her inflammatory arthritis is finally under control. Her weight has also stabilized. Agree with Singulair and albuterol when necessary; This is likely related to her allergies and viral syndrome. There have been cases of ankle associated vasculitis with Singulair    Chest x-ray normal Jan 2022    She should get pulmonary function test through her primary care physician    Depression is stable on Zoloft 100 mg daily and trazodone 50 mg at bedtime now followed by psychiatry. She is on 2 additional medications which have improved her symptoms    Would like to obtain the psychiatry note    Continue gabapentin 300 mg twice a day    Continue to encourage low intensity exercise and stress management    discussed importance of stress management. And on vitamin D levels today    Follow-up with PCP for migraine headaches. If this is related to lupus it will improve with prednisone and methotrexate. Otherwise symptomatic control through PCP        Education and counseling provided to patient on medications and diagnosis. Instructed patient to call my office or seek medical attention immediately if symptoms worsen. Return to clinic:  Return in about 6 months (around 1/24/2024).     Alea Hsieh MD

## 2023-08-16 ENCOUNTER — OFFICE VISIT (OUTPATIENT)
Dept: FAMILY MEDICINE CLINIC | Facility: CLINIC | Age: 43
End: 2023-08-16
Payer: COMMERCIAL

## 2023-08-16 VITALS
OXYGEN SATURATION: 99 % | SYSTOLIC BLOOD PRESSURE: 94 MMHG | DIASTOLIC BLOOD PRESSURE: 70 MMHG | TEMPERATURE: 98 F | WEIGHT: 123.19 LBS | HEART RATE: 95 BPM | HEIGHT: 67 IN | BODY MASS INDEX: 19.34 KG/M2

## 2023-08-16 DIAGNOSIS — J45.41 MODERATE PERSISTENT ASTHMA WITH (ACUTE) EXACERBATION: ICD-10-CM

## 2023-08-16 DIAGNOSIS — Z23 NEED FOR VACCINATION: ICD-10-CM

## 2023-08-16 DIAGNOSIS — R10.32 ABDOMINAL PAIN, BILATERAL LOWER QUADRANT: ICD-10-CM

## 2023-08-16 DIAGNOSIS — G43.109 MIGRAINE WITH AURA AND WITHOUT STATUS MIGRAINOSUS, NOT INTRACTABLE: ICD-10-CM

## 2023-08-16 DIAGNOSIS — R10.31 ABDOMINAL PAIN, BILATERAL LOWER QUADRANT: ICD-10-CM

## 2023-08-16 DIAGNOSIS — R21 RASH AND NONSPECIFIC SKIN ERUPTION: ICD-10-CM

## 2023-08-16 DIAGNOSIS — Z79.899 ENCOUNTER FOR LONG-TERM CURRENT USE OF MEDICATION: ICD-10-CM

## 2023-08-16 DIAGNOSIS — Z00.00 ROUTINE GENERAL MEDICAL EXAMINATION AT A HEALTH CARE FACILITY: Primary | ICD-10-CM

## 2023-08-16 DIAGNOSIS — R94.31 LONG QT INTERVAL: ICD-10-CM

## 2023-08-16 PROCEDURE — 90677 PCV20 VACCINE IM: CPT | Performed by: FAMILY MEDICINE

## 2023-08-16 PROCEDURE — 90471 IMMUNIZATION ADMIN: CPT | Performed by: FAMILY MEDICINE

## 2023-08-16 PROCEDURE — 3008F BODY MASS INDEX DOCD: CPT | Performed by: FAMILY MEDICINE

## 2023-08-16 PROCEDURE — 3078F DIAST BP <80 MM HG: CPT | Performed by: FAMILY MEDICINE

## 2023-08-16 PROCEDURE — 99214 OFFICE O/P EST MOD 30 MIN: CPT | Performed by: FAMILY MEDICINE

## 2023-08-16 PROCEDURE — 99396 PREV VISIT EST AGE 40-64: CPT | Performed by: FAMILY MEDICINE

## 2023-08-16 PROCEDURE — 3074F SYST BP LT 130 MM HG: CPT | Performed by: FAMILY MEDICINE

## 2023-08-16 RX ORDER — TOPIRAMATE 100 MG/1
100 TABLET, FILM COATED ORAL 2 TIMES DAILY
Qty: 180 TABLET | Refills: 1 | Status: SHIPPED | OUTPATIENT
Start: 2023-08-16

## 2023-09-02 DIAGNOSIS — G43.109 MIGRAINE WITH AURA AND WITHOUT STATUS MIGRAINOSUS, NOT INTRACTABLE: ICD-10-CM

## 2023-09-02 DIAGNOSIS — Z79.899 ENCOUNTER FOR LONG-TERM CURRENT USE OF MEDICATION: ICD-10-CM

## 2023-09-05 RX ORDER — SUMATRIPTAN 50 MG/1
TABLET, FILM COATED ORAL
Qty: 6 TABLET | Refills: 2 | Status: SHIPPED | OUTPATIENT
Start: 2023-09-05

## 2023-09-30 ENCOUNTER — LAB ENCOUNTER (OUTPATIENT)
Dept: LAB | Age: 43
End: 2023-09-30
Attending: FAMILY MEDICINE
Payer: COMMERCIAL

## 2023-09-30 DIAGNOSIS — M81.8 OTHER OSTEOPOROSIS WITHOUT CURRENT PATHOLOGICAL FRACTURE: ICD-10-CM

## 2023-09-30 DIAGNOSIS — Z00.00 ROUTINE GENERAL MEDICAL EXAMINATION AT A HEALTH CARE FACILITY: ICD-10-CM

## 2023-09-30 DIAGNOSIS — M06.09 RHEUMATOID ARTHRITIS OF MULTIPLE SITES WITHOUT RHEUMATOID FACTOR (HCC): ICD-10-CM

## 2023-09-30 DIAGNOSIS — M19.90 OSTEOARTHRITIS, UNSPECIFIED OSTEOARTHRITIS TYPE, UNSPECIFIED SITE: ICD-10-CM

## 2023-09-30 LAB
ALBUMIN SERPL-MCNC: 3.8 G/DL (ref 3.4–5)
ALBUMIN/GLOB SERPL: 1.5 {RATIO} (ref 1–2)
ALP LIVER SERPL-CCNC: 67 U/L
ALT SERPL-CCNC: 14 U/L
ANION GAP SERPL CALC-SCNC: 4 MMOL/L (ref 0–18)
AST SERPL-CCNC: 14 U/L (ref 15–37)
BASOPHILS # BLD AUTO: 0.03 X10(3) UL (ref 0–0.2)
BASOPHILS NFR BLD AUTO: 0.3 %
BILIRUB SERPL-MCNC: 0.3 MG/DL (ref 0.1–2)
BUN BLD-MCNC: 17 MG/DL (ref 7–18)
CALCIUM BLD-MCNC: 9.1 MG/DL (ref 8.5–10.1)
CHLORIDE SERPL-SCNC: 109 MMOL/L (ref 98–112)
CHOLEST SERPL-MCNC: 139 MG/DL (ref ?–200)
CO2 SERPL-SCNC: 25 MMOL/L (ref 21–32)
CREAT BLD-MCNC: 0.63 MG/DL
EGFRCR SERPLBLD CKD-EPI 2021: 113 ML/MIN/1.73M2 (ref 60–?)
EOSINOPHIL # BLD AUTO: 0.28 X10(3) UL (ref 0–0.7)
EOSINOPHIL NFR BLD AUTO: 3.2 %
ERYTHROCYTE [DISTWIDTH] IN BLOOD BY AUTOMATED COUNT: 15.1 %
ERYTHROCYTE [SEDIMENTATION RATE] IN BLOOD: 1 MM/HR
FASTING PATIENT LIPID ANSWER: YES
FASTING STATUS PATIENT QL REPORTED: YES
GLOBULIN PLAS-MCNC: 2.6 G/DL (ref 2.8–4.4)
GLUCOSE BLD-MCNC: 99 MG/DL (ref 70–99)
HCT VFR BLD AUTO: 41 %
HDLC SERPL-MCNC: 63 MG/DL (ref 40–59)
HGB BLD-MCNC: 13.2 G/DL
IMM GRANULOCYTES # BLD AUTO: 0.03 X10(3) UL (ref 0–1)
IMM GRANULOCYTES NFR BLD: 0.3 %
LDLC SERPL CALC-MCNC: 64 MG/DL (ref ?–100)
LYMPHOCYTES # BLD AUTO: 2.37 X10(3) UL (ref 1–4)
LYMPHOCYTES NFR BLD AUTO: 26.7 %
MCH RBC QN AUTO: 32.8 PG (ref 26–34)
MCHC RBC AUTO-ENTMCNC: 32.2 G/DL (ref 31–37)
MCV RBC AUTO: 102 FL
MONOCYTES # BLD AUTO: 0.58 X10(3) UL (ref 0.1–1)
MONOCYTES NFR BLD AUTO: 6.5 %
NEUTROPHILS # BLD AUTO: 5.59 X10 (3) UL (ref 1.5–7.7)
NEUTROPHILS # BLD AUTO: 5.59 X10(3) UL (ref 1.5–7.7)
NEUTROPHILS NFR BLD AUTO: 63 %
NONHDLC SERPL-MCNC: 76 MG/DL (ref ?–130)
OSMOLALITY SERPL CALC.SUM OF ELEC: 288 MOSM/KG (ref 275–295)
PLATELET # BLD AUTO: 245 10(3)UL (ref 150–450)
POTASSIUM SERPL-SCNC: 3.7 MMOL/L (ref 3.5–5.1)
PROT SERPL-MCNC: 6.4 G/DL (ref 6.4–8.2)
RBC # BLD AUTO: 4.02 X10(6)UL
SODIUM SERPL-SCNC: 138 MMOL/L (ref 136–145)
T4 FREE SERPL-MCNC: 0.8 NG/DL (ref 0.8–1.7)
TRIGL SERPL-MCNC: 57 MG/DL (ref 30–149)
TSI SER-ACNC: 1.39 MIU/ML (ref 0.36–3.74)
VLDLC SERPL CALC-MCNC: 8 MG/DL (ref 0–30)
WBC # BLD AUTO: 8.9 X10(3) UL (ref 4–11)

## 2023-09-30 PROCEDURE — 36415 COLL VENOUS BLD VENIPUNCTURE: CPT

## 2023-09-30 PROCEDURE — 86225 DNA ANTIBODY NATIVE: CPT

## 2023-09-30 PROCEDURE — 84439 ASSAY OF FREE THYROXINE: CPT

## 2023-09-30 PROCEDURE — 80061 LIPID PANEL: CPT

## 2023-09-30 PROCEDURE — 85652 RBC SED RATE AUTOMATED: CPT

## 2023-09-30 PROCEDURE — 80053 COMPREHEN METABOLIC PANEL: CPT

## 2023-09-30 PROCEDURE — 84443 ASSAY THYROID STIM HORMONE: CPT

## 2023-09-30 PROCEDURE — 85025 COMPLETE CBC W/AUTO DIFF WBC: CPT

## 2023-10-02 LAB — DSDNA IGG SERPL IA-ACNC: 1.7 IU/ML

## 2023-10-12 RX ORDER — METHOTREXATE 25 MG/ML
INJECTION INTRA-ARTERIAL; INTRAMUSCULAR; INTRATHECAL; INTRAVENOUS
Qty: 1.12 ML | Refills: 0 | Status: CANCELLED | OUTPATIENT
Start: 2023-10-12

## 2023-10-13 NOTE — TELEPHONE ENCOUNTER
Previous medication refills managed by Dr Blanton.  Refill request forwarded from [id number] to Dr Blanton.    Medication(s) to Refill:   Requested Prescriptions     Pending Prescriptions Disp Refills    Methotrexate Sodium, PF, 50 MG/2ML Injection Solution 1.12 mL 0         Patient Comment: The injectable is still on backorder. Can you please send in a script for the pills and the injectable. My pharmacy will fill the pills now and as long as they have the order for the injectable they will continue to try to order it for me and hopefully it will come in again.

## 2023-10-20 ENCOUNTER — PATIENT MESSAGE (OUTPATIENT)
Dept: FAMILY MEDICINE CLINIC | Facility: CLINIC | Age: 43
End: 2023-10-20

## 2023-10-20 ENCOUNTER — TELEPHONE (OUTPATIENT)
Dept: FAMILY MEDICINE CLINIC | Facility: CLINIC | Age: 43
End: 2023-10-20

## 2023-10-20 NOTE — TELEPHONE ENCOUNTER
PSRs,  Does this request go to the forms dept?  It looks like she is requesting [place] paperwork.  Her last appt w/Dr Schumacher was 8/16/23, does she need another?      From: Karol Packer  To: Leni Schumacher  Sent: 10/20/2023  5:33 AM CDT  Subject: Work from home medical question    I need your assistance with paperwork for medical accommodations to continue to work from home beginning 01/02/24. I am filing a ADA claim and need to make an appointment to come in and talk with you and have the papers filled out by 11/17/23, I want to talk to you as this is a unique and weird situation of course lol. I’m not sure what type of appointment to set up, so if you could let me know I would appreciate it.    Thank you!  Tucker Packer

## 2023-10-24 NOTE — TELEPHONE ENCOUNTER
Please have patient make an appointment to discuss and if indicated we will forward the forms to the forms department.

## 2023-10-25 ENCOUNTER — PATIENT MESSAGE (OUTPATIENT)
Dept: RHEUMATOLOGY | Facility: CLINIC | Age: 43
End: 2023-10-25

## 2023-10-25 RX ORDER — METHOTREXATE 25 MG/ML
20 INJECTION, SOLUTION INTRA-ARTERIAL; INTRAMUSCULAR; INTRAVENOUS WEEKLY
Qty: 4 EACH | Refills: 0 | Status: SHIPPED | OUTPATIENT
Start: 2023-10-25 | End: 2023-11-24

## 2023-10-25 RX ORDER — NEEDLES, SAFETY 22GX1 1/2"
NEEDLE, DISPOSABLE MISCELLANEOUS
Qty: 16 EACH | Refills: 0 | Status: SHIPPED | OUTPATIENT
Start: 2023-10-25

## 2023-10-25 RX ORDER — METHOTREXATE 2.5 MG/1
20 TABLET ORAL WEEKLY
Qty: 32 TABLET | Refills: 0 | Status: SHIPPED | OUTPATIENT
Start: 2023-10-25 | End: 2023-11-24

## 2023-10-25 NOTE — TELEPHONE ENCOUNTER
Irasema, this patient is asking for the pill form to be sent to her pharmacy as well because the liquid is on back order. She states that the pharmacy will continue to try and order the liquid until they get it but she wants tablet form in the meantime as to not miss a dose again like she did last week. Can you help me convert the liquid to pills please?

## 2023-10-25 NOTE — TELEPHONE ENCOUNTER
From: Brooklyn Leon  To: Usman Hicks  Sent: 10/25/2023 6:22 AM CDT  Subject: Methotrexate     I forgot to include in my request for the liquid version I do need the syringes as well. I was at the pharmacy yesterday and they do have the liquid in stock so if the script is sent today they maybe able to fill it.     Thank you,  Piotr Zuniga

## 2023-11-06 ENCOUNTER — OFFICE VISIT (OUTPATIENT)
Dept: FAMILY MEDICINE CLINIC | Facility: CLINIC | Age: 43
End: 2023-11-06
Payer: COMMERCIAL

## 2023-11-06 VITALS
SYSTOLIC BLOOD PRESSURE: 104 MMHG | TEMPERATURE: 98 F | DIASTOLIC BLOOD PRESSURE: 70 MMHG | HEART RATE: 104 BPM | BODY MASS INDEX: 19.75 KG/M2 | OXYGEN SATURATION: 97 % | WEIGHT: 125.81 LBS | HEIGHT: 67 IN

## 2023-11-06 DIAGNOSIS — F41.1 GAD (GENERALIZED ANXIETY DISORDER): ICD-10-CM

## 2023-11-06 DIAGNOSIS — M32.9 LUPUS (HCC): ICD-10-CM

## 2023-11-06 DIAGNOSIS — G43.109 MIGRAINE WITH AURA AND WITHOUT STATUS MIGRAINOSUS, NOT INTRACTABLE: ICD-10-CM

## 2023-11-06 DIAGNOSIS — F39 MOOD DISORDER (HCC): ICD-10-CM

## 2023-11-06 DIAGNOSIS — Z79.899 ENCOUNTER FOR LONG-TERM CURRENT USE OF MEDICATION: ICD-10-CM

## 2023-11-06 DIAGNOSIS — J45.40 MODERATE PERSISTENT ASTHMA WITHOUT COMPLICATION: Primary | ICD-10-CM

## 2023-11-06 PROCEDURE — 3008F BODY MASS INDEX DOCD: CPT | Performed by: FAMILY MEDICINE

## 2023-11-06 PROCEDURE — 3078F DIAST BP <80 MM HG: CPT | Performed by: FAMILY MEDICINE

## 2023-11-06 PROCEDURE — 3074F SYST BP LT 130 MM HG: CPT | Performed by: FAMILY MEDICINE

## 2023-11-06 PROCEDURE — 99214 OFFICE O/P EST MOD 30 MIN: CPT | Performed by: FAMILY MEDICINE

## 2023-11-06 RX ORDER — FLUTICASONE PROPIONATE AND SALMETEROL 250; 50 UG/1; UG/1
1 POWDER RESPIRATORY (INHALATION) EVERY 12 HOURS SCHEDULED
Qty: 3 EACH | Refills: 1 | Status: SHIPPED | OUTPATIENT
Start: 2023-11-06

## 2023-11-07 ENCOUNTER — TELEPHONE (OUTPATIENT)
Dept: FAMILY MEDICINE CLINIC | Facility: CLINIC | Age: 43
End: 2023-11-07

## 2023-11-07 NOTE — TELEPHONE ENCOUNTER
Received fax requesting ADA form to be completed.     Date Received: 11/06/2023  Received By (Walk-in/Fax/Mail): Fax  Form Name: ADA  CLIVE Completed (Yes/N/A): No  Request Form Completed (Yes/N/A): No  Location to Pick-Up Form/Faxed#:   Need Completed by Date:  Payment Received (Yes/No): No    Form emailed to forms dept, original form sent through inner office mail to forms dept. Copy made and placed in  forms bin.   Informed patient may take up to 5 - 7 business days for form completion.

## 2023-11-09 NOTE — OPERATIVE REPORT
Geronimoflorence Yanira Patient Status:  [facility]    1980 MRN [id number]   Location [id number] [place] [address] Attending Bobbi Jimenez DO   Hosp Day # 0 PCP Francisco Cross DO       PREOPERATIVE DIAGNOSIS/JOSETTE [facility]

## 2023-11-22 RX ORDER — METHOTREXATE 25 MG/ML
20 INJECTION, SOLUTION INTRA-ARTERIAL; INTRAMUSCULAR; INTRAVENOUS WEEKLY
Qty: 4 EACH | Refills: 0 | Status: SHIPPED | OUTPATIENT
Start: 2023-11-22 | End: 2023-12-22

## 2023-11-22 RX ORDER — NEEDLES, SAFETY 22GX1 1/2"
NEEDLE, DISPOSABLE MISCELLANEOUS
Qty: 16 EACH | Refills: 0 | Status: SHIPPED | OUTPATIENT
Start: 2023-11-22

## 2023-11-22 NOTE — TELEPHONE ENCOUNTER
Methotrexate solution 50 mg/2ml  20 mg injectred per week. Refill approved  [facility]  Dr. Jared Caldwell covering Dr Mesfin Oates.

## 2023-11-22 NOTE — TELEPHONE ENCOUNTER
LOV: 07/24/2023  Future Appointments   Date Time Provider Rob Benjamin   2/5/2024  1:20 PM Fox Blanton MD [facility] [address]   LF:   10/25/2023    QTY:   4 each     Refills:   0

## 2023-11-22 NOTE — TELEPHONE ENCOUNTER
LOV: 07/24/2023  Future Appointments   Date Time Provider Rob Nickersoni   2/5/2024  1:20 PM Olaf Blanton MD [facility] [address]   LF: 10/25/2023      QTY:   16 each     Refills:   0

## 2023-11-30 ENCOUNTER — TELEPHONE (OUTPATIENT)
Dept: FAMILY MEDICINE CLINIC | Facility: CLINIC | Age: 43
End: 2023-11-30

## 2023-11-30 RX ORDER — AZITHROMYCIN 250 MG/1
TABLET, FILM COATED ORAL
Qty: 6 TABLET | Refills: 0 | Status: SHIPPED | OUTPATIENT
Start: 2023-11-30 | End: 2023-12-04

## 2023-12-15 ENCOUNTER — OFFICE VISIT (OUTPATIENT)
Dept: FAMILY MEDICINE CLINIC | Facility: CLINIC | Age: 43
End: 2023-12-15
Payer: COMMERCIAL

## 2023-12-15 VITALS
OXYGEN SATURATION: 98 % | DIASTOLIC BLOOD PRESSURE: 80 MMHG | RESPIRATION RATE: 16 BRPM | HEART RATE: 97 BPM | SYSTOLIC BLOOD PRESSURE: 116 MMHG | HEIGHT: 67 IN | WEIGHT: 120 LBS | BODY MASS INDEX: 18.83 KG/M2 | TEMPERATURE: 98 F

## 2023-12-15 DIAGNOSIS — R06.2 WHEEZING: ICD-10-CM

## 2023-12-15 DIAGNOSIS — J01.40 ACUTE NON-RECURRENT PANSINUSITIS: Primary | ICD-10-CM

## 2023-12-15 RX ORDER — METHYLPREDNISOLONE 4 MG/1
TABLET ORAL
Qty: 1 EACH | Refills: 0 | Status: SHIPPED | OUTPATIENT
Start: 2023-12-15

## 2023-12-15 RX ORDER — AZITHROMYCIN 250 MG/1
TABLET, FILM COATED ORAL
Qty: 6 TABLET | Refills: 0 | Status: SHIPPED | OUTPATIENT
Start: 2023-12-15 | End: 2023-12-20

## 2023-12-27 NOTE — TELEPHONE ENCOUNTER
Requested Prescriptions     Pending Prescriptions Disp Refills    Methotrexate Sodium, PF, 50 MG/2ML Injection Solution 1.12 mL 0     Last refill: Pt. Reported-Needs Details    Last Appointment: 11/6/23    Next Appointment: No future OV's scheduled

## 2023-12-28 PROBLEM — J20.8 ACUTE BRONCHITIS DUE TO SEVERE ACUTE RESPIRATORY SYNDROME CORONAVIRUS 2 (SARS-COV-2): Status: RESOLVED | Noted: 2022-01-25 | Resolved: 2023-12-28

## 2023-12-28 PROBLEM — J45.31 MILD PERSISTENT ASTHMA WITH ACUTE EXACERBATION: Status: RESOLVED | Noted: 2022-08-11 | Resolved: 2023-12-28

## 2023-12-28 PROBLEM — Z90.79 STATUS POST BILATERAL SALPINGECTOMY: Status: RESOLVED | Noted: 2020-12-28 | Resolved: 2023-12-28

## 2023-12-28 PROBLEM — J45.31 MILD PERSISTENT ASTHMA WITH ACUTE EXACERBATION (HCC): Status: RESOLVED | Noted: 2022-08-11 | Resolved: 2023-12-28

## 2023-12-28 PROBLEM — F39 MOOD DISORDER: Status: ACTIVE | Noted: 2023-12-28

## 2023-12-28 PROBLEM — R63.4 UNINTENTIONAL WEIGHT LOSS: Status: RESOLVED | Noted: 2021-07-31 | Resolved: 2023-12-28

## 2023-12-28 PROBLEM — J98.01 COUGH DUE TO BRONCHOSPASM: Status: RESOLVED | Noted: 2021-07-26 | Resolved: 2023-12-28

## 2023-12-28 PROBLEM — Z30.2 REQUEST FOR STERILIZATION: Status: RESOLVED | Noted: 2020-10-08 | Resolved: 2023-12-28

## 2023-12-28 PROBLEM — R05.9 COUGH: Status: RESOLVED | Noted: 2021-07-31 | Resolved: 2023-12-28

## 2023-12-28 PROBLEM — J45.41 MODERATE PERSISTENT ASTHMA WITH (ACUTE) EXACERBATION (HCC): Status: RESOLVED | Noted: 2023-04-28 | Resolved: 2023-12-28

## 2023-12-28 PROBLEM — U07.1 ACUTE BRONCHITIS DUE TO SEVERE ACUTE RESPIRATORY SYNDROME CORONAVIRUS 2 (SARS-COV-2): Status: RESOLVED | Noted: 2022-01-25 | Resolved: 2023-12-28

## 2023-12-28 PROBLEM — J98.01 BRONCHOSPASM: Status: RESOLVED | Noted: 2021-07-31 | Resolved: 2023-12-28

## 2023-12-28 PROBLEM — R10.84 GENERALIZED ABDOMINAL PAIN: Status: RESOLVED | Noted: 2021-07-31 | Resolved: 2023-12-28

## 2023-12-28 PROBLEM — J45.41 MODERATE PERSISTENT ASTHMA WITH (ACUTE) EXACERBATION: Status: RESOLVED | Noted: 2023-04-28 | Resolved: 2023-12-28

## 2023-12-28 PROBLEM — M25.551 HIP PAIN, ACUTE, RIGHT: Status: RESOLVED | Noted: 2022-08-11 | Resolved: 2023-12-28

## 2023-12-28 PROBLEM — Z30.2 ENCOUNTER FOR STERILIZATION: Status: RESOLVED | Noted: 2019-06-15 | Resolved: 2023-12-28

## 2023-12-28 PROBLEM — F39 MOOD DISORDER (HCC): Status: ACTIVE | Noted: 2023-12-28

## 2023-12-28 RX ORDER — METHOTREXATE 25 MG/ML
INJECTION INTRA-ARTERIAL; INTRAMUSCULAR; INTRATHECAL; INTRAVENOUS
Qty: 1.12 ML | Refills: 0 | OUTPATIENT
Start: 2023-12-28

## 2024-01-03 ENCOUNTER — PATIENT MESSAGE (OUTPATIENT)
Dept: FAMILY MEDICINE CLINIC | Facility: CLINIC | Age: 44
End: 2024-01-03

## 2024-01-03 ENCOUNTER — PATIENT MESSAGE (OUTPATIENT)
Dept: RHEUMATOLOGY | Facility: CLINIC | Age: 44
End: 2024-01-03

## 2024-01-03 DIAGNOSIS — M06.09 RHEUMATOID ARTHRITIS OF MULTIPLE SITES WITHOUT RHEUMATOID FACTOR (HCC): Primary | ICD-10-CM

## 2024-01-03 RX ORDER — METHOTREXATE 25 MG/ML
20 INJECTION INTRA-ARTERIAL; INTRAMUSCULAR; INTRATHECAL; INTRAVENOUS WEEKLY
Qty: 4 EACH | Refills: 0 | Status: SHIPPED | OUTPATIENT
Start: 2024-01-03

## 2024-01-03 NOTE — TELEPHONE ENCOUNTER
From: Karol Packer  To: Adriane Blanton  Sent: 1/3/2024 5:46 AM CST  Subject: Medication refill     Good morning,    I am just writing to ask that a refill of my methotrexate injection be sent in to [facility].     Also I wanted to be sure that my eye doctor sent over my eye exam to you as well.    Thank you!!  Tucker Packer

## 2024-01-03 NOTE — TELEPHONE ENCOUNTER
LOV:   07/24/2023      Future Appointments   Date Time Provider Department Center   1/6/2024  7:30 AM REF SO [facility] REF [facility] Ref [facility]   2/5/2024  1:20 PM Adriane Blanton MD [facility] [address]       LF:   11/29/2023        QTY:   8 mL        Refills:    0    LABS:       Component      Latest Ref Rng 9/30/2023   WBC      4.0 - 11.0 x10(3) uL 8.9    RBC      3.80 - 5.30 x10(6)uL 4.02    Hemoglobin      12.0 - 16.0 g/dL 13.2    Hematocrit      35.0 - 48.0 % 41.0    Platelet Count      150.0 - 450.0 10(3)uL 245.0    MCV      80.0 - 100.0 fL 102.0 (H)    MCH      26.0 - 34.0 pg 32.8    MCHC      31.0 - 37.0 g/dL 32.2    RDW      % 15.1    Prelim Neutrophil Abs      1.50 - 7.70 x10 (3) uL 5.59    Neutrophils Absolute      1.50 - 7.70 x10(3) uL 5.59    Lymphocytes Absolute      1.00 - 4.00 x10(3) uL 2.37    Monocytes Absolute      0.10 - 1.00 x10(3) uL 0.58    Eosinophils Absolute      0.00 - 0.70 x10(3) uL 0.28    Basophils Absolute      0.00 - 0.20 x10(3) uL 0.03    Immature Granulocyte Absolute      0.00 - 1.00 x10(3) uL 0.03    Neutrophils %      % 63.0    Lymphocytes %      % 26.7    Monocytes %      % 6.5    Eosinophils %      % 3.2    Basophils %      % 0.3    Immature Granulocyte %      % 0.3    Glucose      70 - 99 mg/dL 99    Sodium      136 - 145 mmol/L 138    Potassium      3.5 - 5.1 mmol/L 3.7    Chloride      98 - 112 mmol/L 109    Carbon Dioxide, Total      21.0 - 32.0 mmol/L 25.0    ANION GAP      0 - 18 mmol/L 4    BUN      7 - 18 mg/dL 17    CREATININE      0.55 - 1.02 mg/dL 0.63    CALCIUM      8.5 - 10.1 mg/dL 9.1    CALCULATED OSMOLALITY      275 - 295 mOsm/kg 288    EGFR      >=60 mL/min/1.73m2 113    AST (SGOT)      15 - 37 U/L 14 (L)    ALT (SGPT)      13 - 56 U/L 14    ALKALINE PHOSPHATASE      37 - 98 U/L 67    Total Bilirubin      0.1 - 2.0 mg/dL 0.3    PROTEIN, TOTAL      6.4 - 8.2 g/dL 6.4    Albumin      3.4 - 5.0 g/dL 3.8    Globulin      2.8 - 4.4 g/dL 2.6 (L)    A/G Ratio      1.0  - 2.0  1.5    Patient Fasting for CMP? Yes       Legend:  (H) High  (L) Low

## 2024-01-06 ENCOUNTER — LAB ENCOUNTER (OUTPATIENT)
Dept: LAB | Age: 44
End: 2024-01-06
Attending: INTERNAL MEDICINE
Payer: COMMERCIAL

## 2024-01-06 DIAGNOSIS — M06.09 RHEUMATOID ARTHRITIS OF MULTIPLE SITES WITHOUT RHEUMATOID FACTOR (HCC): ICD-10-CM

## 2024-01-06 DIAGNOSIS — M81.8 OTHER OSTEOPOROSIS WITHOUT CURRENT PATHOLOGICAL FRACTURE: ICD-10-CM

## 2024-01-06 DIAGNOSIS — M19.90 OSTEOARTHRITIS, UNSPECIFIED OSTEOARTHRITIS TYPE, UNSPECIFIED SITE: ICD-10-CM

## 2024-01-06 LAB
ALBUMIN SERPL-MCNC: 4.1 G/DL (ref 3.4–5)
ALBUMIN/GLOB SERPL: 1.5 {RATIO} (ref 1–2)
ALP LIVER SERPL-CCNC: 85 U/L
ALT SERPL-CCNC: 22 U/L
ANION GAP SERPL CALC-SCNC: 4 MMOL/L (ref 0–18)
AST SERPL-CCNC: 7 U/L (ref 15–37)
BASOPHILS # BLD AUTO: 0.04 X10(3) UL (ref 0–0.2)
BASOPHILS NFR BLD AUTO: 0.7 %
BILIRUB SERPL-MCNC: 0.2 MG/DL (ref 0.1–2)
BUN BLD-MCNC: 17 MG/DL (ref 9–23)
CALCIUM BLD-MCNC: 9.2 MG/DL (ref 8.5–10.1)
CHLORIDE SERPL-SCNC: 113 MMOL/L (ref 98–112)
CO2 SERPL-SCNC: 25 MMOL/L (ref 21–32)
CREAT BLD-MCNC: 0.67 MG/DL
EGFRCR SERPLBLD CKD-EPI 2021: 111 ML/MIN/1.73M2 (ref 60–?)
EOSINOPHIL # BLD AUTO: 0.27 X10(3) UL (ref 0–0.7)
EOSINOPHIL NFR BLD AUTO: 4.4 %
ERYTHROCYTE [DISTWIDTH] IN BLOOD BY AUTOMATED COUNT: 13.5 %
ERYTHROCYTE [SEDIMENTATION RATE] IN BLOOD: 2 MM/HR
FASTING STATUS PATIENT QL REPORTED: YES
GLOBULIN PLAS-MCNC: 2.8 G/DL (ref 2.8–4.4)
GLUCOSE BLD-MCNC: 94 MG/DL (ref 70–99)
HCT VFR BLD AUTO: 40.2 %
HGB BLD-MCNC: 13 G/DL
IMM GRANULOCYTES # BLD AUTO: 0.03 X10(3) UL (ref 0–1)
IMM GRANULOCYTES NFR BLD: 0.5 %
LYMPHOCYTES # BLD AUTO: 1.94 X10(3) UL (ref 1–4)
LYMPHOCYTES NFR BLD AUTO: 31.6 %
MCH RBC QN AUTO: 32.9 PG (ref 26–34)
MCHC RBC AUTO-ENTMCNC: 32.3 G/DL (ref 31–37)
MCV RBC AUTO: 101.8 FL
MONOCYTES # BLD AUTO: 0.44 X10(3) UL (ref 0.1–1)
MONOCYTES NFR BLD AUTO: 7.2 %
NEUTROPHILS # BLD AUTO: 3.41 X10 (3) UL (ref 1.5–7.7)
NEUTROPHILS # BLD AUTO: 3.41 X10(3) UL (ref 1.5–7.7)
NEUTROPHILS NFR BLD AUTO: 55.6 %
OSMOLALITY SERPL CALC.SUM OF ELEC: 295 MOSM/KG (ref 275–295)
PLATELET # BLD AUTO: 198 10(3)UL (ref 150–450)
POTASSIUM SERPL-SCNC: 3.9 MMOL/L (ref 3.5–5.1)
PROT SERPL-MCNC: 6.9 G/DL (ref 6.4–8.2)
RBC # BLD AUTO: 3.95 X10(6)UL
SODIUM SERPL-SCNC: 142 MMOL/L (ref 136–145)
WBC # BLD AUTO: 6.1 X10(3) UL (ref 4–11)

## 2024-01-06 PROCEDURE — 80053 COMPREHEN METABOLIC PANEL: CPT

## 2024-01-06 PROCEDURE — 85652 RBC SED RATE AUTOMATED: CPT

## 2024-01-06 PROCEDURE — 85025 COMPLETE CBC W/AUTO DIFF WBC: CPT

## 2024-01-06 PROCEDURE — 36415 COLL VENOUS BLD VENIPUNCTURE: CPT

## 2024-01-06 PROCEDURE — 86225 DNA ANTIBODY NATIVE: CPT

## 2024-01-08 LAB — DSDNA IGG SERPL IA-ACNC: 3 IU/ML

## 2024-02-05 ENCOUNTER — OFFICE VISIT (OUTPATIENT)
Dept: RHEUMATOLOGY | Facility: CLINIC | Age: 44
End: 2024-02-05
Payer: COMMERCIAL

## 2024-02-05 VITALS
DIASTOLIC BLOOD PRESSURE: 80 MMHG | WEIGHT: 118 LBS | HEART RATE: 85 BPM | HEIGHT: 67 IN | TEMPERATURE: 98 F | BODY MASS INDEX: 18.52 KG/M2 | SYSTOLIC BLOOD PRESSURE: 104 MMHG | RESPIRATION RATE: 16 BRPM | OXYGEN SATURATION: 96 %

## 2024-02-05 DIAGNOSIS — M32.9 LUPUS (HCC): ICD-10-CM

## 2024-02-05 DIAGNOSIS — M79.7 FIBROMYALGIA: Primary | ICD-10-CM

## 2024-02-05 DIAGNOSIS — M06.09 RHEUMATOID ARTHRITIS OF MULTIPLE SITES WITHOUT RHEUMATOID FACTOR (HCC): ICD-10-CM

## 2024-02-05 PROCEDURE — 99214 OFFICE O/P EST MOD 30 MIN: CPT | Performed by: INTERNAL MEDICINE

## 2024-02-05 RX ORDER — METHOTREXATE 25 MG/ML
20 INJECTION INTRA-ARTERIAL; INTRAMUSCULAR; INTRATHECAL; INTRAVENOUS WEEKLY
Qty: 4 EACH | Refills: 2 | Status: SHIPPED | OUTPATIENT
Start: 2024-02-05

## 2024-02-05 RX ORDER — NEEDLES, SAFETY 22GX1 1/2"
NEEDLE, DISPOSABLE MISCELLANEOUS
Qty: 16 EACH | Refills: 3 | Status: SHIPPED | OUTPATIENT
Start: 2024-02-05

## 2024-02-05 RX ORDER — METHYLPHENIDATE HYDROCHLORIDE 27 MG/1
27 TABLET, EXTENDED RELEASE ORAL EVERY MORNING
COMMUNITY

## 2024-02-05 RX ORDER — FOLIC ACID 1 MG/1
1 TABLET ORAL DAILY
Qty: 90 TABLET | Refills: 1 | Status: SHIPPED | OUTPATIENT
Start: 2024-02-05

## 2024-02-05 RX ORDER — GABAPENTIN 300 MG/1
600 CAPSULE ORAL NIGHTLY
Qty: 60 CAPSULE | Refills: 5 | Status: SHIPPED | OUTPATIENT
Start: 2024-02-05

## 2024-02-05 RX ORDER — GABAPENTIN 100 MG/1
100 CAPSULE ORAL NIGHTLY
Qty: 90 CAPSULE | Refills: 5 | Status: SHIPPED | OUTPATIENT
Start: 2024-02-05

## 2024-02-05 NOTE — PROGRESS NOTES
[address]     Progress Note     Karol Packer Patient Status:  No patient class for patient encounter    1980 MRN [id number]   Location [facility], [address] [place] [address] Attending No att. providers found   Hosp Day # 0 PCP Leni Schumacher DO     Chief Complaint:     Subjective:   S:     43-year-old woman comes in for reevaluation for lupus and fibromyalgia    She was last seen in clinic 2023  She has been stable on methotrexate 20 mg subcu week and hydroxychloroquine 400 mg daily  Eye exam was normal 2023  No major flareups  Recently diagnosed with ADHD along with depression and anxiety and is going was started on Concerta about 6 months ago  Significant improvement in her nonrestorative sleep fatigue  Continues to have stressors at work triggering her generalized pain and neuropathy no swelling of her joints  She is on gabapentin 600 mg at bedtime.  She is open to increasing it by 100 mg at bedtime over the next 2 to 3 months  She states no shortness of breath chest pain fevers or chills  Denies any flareups of swelling of her joints  Recent labs normal 2024  Has some weight loss but she states this is more related to her depression and she is working on it with a psychiatrist  She has lost 2 pounds in the last 6 months likely because of her Concerta for ADHD  States no rashes oral nasal ulcers  No night sweats.  She has no concerns at this time.  Recent labs May 2023 normal      Previously in 2022  She had continued persistent hearing loss but stable  She had seen ENT who stated some stability but some worsening  They had recommended seeing a neurologist again which she has made an appointment at [facility] with a new neurologist in 2022  She was told nothing concerning based on MRI and maybe yearly MRI of the brain  Autoimmune testing repeated in 2022 was normal including  double-stranded DNA and complements  She continued methotrexate along with hydroxychloroquine  States no rashes no shortness of breath that is worse.    Her depression and anxiety are fairly stable.   States no weakness no swelling of her joints  Or new worsening pain.  States no rashes oral or nasal ulcers  Her weight has stabilized    Has longstanding history of hip impingement issues that come and go and does exercises on her own.     She has a grandson who is 2 months old which she is very active bleeding involved with an quite excited about.    Previously hearing loss had started November 2021  She had extensive workup and seen ENT and neurology subsequently  She had MRI that was abnormal and unclear for lesions but then further workup revealed no indication of multiple sclerosis lumbar puncture also was unrevealing  Since her double-strand DNA and complements were low. Suspected considering possibility of lupus related CNS disease  She was placed on steroids along with CellCept for at least 6-8 weeks  She cannot tolerate the CellCept after 4-6 weeks and stopped because of significant side effects  She has remained off steroids  She has some residual pulmonary issues but improving overall  She is gotten a chest x-ray which was normal  Her PCP thinks it is related to her allergies and she is on inhalers with improvement  We were previously considering Benlysta as an additional medication if her lupus testing is concerning    I discussed possibility of CNS related lupus with testing being abnormal which could possibly explain some of her manifestations but not clearly    She verbalizes understanding and agrees with this plan    She was continued on methotrexate was 20 mg subcutaneous every week    Previous MRI which showed below findings    IMPRESSION:    1. Normal internal auditory canals bilaterally.  2. 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  3. A few small areas of T2 signal prolongation on FLAIR imaging perpendicularly oriented to the left  lateral ventricle in the region of the DVA, likely representing adjacent minimal gliosis..    FINDINGS: The internal auditory canals are symmetric. No abnormal enhancement following contrast  administration. The perimesencephalic and cerebellopontine angle cisterns are normal. FLAIR images demonstrate minimal periventricular white matter T2 enhancement perpendicular oriented to the left lateral ventricle. Otherwise, no abnormal signal. There is no restricted diffusion. Whole brain contrast enhanced images demonstrate no abnormal enhancement except for the small developmental venous anomaly in the left corona radiata. This is also near the area of the T2 signal prolongation near the lateral ventricle on the left, and likely accounts for that finding. Demyelination from multiple sclerosis is felt to be less likely, although can be seen with this type of white matterchange, since the patient does not carry a diagnosis of multiple sclerosis, and no similar findings  are seen throughout the remainder of the brain. This needs clinical correlation and follow-up.    She states her neurologist thinks that the cognitive issues could be related to Topamax but she would like to remain on this since this controlled her migraines overall  Prior to that She had malar rash, and polyarthritis and positive double strand DNA and low complements suggestive of active disease    This has resolved or joint swelling and stiffness and also some of her rash on her face    Her double strand DNA had improved     She states her side effects have improved but she has reflux from stress at work and thinks she has an ulcer. She has been doing over-the-counter reflux medications. She has not made appointment with GI yet. Her weight has stabilized    Previously CT of the chest showed some mild lung collapse but  no infiltrates.  CT abdomen and pelvis showed some gastric wall thickening but no acute findings    She does not feel like the weight loss is from her lupus and more her stress.        Her last imaging was May 2021 and showed multiple pulmonary nodules. She has been started on oral inhalers with improvement.    Chest x-ray was normal prior to starting methotrexate.    She states she is not planning on getting pregnant and understands the risk of doing so and she does not drink alcohol.    Risks and side effects discussed in detail including possibility of cytopenias kidney liver involvement.    She has remained on gabapentin 600 mg daily, hydroxychloroquine 400 mg daily.     Her eye exam was done By another ophthalmologist October 2021 and negative for toxicity    She states no vision changes or side effects otherwise    She  has multiple external allergies and is followed closely by her primary care physician with occasional wheezing and recently added Singulair albuterol and she does take over-the-counter Flonase as well.    She periodically has some stressors with her 19-year-old daughter quitting school and moving to [place] with her boyfriend.     Her daughter was diagnosed with high functioning autism and Tourette's syndrome. Overall she is trying to cope with this.     She does have some mild nonrestorative sleep and fatigue. Denies any suicidal ideation or active depression. She has general achiness overall.     Denies any photosensitive malar rash no swelling of her joints no oral or nasal ulcers. Her weight has been stable.     No night sweats fevers or chills Her pain levels are minimal and not concerning for her.       Review of Systems:     Constitutional: Negative for activity change, chills, diaphoresis, fatigue, fever and unexpected weight change.    HENT: Negative for congestion, hearing loss and mouth sores.    Eyes:  Negative for pain, redness and visual disturbance.    Respiratory: Negative for  apnea, cough, chest tightness, occasional shortness of breath, occasional wheezing Cardiovascular: Negative for chest pain, palpitations and leg swelling.    Gastrointestinal: Negative for abdominal distention, abdominal pain, blood in stool, constipation, diarrhea and nausea.    Endo: Negative.    Genitourinary:  Negative for decreased urine volume, difficulty urinating, dysuria, and frequency.     Musculoskeletal: Occasional arthralgias, no gait problem and joint swelling.    Skin: Negative. Negative for color change, pallor and rash. No raynauds or digital ulcerations.    Allergic/Immunologic: Negative.    Neurological: Negative. Negative for dizziness, tremors, seizures, syncope,  speech difficulty, weakness, light-headedness, numbness and headaches.    Hematological:  Does not bruise/bleed easily.    Psychiatric/Behavioral: Negative depression, confusion, decreased concentration, self-injury, sleep disturbance and suicidal ideas. The patient is not nervous/anxious.    Objective:   Vital signs:  [unfilled]    Wt Readings from Last 3 Encounters:   02/05/24 118 lb (53.5 kg)   12/15/23 120 lb (54.4 kg)   11/06/23 125 lb 12.8 oz (57.1 kg)       Intake/Output:  [unfilled]    [unfilled]  Physical Exam:      Constitutional: Is oriented to person, place, and time. No distress.    HEENT: Normocephalic, no jvd; no lad; EOMI; good oral pooling; no oral or nasal ulcers.    Eyes: Conjunctivae and EOM are normal. Pupils are equal, round, and reactive to light.    Neck: Normal range of motion. No thyromegaly present.    Cardiovascular: RRR, no murmurs.    Lungs: Clear, Bilateral air entry, + wheezes.    Abdominal: Soft. Nontender; nondistended; BS+.    Musculoskeletal:       Right shoulder:  Exhibits normal range of motion on abduction and internal rotation, no tenderness, no bony tenderness, no deformity, no laceration, no pain and no spasm.       Left shoulder: Exhibits normal range of motion on abduction and internal  and external rotation.  no tenderness, no bony tenderness, no swelling, no effusion, no deformity, no pain, no spasm and normal strength.       Right elbow: Exhibits normal range of motion, no swelling, no effusion and no deformity. No tenderness found. No medial epicondyle, no lateral epicondyle and no olecranon process tenderness noted. There are no contractures or tophi or nodules.       Left elbow: Exhibits normal range of motion, no swelling, no effusion and no deformity. No tenderness found. No medial epicondyle, no lateral epicondyle and no olecranon process tenderness noted.  There are no contractures or tophi or nodules       Right wrist: Exhibits normal range of motion, no tenderness, no bony tenderness, no swelling, no effusion and no crepitus. Flexion and extension intact without limitation.       Left wrist:  Exhibits normal range of motion, no tenderness, no bony tenderness, no swelling, no effusion, no crepitus and no deformity. Flexion and extension intact without limitation.       Right hip: Exhibits normal range of motion, normal strength, no tenderness, no bony tenderness, no swelling and no crepitus.       Right hand: No synovitis of MCP,PIP or DIP joints; no Bouchards or Heberden nodules noted;  strength: 100%.       Left hand: No synovitis of MCP,PIP or DIP joints; no Bouchards or Heberden nodules noted;  strength: 100%.       Left hip: Exhibits normal range of motion, normal strength, no tenderness, no bony tenderness, no swelling and no crepitus.       Right Hip: Normal without LROM or TTP       Right knee: Exhibits normal range of motion, no swelling, no effusion, no ecchymosis, no deformity and no erythema. No tenderness found. No medial joint line, no lateral joint line, no MCL and no LCL tenderness noted. No crepitation found on flexion and extension normal.       Left knee: Exhibits normal range of motion, no swelling, no effusion, no ecchymosis and no erythema. No tenderness  found. No medial joint line, no lateral joint line and no patellar tendon tenderness noted. No crepitation found on flexion and flexion intact.       Right ankle: Exhibits normal range of motion, no swelling, no deformity and no laceration. No tenderness. No lateral malleolus and no medial malleolus tenderness found. Achilles tendon normal. Achilles tendon exhibits no pain, no defect and normal White's test results.       Left ankle: Exhibits normal range of motion, no swelling, and no deformity. No tenderness. No lateral malleolus and no medial malleolus tenderness found.       Cervical back: Exhibits normal range of motion, no tenderness, no bony tenderness, no swelling, no pain and no spasm.       Thoracic back: Exhibits normal range of motion, no tenderness, no bony tenderness and no spasm.       Lumbar back: Exhibits normal range of motion, no tenderness, no bony tenderness, no pain and no spasm.       Right foot: No tenderness, no bony tenderness, no crepitus and no laceration. There is no synovitis or tenderness of the MTP joints to palpation.  Mild soft tissue swelling MTP joints       Left foot: No tenderness, no bony tenderness and no crepitus. There is no synovitis or tenderness of the MTP joints to palpation.  Mild soft tissue swelling MTP joints    Lymphadenopathy: No noted lympadenopathy.    Neurological: Is alert and oriented. No focal motor or sensory abnormalities.    Skin: Skin is warm, dry and intact.    Psychiatric: Normal behavior    Tender points upper extremities lower extremities everywhere she is touch 12 out of 12 tender points    Results:   Diagnostic Data:      Labs:    [unfilled]    Imaging:   No results found.     Reviewed CBC CMP complement C3-C4 double-stranded DNA normal May 2023    Medications:     Assessment & Plan:   ASSESSMENT / PLAN:     43-year-old woman comes in for reevaluation for:    Systemic lupus without renal manifestations  Mild generalized  osteoarthritis  Fibromyalgia  Depression  History of vitamin D deficiency  Reactive airway disease likely related to asthma and allergies  And residual coronavirus related lung disease (see chest x-ray normal)  Abnormal T2 restricted changes concerns for demyelination on MRI of the brain with new onset right hearing loss for the last 4 weeks followed by ENT  Unclear etiology could consider CNS lupus related changes  Suspect right hip impingement syndrome followed by PCP/status post physical therapy with improvement    Patient has no obvious synovitis on exam    Multiple tender points on exam with the increase in stress levels    He is open to increasing gabapentin by 100 mg at bedtime on top of the 600 mg at bedtime over the next 3 to 4 weeks to 900 mg at bedtime    Recent CBC CMP double-stranded DNA ESR normal January 2024    CT abdomen and pelvis chest reviewed with patient. Follow up with gastroenterology for EGD    Advised to consider Prilosec 40 mg daily    Previously had given her information to gastroenterology to likely proceed with EGD as the next step    Continue methotrexate to 20 mg subcutaneous every week and continue folic acid    Continue hydroxychloroquine 400 mg daily.    Eye exam normal in 2023    Recent labs January 2024 normal    Update labs today and give new standing labs to be done every 3 months    She states she is going to another retinal specialist actually make sure there is no issues    Lumbar puncture unrevealing for multiple sclerosis    Per patient neurologist does not think she has multiple sclerosis    She is getting a second opinion from neurologist at [facility] which I'm agreeable with. She likely needs another imaging of her brain. States the other neurologist wanted to wait. She does feel stable enough to wait September appointment nothing has worsened clinically from her perspective    She cannot tolerate CellCept which was stopped about 12  months ago    Consider  Mary Jane in the future if her double-strand DNA is abnormal to treat possibility of CNS related manifestations of lupus    Her inflammatory arthritis is finally under control.    Her weight has also stabilized.    Agree with Singulair and albuterol when necessary; This is likely related to her allergies and viral syndrome. There have been cases of ankle associated vasculitis with Singulair    Chest x-ray normal Jan 2022    She should get pulmonary function test through her primary care physician    Depression is stable on Zoloft 100 mg daily and trazodone 50 mg at bedtime now followed by psychiatry.  Now on Concerta in the last 6 months with overall improvement of her ADHD    She is on 2 additional medications which have improved her symptoms    Continue to encourage low intensity exercise and stress management    discussed importance of stress management.    And on vitamin D levels today    Follow-up with PCP for migraine headaches. If this is related to lupus it will improve with prednisone and methotrexate. Otherwise symptomatic control through PCP        Education and counseling provided to patient on medications and diagnosis. Instructed patient to call my office or seek medical attention immediately if symptoms worsen.    Return to clinic:  Return in about 6 months (around 8/5/2024).    Adriane Blanton MD

## 2024-02-09 DIAGNOSIS — M06.09 RHEUMATOID ARTHRITIS OF MULTIPLE SITES WITHOUT RHEUMATOID FACTOR (HCC): Primary | ICD-10-CM

## 2024-02-09 DIAGNOSIS — G43.109 MIGRAINE WITH AURA AND WITHOUT STATUS MIGRAINOSUS, NOT INTRACTABLE: ICD-10-CM

## 2024-02-09 DIAGNOSIS — Z79.899 ENCOUNTER FOR LONG-TERM CURRENT USE OF MEDICATION: ICD-10-CM

## 2024-02-09 RX ORDER — SUMATRIPTAN 50 MG/1
TABLET, FILM COATED ORAL
Qty: 9 TABLET | Refills: 1 | Status: SHIPPED | OUTPATIENT
Start: 2024-02-09

## 2024-02-09 NOTE — TELEPHONE ENCOUNTER
LOV:   02/05/2024        Future Appointments   Date Time Provider Department Center   8/5/2024 10:20 AM Adriane Blanton MD [id number] [address]       LF:  07/24/2023    QTY:   60    Refills:   3    EYE EXAM:   10/21/2023

## 2024-02-09 NOTE — TELEPHONE ENCOUNTER
Requested Prescriptions     Signed Prescriptions Disp Refills    SUMATRIPTAN 50 MG Oral Tab 9 tablet 1     Sig: TAKE AT 1ST SIGN/SYMPTOM OF MIGRAINE HEADACHE. MAY REPEAT DOSE IN 2HRS FOR 1 TIME MAX 100MG IN 24HRS     Authorizing Provider: JERRY MCNALLY     Ordering User: AMBER BURGOS     Refilled per protocol/OV notes

## 2024-02-12 RX ORDER — HYDROXYCHLOROQUINE SULFATE 200 MG/1
400 TABLET, FILM COATED ORAL DAILY
Qty: 180 TABLET | Refills: 2 | Status: SHIPPED | OUTPATIENT
Start: 2024-02-12

## 2024-03-07 ENCOUNTER — TELEPHONE (OUTPATIENT)
Dept: FAMILY MEDICINE CLINIC | Facility: CLINIC | Age: 44
End: 2024-03-07

## 2024-03-07 ENCOUNTER — OFFICE VISIT (OUTPATIENT)
Dept: FAMILY MEDICINE CLINIC | Facility: CLINIC | Age: 44
End: 2024-03-07
Payer: COMMERCIAL

## 2024-03-07 VITALS
RESPIRATION RATE: 16 BRPM | BODY MASS INDEX: 18.36 KG/M2 | SYSTOLIC BLOOD PRESSURE: 121 MMHG | HEIGHT: 67 IN | TEMPERATURE: 98 F | DIASTOLIC BLOOD PRESSURE: 81 MMHG | HEART RATE: 93 BPM | OXYGEN SATURATION: 97 % | WEIGHT: 117 LBS

## 2024-03-07 DIAGNOSIS — J06.9 VIRAL URI: ICD-10-CM

## 2024-03-07 DIAGNOSIS — J32.9 RHINOSINUSITIS: Primary | ICD-10-CM

## 2024-03-07 LAB
OPERATOR ID: NORMAL
POCT LOT NUMBER: NORMAL
RAPID SARS-COV-2 BY PCR: NOT DETECTED

## 2024-03-07 PROCEDURE — U0002 COVID-19 LAB TEST NON-CDC: HCPCS

## 2024-03-07 PROCEDURE — 99213 OFFICE O/P EST LOW 20 MIN: CPT

## 2024-03-07 RX ORDER — AZITHROMYCIN 250 MG/1
TABLET, FILM COATED ORAL
Qty: 6 TABLET | Refills: 0 | Status: SHIPPED | OUTPATIENT
Start: 2024-03-10 | End: 2024-03-15

## 2024-03-07 NOTE — TELEPHONE ENCOUNTER
URGENT SICK CALL    Karol Packer  LOV: 11/6/2023     Patient experiencing tight/burning in chest, sore throat, tight cough, sinus congestion (green boogers), pt not coughing up mucus  Duration/Onset of symptoms: started Sunday    No appointments available with Dr. Schumacher.     Please call patient back at your earliest convenience.

## 2024-03-07 NOTE — PROGRESS NOTES
CHIEF COMPLAINT:     Chief Complaint   Patient presents with    Cold     Congestion, sinus pain, ear pain, chest tightness, chills, x4 days       HPI:   Karol Packer is a 43 year old female who presents for upper respiratory symptoms for  4 days. Patient reports congestion, dry cough, sinus pain, chills, denies fever. Symptoms have been without change since onset.  Treating symptoms with no home treatments. Denies any known exposures or other members in house sick with similar symptoms.       Current Outpatient Medications   Medication Sig Dispense Refill    hydroxychloroquine 200 MG Oral Tab Take 2 tablets (400 mg total) by mouth daily. 180 tablet 2    SUMATRIPTAN 50 MG Oral Tab TAKE AT 1ST SIGN/SYMPTOM OF MIGRAINE HEADACHE. MAY REPEAT DOSE IN 2HRS FOR 1 TIME MAX 100MG IN 24HRS 9 tablet 1    CONCERTA 27 MG Oral Tab CR Take 1 tablet (27 mg total) by mouth every morning.      Methotrexate Sodium, PF, 50 MG/2ML Injection Solution Inject 0.8 mL (20 mg total) into the skin once a week. Inject 20 mg as directed 4 each 2    gabapentin 300 MG Oral Cap Take 2 capsules (600 mg total) by mouth nightly. 60 capsule 5    gabapentin 100 MG Oral Cap Take 1 capsule (100 mg total) by mouth nightly. At 100mg at bedtime per week as tolerated with  additional 600mg at bedtime 90 capsule 5    folic acid 1 MG Oral Tab Take 1 tablet (1 mg total) by mouth daily. 90 tablet 1    fluticasone-salmeterol (ADVAIR DISKUS) 250-50 MCG/ACT Inhalation Aerosol Powder, Breath Activated Inhale 1 puff into the lungs every 12 (twelve) hours. Gargle with plain water after each use 3 each 1    topiramate 100 MG Oral Tab Take 1 tablet (100 mg total) by mouth 2 (two) times daily. 180 tablet 1    traZODone 50 MG Oral Tab Take 1 tablet (50 mg total) by mouth. 0.5-1 tablet by mouth nightly as needed      albuterol (PROAIR HFA) 108 (90 Base) MCG/ACT Inhalation Aero Soln Inhale 2 puffs into the lungs every 6 (six) hours as needed for Wheezing or Shortness  of Breath (Cough). 1 each 0    montelukast 10 MG Oral Tab Take 1 tablet (10 mg total) by mouth nightly. 90 tablet 3    benztropine 1 MG Oral Tab Take 1 tablet (1 mg total) by mouth 2 (two) times daily.      BD TB SYRINGE 25G X 5/8\" 1 ML Does not apply Misc       busPIRone HCl 15 MG Oral Tab Take by mouth 2 (two) times a day.        Cholecalciferol (VITAMIN D3) 125 MCG (5000 UT) Oral Tab Take 1 tablet (5,000 Units total) by mouth daily.      loratadine 10 MG Oral Tab Take 1 tablet (10 mg total) by mouth daily.      Sertraline HCl 100 MG Oral Tab Take 2 tablets (200 mg total) by mouth daily. Take 2 tab once a day      ALPRAZolam 0.25 MG Oral Tab Take 1 tablet (0.25 mg total) by mouth daily as needed.      Fluticasone Propionate 50 MCG/ACT Nasal Suspension 2 sprays by Each Nare route daily. 1 Bottle 3    Syringe/Needle, Disp, (BD SAFETYGLIDE SYRINGE/NEEDLE) 27G X 5/8\" 1 ML Does not apply Misc To be used for Methotrexate injection once weekly 16 each 3      Past Medical History:   Diagnosis Date    Acute bronchitis due to severe acute respiratory syndrome coronavirus 2 (SARS-CoV-2)     Asthma (HCC)     Cervical polyp 04/02/2018    Depression     Hip pain, acute, right     In area of anterior superior iliac spine    Lupus (HCC)     Migraines     Status post bilateral salpingectomy     Unintentional weight loss     As of 7/19/2021 maintaining weight since May or June 2021      Past Surgical History:   Procedure Laterality Date    COLONOSCOPY N/A 6/2/2021    Procedure: COLONOSCOPY;  Surgeon: Jose Heard DO;  Location:  ENDOSCOPY    COLPOSCOPY, CERVIX INC UPPER/ADJACENT VAGINA      COLPOSCOPY,LOOP ELECTRD CERVIX EXCIS      CONIZATION CERVIX,LOOP ELECTRD      SALPINGECTOMY Bilateral 12/09/2020    Laparoscopy    TONSILLECTOMY           Social History     Socioeconomic History    Marital status: Single   Tobacco Use    Smoking status: Former     Packs/day: 0.50     Years: 24.00     Additional pack years: 0.00     Total  pack years: 12.00     Types: Cigarettes     Quit date: 2019     Years since quittin.1    Smokeless tobacco: Never   Vaping Use    Vaping Use: Never used   Substance and Sexual Activity    Alcohol use: Yes     Comment: social    Drug use: No    Sexual activity: Not Currently   Other Topics Concern    Caffeine Concern Yes     Comment: 2 cups of coffee daily    Exercise No     Comment: Daily walking    Seat Belt Yes         REVIEW OF SYSTEMS:   GENERAL: no change in appetite  SKIN: no rashes or abnormal skin lesions  HEENT: See HPI  LUNGS: See HPI  CARDIOVASCULAR: denies chest pain or palpitations   GI: denies N/V/C or abdominal pain      EXAM:   /81   Pulse 93   Temp 98.3 °F (36.8 °C) (Temporal)   Resp 16   Ht 5' 7\" (1.702 m)   Wt 117 lb (53.1 kg)   LMP 2024 (Approximate)   SpO2 97%   BMI 18.32 kg/m²   GENERAL: well developed, well nourished,in no apparent distress  SKIN: no rashes,no suspicious lesions  HEAD: atraumatic, normocephalic.  positive tenderness on palpation of maxillary sinuses  EYES: conjunctiva clear, EOM intact  EARS: TM's pearly and intact, no bulging, no retraction,no fluid, bony landmarks visualized  NOSE: Nostrils patent, no nasal discharge, nasal mucosa non-inflamed   THROAT: Oral mucosa pink, moist. Posterior pharynx is non erythematous. no exudates. Tonsils 1/4.    NECK: Supple, non-tender  LUNGS: clear to auscultation bilaterally, no wheezes or rhonchi. Breathing is non labored. No cough during exam  CARDIO: RRR without murmur  EXTREMITIES: no cyanosis, clubbing or edema  LYMPH:  no lymphadenopathy.        ASSESSMENT AND PLAN:   Karol Packer is a 43 year old female who presents with upper respiratory symptoms that are consistent with    ASSESSMENT:   Encounter Diagnoses   Name Primary?    Viral URI     Rhinosinusitis Yes     Results for orders placed or performed in visit on 24   Rapid Covid-19    Collection Time: 24 12:37 PM    Specimen: Nares    Result Value Ref Range    Rapid SARS-CoV-2 by PCR Not Detected Not Detected    POCT Lot Number [id number]     POCT Expiration Date 8/28/25     POCT Procedure Control Control Valid Control Valid     ,102        PLAN: Meds as below.  Comfort care as described in Patient Instructions. Discussion about supportive treatment including over the counter medications, hydration and rest. Recommended to use inhaler that she has at home 2 puffs every 4 hours for cough. Prescription sent and instructed can start on Sunday if symptoms continue. Limitations of antibiotics due to medications so will send azithromycin.    Meds & Refills for this Visit:  Requested Prescriptions     Signed Prescriptions Disp Refills    azithromycin 250 MG Oral Tab 6 tablet 0     Sig: Take 2 tablets (500 mg total) by mouth daily for 1 day, THEN 1 tablet (250 mg total) daily for 4 days.     Risks, benefits, and side effects of medication explained and discussed.    The patient indicates understanding of these issues and agrees to the plan.  The patient is asked to f/u with PCP if sx's persist or worsen.

## 2024-03-18 ENCOUNTER — PATIENT MESSAGE (OUTPATIENT)
Dept: FAMILY MEDICINE CLINIC | Facility: CLINIC | Age: 44
End: 2024-03-18

## 2024-03-18 NOTE — TELEPHONE ENCOUNTER
From: Karol Packer  To: Leni Schumacher  Sent: 3/18/2024 10:12 AM CDT  Subject: Migraine Medications     Good morning!!!    I have a questions about changing my migraine medications as I have been having migraines again 2-3 times a week for about a month. I think it maybe because I am on Concerta now. Because the Concerta is the only medication I have found that really helps me I’d rather not stop that one. I was wondering if I needed to do an office visit for this or if I can just do a tele visit to talk about switching the Topirimate and Sumatriptan around to something that will work with the Concerta. Mr psych mentioned there are migraine meds that are made to work with ADHD meds I just cannot remember what type they are.    Thank you!  Tucker

## 2024-03-28 DIAGNOSIS — G43.109 MIGRAINE WITH AURA AND WITHOUT STATUS MIGRAINOSUS, NOT INTRACTABLE: ICD-10-CM

## 2024-03-28 DIAGNOSIS — Z79.899 ENCOUNTER FOR LONG-TERM CURRENT USE OF MEDICATION: ICD-10-CM

## 2024-04-01 RX ORDER — TOPIRAMATE 100 MG/1
100 TABLET, FILM COATED ORAL 2 TIMES DAILY
Qty: 180 TABLET | Refills: 1 | Status: SHIPPED | OUTPATIENT
Start: 2024-04-01

## 2024-04-01 NOTE — TELEPHONE ENCOUNTER
Requested Prescriptions     Signed Prescriptions Disp Refills    TOPIRAMATE 100 MG Oral Tab 180 tablet 1     Sig: TAKE 1 TABLET BY MOUTH TWICE A DAY     Authorizing Provider: JERRY MCNALLY     Ordering User: AMBER BURGOS     Refilled per protocol/OV notes

## 2024-04-10 ENCOUNTER — OFFICE VISIT (OUTPATIENT)
Dept: FAMILY MEDICINE CLINIC | Facility: CLINIC | Age: 44
End: 2024-04-10
Payer: COMMERCIAL

## 2024-04-10 VITALS
OXYGEN SATURATION: 98 % | TEMPERATURE: 98 F | DIASTOLIC BLOOD PRESSURE: 68 MMHG | SYSTOLIC BLOOD PRESSURE: 100 MMHG | HEART RATE: 103 BPM | RESPIRATION RATE: 16 BRPM | WEIGHT: 117 LBS | BODY MASS INDEX: 18 KG/M2

## 2024-04-10 DIAGNOSIS — Z79.899 ENCOUNTER FOR LONG-TERM CURRENT USE OF MEDICATION: ICD-10-CM

## 2024-04-10 DIAGNOSIS — G43.109 MIGRAINE WITH AURA AND WITHOUT STATUS MIGRAINOSUS, NOT INTRACTABLE: Primary | ICD-10-CM

## 2024-04-10 DIAGNOSIS — J45.40 MODERATE PERSISTENT ASTHMA WITHOUT COMPLICATION (HCC): ICD-10-CM

## 2024-04-10 PROCEDURE — 99214 OFFICE O/P EST MOD 30 MIN: CPT | Performed by: FAMILY MEDICINE

## 2024-04-10 PROCEDURE — 96127 BRIEF EMOTIONAL/BEHAV ASSMT: CPT | Performed by: FAMILY MEDICINE

## 2024-04-10 RX ORDER — TOPIRAMATE 25 MG/1
25 TABLET ORAL 2 TIMES DAILY
Qty: 60 TABLET | Refills: 0 | Status: SHIPPED | OUTPATIENT
Start: 2024-04-10

## 2024-04-10 RX ORDER — TOPIRAMATE 100 MG/1
TABLET, FILM COATED ORAL
COMMUNITY
Start: 2024-04-10

## 2024-04-10 NOTE — PATIENT INSTRUCTIONS
For migraine medications check out the following:    Nurtec    The below are biologics for migraines  Aimovig  Ajovy  Emgality    For asthma:  Symbicort  Dulera  Breo Ellipta        Weaning off the topiramate:  You are currently taking 100 mg twice a day.  First week: 50 mg in the morning and 100 mg at night  Second week: 50 mg in the morning and 50 mg at night  Third week: 25 mg in the morning and 50 mg at night  Fourth week: 25 mg in the morning and 25 mg at night  Fifth week: Only take 25 mg nightly only for a week, then stop.

## 2024-04-11 NOTE — PROGRESS NOTES
Karol Packer is a 43 year old female.     Chief Complaint   Patient presents with    Asthma    Migraine         HPI:         Migraine headaches:  Worsening.  Patient reports her psychiatrist has recommended that she be weaned off the topiramate which may be interacting with the Concerta she recently started taking, migraines have worsened since taking the Concerta.  Patient reports her psychiatrist does not think the Concerta is directly making the headaches worse but rather an interaction with the topiramate.  Her psychiatrist told her a certain type of medication for her migraines would be recommended but patient does not recall what it was, perhaps a biologic.  Patient reports the Concerta has greatly helped her ADHD.        Asthma:  Patient was doing well on Advair, however it is no longer well covered by her insurance, it would cost her $300 out-of-pocket.  Per patient her insurance will not cover Wixela.  She has been using albuterol with good relief.        Wt Readings from Last 6 Encounters:   04/10/24 117 lb (53.1 kg)   03/07/24 117 lb (53.1 kg)   02/05/24 118 lb (53.5 kg)   12/15/23 120 lb (54.4 kg)   11/06/23 125 lb 12.8 oz (57.1 kg)   08/16/23 123 lb 3.2 oz (55.9 kg)      Body mass index is 18.32 kg/m².        Current Outpatient Medications   Medication Sig Dispense Refill    topiramate 25 MG Oral Tab Take 1 tablet (25 mg total) by mouth 2 (two) times daily. 60 tablet 0    topiramate 100 MG Oral Tab Wean as directed      hydroxychloroquine 200 MG Oral Tab Take 2 tablets (400 mg total) by mouth daily. 180 tablet 2    SUMATRIPTAN 50 MG Oral Tab TAKE AT 1ST SIGN/SYMPTOM OF MIGRAINE HEADACHE. MAY REPEAT DOSE IN 2HRS FOR 1 TIME MAX 100MG IN 24HRS 9 tablet 1    CONCERTA 27 MG Oral Tab CR Take 1 tablet (27 mg total) by mouth every morning.      Methotrexate Sodium, PF, 50 MG/2ML Injection Solution Inject 0.8 mL (20 mg total) into the skin once a week. Inject 20 mg as directed 4 each 2     Syringe/Needle, Disp, (BD SAFETYGLIDE SYRINGE/NEEDLE) 27G X 5/8\" 1 ML Does not apply Misc To be used for Methotrexate injection once weekly 16 each 3    gabapentin 300 MG Oral Cap Take 2 capsules (600 mg total) by mouth nightly. 60 capsule 5    gabapentin 100 MG Oral Cap Take 1 capsule (100 mg total) by mouth nightly. At 100mg at bedtime per week as tolerated with  additional 600mg at bedtime 90 capsule 5    folic acid 1 MG Oral Tab Take 1 tablet (1 mg total) by mouth daily. 90 tablet 1    traZODone 50 MG Oral Tab Take 1 tablet (50 mg total) by mouth. 0.5-1 tablet by mouth nightly as needed      albuterol (PROAIR HFA) 108 (90 Base) MCG/ACT Inhalation Aero Soln Inhale 2 puffs into the lungs every 6 (six) hours as needed for Wheezing or Shortness of Breath (Cough). 1 each 0    montelukast 10 MG Oral Tab Take 1 tablet (10 mg total) by mouth nightly. 90 tablet 3    benztropine 1 MG Oral Tab Take 1 tablet (1 mg total) by mouth 2 (two) times daily.      BD TB SYRINGE 25G X 5/8\" 1 ML Does not apply Misc       busPIRone HCl 15 MG Oral Tab Take by mouth 2 (two) times a day.        Cholecalciferol (VITAMIN D3) 125 MCG (5000 UT) Oral Tab Take 1 tablet (5,000 Units total) by mouth daily.      loratadine 10 MG Oral Tab Take 1 tablet (10 mg total) by mouth daily.      Sertraline HCl 100 MG Oral Tab Take 2 tablets (200 mg total) by mouth daily. Take 2 tab once a day      ALPRAZolam 0.25 MG Oral Tab Take 1 tablet (0.25 mg total) by mouth daily as needed.      Fluticasone Propionate 50 MCG/ACT Nasal Suspension 2 sprays by Each Nare route daily. 1 Bottle 3      Past Medical History:    Acute bronchitis due to severe acute respiratory syndrome coronavirus 2 (SARS-CoV-2)    Asthma (HCC)    Cervical polyp    Depression    Hip pain, acute, right    In area of anterior superior iliac spine    Lupus (HCC)    Migraines    Status post bilateral salpingectomy    Unintentional weight loss    As of 7/19/2021 maintaining weight since May or June        Past Surgical History:   Procedure Laterality Date    Colonoscopy N/A 2021    Procedure: COLONOSCOPY;  Surgeon: Jose Herad DO;  Location:  ENDOSCOPY    Colposcopy, cervix inc upper/adjacent vagina      Colposcopy,loop electrd cervix excis      Conization cervix,loop electrd      Salpingectomy Bilateral 2020    Laparoscopy    Tonsillectomy        Social History:    Social History     Socioeconomic History    Marital status: Single   Tobacco Use    Smoking status: Former     Current packs/day: 0.00     Average packs/day: 0.5 packs/day for 24.0 years (12.0 ttl pk-yrs)     Types: Cigarettes     Start date: 1995     Quit date: 2019     Years since quittin.2    Smokeless tobacco: Never   Vaping Use    Vaping status: Never Used   Substance and Sexual Activity    Alcohol use: Yes     Comment: social    Drug use: No    Sexual activity: Not Currently   Other Topics Concern    Caffeine Concern Yes     Comment: 2 cups of coffee daily    Exercise No     Comment: Daily walking    Seat Belt Yes         Family History   Problem Relation Age of Onset    Other (acute MI[other]) Maternal Grandfather     Heart Disorder Maternal Grandfather     Other (HTN[other]) Maternal Grandmother     Other (HTN[other]) Mother     Other (hypothyroidism[other]) Mother     Breast Cancer Mother         63 years ols    Stroke Father         59 years old     REVIEW OF SYSTEMS:   GENERAL HEALTH: Overall feels okay if it were not for the headaches  SKIN: denies any unusual skin lesions or rashes   RESPIRATORY: As in HPI  CARDIOVASCULAR: Denies CP/palpitations  VASCULAR: Denies LE edema  GI: No GI complaints  : denies urinary symptoms  NEURO: As in HPI  PSYCH: denies SI/HI    Immunization History   Administered Date(s) Administered    Covid-19 Vaccine Pfizer 30 mcg/0.3 ml 2021, 2021, 10/21/2021    DTP 1980, 1980, 1981, 1982    FLULAVAL 6 months & older 0.5 ml Prefilled syringe  ([id number]) 11/05/2020, 12/07/2021    Influenza 11/05/2020, 12/07/2021, 03/24/2023    MMR 03/03/1982    OPV 09/04/1980, 12/26/1980, 05/05/1982    Pneumococcal Conjugate PCV20 08/16/2023    TDAP 04/02/2018, 05/15/2021   Deferred Date(s) Deferred    Influenza Vaccine Refused 09/23/2020       EXAM:   /68   Pulse 103   Temp 98 °F (36.7 °C) (Temporal)   Resp 16   Wt 117 lb (53.1 kg)   LMP 04/10/2024 (Exact Date)   SpO2 98%   BMI 18.32 kg/m²   GENERAL: NAD, pleasant not acutely ill-appearing  female  SKIN: no visible rashes  HEAD: NCAT  EXTREMITIES: no cyanosis or clubbing  NEURO: Alert and Oriented x3.  No gross motor or gross sensory abnormalities.  PSYCH: Affect normal.  Normal thought content.        ASSESSMENT AND PLAN:       Encounter Diagnoses   Name Primary?    Migraine with aura and without status migrainosus, not intractable Yes    Moderate persistent asthma without complication (HCC)     Encounter for long-term current use of medication          1. Migraine with aura and without status migrainosus, not intractable  Patient reports her psychiatrist has recommended that she be weaned off the topiramate which may be interacting with the Concerta she recently started taking, migraines have worsened since taking the Concerta.  We will have patient wean off the topiramate.  She will check with her insurance to see what medications are covered, then call to let me know.  Patient most likely need to stop taking the sumatriptan when the new medication is initiated, however patient can continue to take and wean off the topiramate in the meantime.  Follow-up in 2 months.    Weaning off the topiramate:  You are currently taking 100 mg twice a day.  First week: 50 mg in the morning and 100 mg at night  Second week: 50 mg in the morning and 50 mg at night  Third week: 25 mg in the morning and 50 mg at night  Fourth week: 25 mg in the morning and 25 mg at night  Fifth week: Only take 25 mg nightly only for a  week, then stop.    For migraine medications check out the following:  Nurtec  The below are biologics for migraines  Aimovig  Ajovy  Emgality    - topiramate 25 MG Oral Tab; Take 1 tablet (25 mg total) by mouth 2 (two) times daily.  Dispense: 60 tablet; Refill: 0    2. Moderate persistent asthma without complication (HCC)  Patient was doing well on Advair, however it is no longer well covered by her insurance, it would cost her $300 out-of-pocket.  She will check to see if her insurance covers Symbicort, Dulera or Breo Ellipta.  She did tell me that her insurance will not cover Wixela.  Continue albuterol as needed in the meantime.  Follow-up in approximately 2 months.    Symbicort  Dulera  Breo Ellipta    3. Encounter for long-term current use of medication  Medication use, risks, benefits, side effects and precautions discussed, patient verbalizes understanding. Questions encouraged and answered to patient's satisfaction.    - topiramate 100 MG Oral Tab; Wean as directed          Return in about 2 months (around 6/10/2024) for Migraines and asthma.

## 2024-04-13 DIAGNOSIS — G43.109 MIGRAINE WITH AURA AND WITHOUT STATUS MIGRAINOSUS, NOT INTRACTABLE: ICD-10-CM

## 2024-04-13 DIAGNOSIS — Z79.899 ENCOUNTER FOR LONG-TERM CURRENT USE OF MEDICATION: ICD-10-CM

## 2024-04-15 ENCOUNTER — TELEPHONE (OUTPATIENT)
Dept: FAMILY MEDICINE CLINIC | Facility: CLINIC | Age: 44
End: 2024-04-15

## 2024-04-15 DIAGNOSIS — G43.109 MIGRAINE WITH AURA AND WITHOUT STATUS MIGRAINOSUS, NOT INTRACTABLE: ICD-10-CM

## 2024-04-15 DIAGNOSIS — Z76.89 ENCOUNTER FOR NEW MEDICATION PRESCRIPTION: Primary | ICD-10-CM

## 2024-04-15 RX ORDER — SUMATRIPTAN 50 MG/1
TABLET, FILM COATED ORAL
Qty: 9 TABLET | Refills: 1 | OUTPATIENT
Start: 2024-04-15

## 2024-04-15 RX ORDER — FREMANEZUMAB-VFRM 225 MG/1.5ML
225 INJECTION SUBCUTANEOUS
Qty: 1.5 ML | Refills: 2 | Status: SHIPPED | OUTPATIENT
Start: 2024-04-15 | End: 2024-04-17

## 2024-04-15 NOTE — TELEPHONE ENCOUNTER
Spoke w/ pt regarding medications for her migraines that was discussed at her 4/10 OV. She spoke with her psychiatrist and they recommended Amovig or Ajovy for preventative and Nurtec of Ubrelvy to treat. These meds would work with her Concerta. Please advise, thanks.

## 2024-04-15 NOTE — TELEPHONE ENCOUNTER
Patient calling and would like to speak to nurse or Dr. Leni Schumacher regarding her Migraine Medication that she needs to be on after speaking with her Phycologist.   Please advise.

## 2024-04-16 ENCOUNTER — PATIENT MESSAGE (OUTPATIENT)
Dept: FAMILY MEDICINE CLINIC | Facility: CLINIC | Age: 44
End: 2024-04-16

## 2024-04-16 ENCOUNTER — TELEPHONE (OUTPATIENT)
Dept: FAMILY MEDICINE CLINIC | Facility: CLINIC | Age: 44
End: 2024-04-16

## 2024-04-16 NOTE — TELEPHONE ENCOUNTER
Spoke w/pt and gave all information. She will keep us up to date. Patient verbalized agreement and understanding.

## 2024-04-16 NOTE — TELEPHONE ENCOUNTER
Prescription sent for Ajovy which is a once a month injection.  Recommend start with the Ajovy prescription to see if it is covered before adding on medication such as Nurtec for abortive measures.  If Ajovy is not covered, we may prescribe Nurtec every other day as prophylaxis instead of for abortive measures.

## 2024-04-17 ENCOUNTER — TELEPHONE (OUTPATIENT)
Dept: FAMILY MEDICINE CLINIC | Facility: CLINIC | Age: 44
End: 2024-04-17

## 2024-04-17 DIAGNOSIS — G43.109 MIGRAINE WITH AURA AND WITHOUT STATUS MIGRAINOSUS, NOT INTRACTABLE: Primary | ICD-10-CM

## 2024-04-17 DIAGNOSIS — Z76.89 ENCOUNTER FOR NEW MEDICATION PRESCRIPTION: ICD-10-CM

## 2024-04-17 RX ORDER — RIMEGEPANT SULFATE 75 MG/75MG
75 TABLET, ORALLY DISINTEGRATING ORAL EVERY OTHER DAY
Qty: 15 TABLET | Refills: 0 | Status: SHIPPED | OUTPATIENT
Start: 2024-04-17 | End: 2024-06-03

## 2024-04-17 NOTE — TELEPHONE ENCOUNTER
PA for Ajovy was denied by insurance. Determination details should be coming via fax.     Denied   4/17/2024  9:38 AM  Appeal supported: No Appeal instructions: Appeals are not supported through ePA. Please refer to the fax case notice for appeals information and instructions.   Note from payer: Request Reference Number: [id number].  JAIRO        /1.5 is denied for not meeting the prior authorization requirement(s).  Details of this decision are in the notice attached below or have been faxed to you.   Payer: [employer] Part D Case ID: [id number]    [phone]    [phone]

## 2024-04-17 NOTE — TELEPHONE ENCOUNTER
Nurtec PA was denied.  Did receive fax from [employer] in regards to Ajovy stating that the drug is covered when meets the following:  Have tried amitriptyline or venlafaxine for at least 2 months, or can not use.   Have tried one beta blocker for at least 2 months, or can not use.  Have tried candesartan for at least 2 months or can not use.

## 2024-04-17 NOTE — TELEPHONE ENCOUNTER
From: Karol Packer  To: Leni Schumacher  Sent: 4/16/2024 6:50 PM CDT  Subject: JAIRO Angelo Evening/Morning,    I just wanted to let you know the insurance is requiring a pre-auth for this prescription. The pharmacy said they reached out to you so I just wanted to be sure they have.    Thank you!  Tucker Packer

## 2024-04-17 NOTE — TELEPHONE ENCOUNTER
Please call patient's insurance regarding prior authorization:  1.  Patient has contraindication to taking both Elavil//amitriptyline and Effexor/venlafaxine because patient is already taking buspirone, sertraline, and trazodone.  2.  Patient cannot take a beta-blocker because she has asthma and she has depression, beta-blockers are not recommended for patients who have depression and/or asthma, additionally patient has low normal blood pressure.  3.  This patient should not be prescribed candesartan for multiple reasons, 1 issue is that patient has low normal blood pressure.  Additionally, candesartan is not FDA approved to treat patients for migraine prophylaxis, in some rare cases it has been used off label for migraine prophylaxis, candesartan is not appropriate for this patient.

## 2024-04-17 NOTE — TELEPHONE ENCOUNTER
payer: Request Reference Number: [id number].  NURTEC       TAB 75MG ODT is denied for not meeting the prior authorization requirement(s).  Details of this decision are in the notice attached below or have been faxed to you.  Payer: [employer] Part D Case ID: [id number]    [phone]    [phone]  Electronic appeal: Not supported  Appeal instructions: Appeals are not supported through ePA. Please refer to the fax case notice for appeals information and instructions.  View History    Nurtec denied.  Any further instruction?

## 2024-04-17 NOTE — TELEPHONE ENCOUNTER
Please inform patient that insurance denied the Ajovy.    A new prescription was sent for Nurtec to take every other day, unsure if insurance will cover or not, we will have to wait to see.  Please inform patient that if insurance covers the Nurtec and she is able to start taking it she should not take the sumatriptan.

## 2024-04-18 NOTE — TELEPHONE ENCOUNTER
[facility] decision [facility] [phone]

## 2024-04-22 DIAGNOSIS — M79.7 FIBROMYALGIA: Primary | ICD-10-CM

## 2024-04-22 RX ORDER — GABAPENTIN 100 MG/1
CAPSULE ORAL
Qty: 180 CAPSULE | Refills: 5 | Status: SHIPPED | OUTPATIENT
Start: 2024-04-22

## 2024-04-22 NOTE — TELEPHONE ENCOUNTER
LOV:   02/05/2024      Future Appointments   Date Time Provider Department Center   8/5/2024 10:20 AM Adriane Blanton MD [id number] [address]       LF:  02/05/2024    QTY:90    Refills:   5    Patient is taking 200 mg at night for a total of 800 mg nightly. Can we please send in a new script to reflect this.

## 2024-05-06 DIAGNOSIS — M06.09 RHEUMATOID ARTHRITIS OF MULTIPLE SITES WITHOUT RHEUMATOID FACTOR (HCC): ICD-10-CM

## 2024-05-07 RX ORDER — METHOTREXATE 25 MG/ML
20 INJECTION INTRA-ARTERIAL; INTRAMUSCULAR; INTRATHECAL; INTRAVENOUS WEEKLY
Qty: 4 EACH | Refills: 2 | OUTPATIENT
Start: 2024-05-07

## 2024-05-07 RX ORDER — NEEDLES, SAFETY 22GX1 1/2"
NEEDLE, DISPOSABLE MISCELLANEOUS
Qty: 16 EACH | Refills: 3 | OUTPATIENT
Start: 2024-05-07

## 2024-05-10 ENCOUNTER — LAB ENCOUNTER (OUTPATIENT)
Dept: LAB | Age: 44
End: 2024-05-10
Attending: INTERNAL MEDICINE
Payer: COMMERCIAL

## 2024-05-10 DIAGNOSIS — M81.8 OTHER OSTEOPOROSIS WITHOUT CURRENT PATHOLOGICAL FRACTURE: ICD-10-CM

## 2024-05-10 DIAGNOSIS — M19.90 OSTEOARTHRITIS, UNSPECIFIED OSTEOARTHRITIS TYPE, UNSPECIFIED SITE: ICD-10-CM

## 2024-05-10 DIAGNOSIS — M06.09 RHEUMATOID ARTHRITIS OF MULTIPLE SITES WITHOUT RHEUMATOID FACTOR (HCC): ICD-10-CM

## 2024-05-10 LAB
ALBUMIN SERPL-MCNC: 4.1 G/DL (ref 3.4–5)
ALBUMIN/GLOB SERPL: 1.6 {RATIO} (ref 1–2)
ALP LIVER SERPL-CCNC: 62 U/L
ALT SERPL-CCNC: 18 U/L
ANION GAP SERPL CALC-SCNC: 3 MMOL/L (ref 0–18)
AST SERPL-CCNC: 12 U/L (ref 15–37)
BASOPHILS # BLD AUTO: 0.03 X10(3) UL (ref 0–0.2)
BASOPHILS NFR BLD AUTO: 0.4 %
BILIRUB SERPL-MCNC: 0.4 MG/DL (ref 0.1–2)
BUN BLD-MCNC: 14 MG/DL (ref 9–23)
CALCIUM BLD-MCNC: 9.4 MG/DL (ref 8.5–10.1)
CHLORIDE SERPL-SCNC: 111 MMOL/L (ref 98–112)
CO2 SERPL-SCNC: 27 MMOL/L (ref 21–32)
CREAT BLD-MCNC: 0.64 MG/DL
EGFRCR SERPLBLD CKD-EPI 2021: 112 ML/MIN/1.73M2 (ref 60–?)
EOSINOPHIL # BLD AUTO: 0.33 X10(3) UL (ref 0–0.7)
EOSINOPHIL NFR BLD AUTO: 4.9 %
ERYTHROCYTE [DISTWIDTH] IN BLOOD BY AUTOMATED COUNT: 14.4 %
ERYTHROCYTE [SEDIMENTATION RATE] IN BLOOD: 3 MM/HR
FASTING STATUS PATIENT QL REPORTED: NO
GLOBULIN PLAS-MCNC: 2.5 G/DL (ref 2.8–4.4)
GLUCOSE BLD-MCNC: 96 MG/DL (ref 70–99)
HCT VFR BLD AUTO: 39.3 %
HGB BLD-MCNC: 12.9 G/DL
IMM GRANULOCYTES # BLD AUTO: 0.02 X10(3) UL (ref 0–1)
IMM GRANULOCYTES NFR BLD: 0.3 %
LYMPHOCYTES # BLD AUTO: 2.5 X10(3) UL (ref 1–4)
LYMPHOCYTES NFR BLD AUTO: 37 %
MCH RBC QN AUTO: 33.6 PG (ref 26–34)
MCHC RBC AUTO-ENTMCNC: 32.8 G/DL (ref 31–37)
MCV RBC AUTO: 102.3 FL
MONOCYTES # BLD AUTO: 0.56 X10(3) UL (ref 0.1–1)
MONOCYTES NFR BLD AUTO: 8.3 %
NEUTROPHILS # BLD AUTO: 3.31 X10 (3) UL (ref 1.5–7.7)
NEUTROPHILS # BLD AUTO: 3.31 X10(3) UL (ref 1.5–7.7)
NEUTROPHILS NFR BLD AUTO: 49.1 %
OSMOLALITY SERPL CALC.SUM OF ELEC: 292 MOSM/KG (ref 275–295)
PLATELET # BLD AUTO: 274 10(3)UL (ref 150–450)
POTASSIUM SERPL-SCNC: 3.8 MMOL/L (ref 3.5–5.1)
PROT SERPL-MCNC: 6.6 G/DL (ref 6.4–8.2)
RBC # BLD AUTO: 3.84 X10(6)UL
SODIUM SERPL-SCNC: 141 MMOL/L (ref 136–145)
WBC # BLD AUTO: 6.8 X10(3) UL (ref 4–11)

## 2024-05-10 PROCEDURE — 36415 COLL VENOUS BLD VENIPUNCTURE: CPT

## 2024-05-10 PROCEDURE — 86225 DNA ANTIBODY NATIVE: CPT

## 2024-05-10 PROCEDURE — 80053 COMPREHEN METABOLIC PANEL: CPT

## 2024-05-10 PROCEDURE — 85652 RBC SED RATE AUTOMATED: CPT

## 2024-05-10 PROCEDURE — 85025 COMPLETE CBC W/AUTO DIFF WBC: CPT

## 2024-05-13 DIAGNOSIS — M06.09 RHEUMATOID ARTHRITIS OF MULTIPLE SITES WITHOUT RHEUMATOID FACTOR (HCC): ICD-10-CM

## 2024-05-13 LAB — DSDNA IGG SERPL IA-ACNC: 3.3 IU/ML

## 2024-05-13 RX ORDER — NEEDLES, SAFETY 22GX1 1/2"
NEEDLE, DISPOSABLE MISCELLANEOUS
Qty: 16 EACH | Refills: 3 | Status: SHIPPED | OUTPATIENT
Start: 2024-05-13

## 2024-05-13 RX ORDER — METHOTREXATE 25 MG/ML
20 INJECTION INTRA-ARTERIAL; INTRAMUSCULAR; INTRATHECAL; INTRAVENOUS WEEKLY
Qty: 4 EACH | Refills: 2 | Status: SHIPPED | OUTPATIENT
Start: 2024-05-13

## 2024-05-13 NOTE — TELEPHONE ENCOUNTER
LOV: 02/05/2024  Future Appointments   Date Time Provider Department Center   8/5/2024 10:20 AM Adriane Blanton MD [id number] [address]   LABS:  Anti-DsDNA in process    Component      Latest Ref Rng 5/10/2024   WBC      4.0 - 11.0 x10(3) uL 6.8    RBC      3.80 - 5.30 x10(6)uL 3.84    Hemoglobin      12.0 - 16.0 g/dL 12.9    Hematocrit      35.0 - 48.0 % 39.3    Platelet Count      150.0 - 450.0 10(3)uL 274.0    MCV      80.0 - 100.0 fL 102.3 (H)    MCH      26.0 - 34.0 pg 33.6    MCHC      31.0 - 37.0 g/dL 32.8    RDW      % 14.4    Prelim Neutrophil Abs      1.50 - 7.70 x10 (3) uL 3.31    Neutrophils Absolute      1.50 - 7.70 x10(3) uL 3.31    Lymphocytes Absolute      1.00 - 4.00 x10(3) uL 2.50    Monocytes Absolute      0.10 - 1.00 x10(3) uL 0.56    Eosinophils Absolute      0.00 - 0.70 x10(3) uL 0.33    Basophils Absolute      0.00 - 0.20 x10(3) uL 0.03    Immature Granulocyte Absolute      0.00 - 1.00 x10(3) uL 0.02    Neutrophils %      % 49.1    Lymphocytes %      % 37.0    Monocytes %      % 8.3    Eosinophils %      % 4.9    Basophils %      % 0.4    Immature Granulocyte %      % 0.3    Glucose      70 - 99 mg/dL 96    Sodium      136 - 145 mmol/L 141    Potassium      3.5 - 5.1 mmol/L 3.8    Chloride      98 - 112 mmol/L 111    Carbon Dioxide, Total      21.0 - 32.0 mmol/L 27.0    ANION GAP      0 - 18 mmol/L 3    BUN      9 - 23 mg/dL 14    CREATININE      0.55 - 1.02 mg/dL 0.64    CALCIUM      8.5 - 10.1 mg/dL 9.4    CALCULATED OSMOLALITY      275 - 295 mOsm/kg 292    EGFR      >=60 mL/min/1.73m2 112    AST (SGOT)      15 - 37 U/L 12 (L)    ALT (SGPT)      13 - 56 U/L 18    ALKALINE PHOSPHATASE      37 - 98 U/L 62    Total Bilirubin      0.1 - 2.0 mg/dL 0.4    PROTEIN, TOTAL      6.4 - 8.2 g/dL 6.6    Albumin      3.4 - 5.0 g/dL 4.1    Globulin      2.8 - 4.4 g/dL 2.5 (L)    A/G Ratio      1.0 - 2.0  1.6    Patient Fasting for CMP? No    SED RATE      0 - 20 mm/Hr 3       Legend:  (H) High  (L)  Low

## 2024-05-14 DIAGNOSIS — J30.89 PERENNIAL ALLERGIC RHINITIS WITH SEASONAL VARIATION: ICD-10-CM

## 2024-05-14 DIAGNOSIS — J30.2 PERENNIAL ALLERGIC RHINITIS WITH SEASONAL VARIATION: ICD-10-CM

## 2024-05-14 DIAGNOSIS — Z79.899 ENCOUNTER FOR LONG-TERM CURRENT USE OF MEDICATION: ICD-10-CM

## 2024-05-14 DIAGNOSIS — J45.40 MODERATE PERSISTENT ASTHMA WITHOUT COMPLICATION (HCC): ICD-10-CM

## 2024-05-14 RX ORDER — MONTELUKAST SODIUM 10 MG/1
10 TABLET ORAL NIGHTLY
Qty: 90 TABLET | Refills: 0 | Status: SHIPPED | OUTPATIENT
Start: 2024-05-14

## 2024-05-14 NOTE — TELEPHONE ENCOUNTER
Requested Prescriptions     Signed Prescriptions Disp Refills    montelukast 10 MG Oral Tab 90 tablet 0     Sig: TAKE 1 TABLET BY MOUTH EVERY DAY AT NIGHT     Authorizing Provider: JERRY MCNALLY     Ordering User: AMBER BURGOS     Refilled per protocol/OV notes

## 2024-06-03 ENCOUNTER — OFFICE VISIT (OUTPATIENT)
Dept: FAMILY MEDICINE CLINIC | Facility: CLINIC | Age: 44
End: 2024-06-03
Payer: COMMERCIAL

## 2024-06-03 VITALS
HEIGHT: 67 IN | DIASTOLIC BLOOD PRESSURE: 58 MMHG | SYSTOLIC BLOOD PRESSURE: 114 MMHG | RESPIRATION RATE: 16 BRPM | WEIGHT: 126 LBS | OXYGEN SATURATION: 98 % | HEART RATE: 104 BPM | BODY MASS INDEX: 19.78 KG/M2 | TEMPERATURE: 98 F

## 2024-06-03 DIAGNOSIS — Z12.31 ENCOUNTER FOR SCREENING MAMMOGRAM FOR MALIGNANT NEOPLASM OF BREAST: ICD-10-CM

## 2024-06-03 DIAGNOSIS — M25.551 PAIN OF RIGHT HIP: ICD-10-CM

## 2024-06-03 DIAGNOSIS — G43.719 INTRACTABLE CHRONIC MIGRAINE WITHOUT AURA AND WITHOUT STATUS MIGRAINOSUS: Primary | ICD-10-CM

## 2024-06-03 PROCEDURE — 99214 OFFICE O/P EST MOD 30 MIN: CPT | Performed by: FAMILY MEDICINE

## 2024-06-03 RX ORDER — CYCLOBENZAPRINE HCL 10 MG
TABLET ORAL 2 TIMES DAILY PRN
Qty: 30 TABLET | Refills: 0 | Status: SHIPPED | OUTPATIENT
Start: 2024-06-03

## 2024-06-03 RX ORDER — ATOMOXETINE 40 MG/1
40 CAPSULE ORAL NIGHTLY
COMMUNITY
Start: 2024-05-20

## 2024-06-04 NOTE — PROGRESS NOTES
Karol Packer is a 43 year old female.     Chief Complaint   Patient presents with    Hip Pain     C/o R past 6 months    Migraine         HPI:       Migraines:  Complains of approximately 16 migraine headaches per month, even before the weaning of the topiramate.  Patient is being weaned off the topiramate at the request of her psychiatrist due to possible interaction with Concerta, topiramate last dose will be in 5 days.        Hip pain:  Right.  Worsening.  Went to PT in the past and helped for about a week.  Burns and is painful.  Can't lay on her right side.  Worse if walking or standing too long.  At worst is up to 10/10.  Radiates to right anterior thigh is where she points.  Feels back is starting to hurt because of compensating with sitting awkwardly or in unusual positions to help the hip pain.      URI:  Patient reports that she is getting over a URI that started about a week ago.        Wt Readings from Last 6 Encounters:   06/03/24 126 lb (57.2 kg)   04/10/24 117 lb (53.1 kg)   03/07/24 117 lb (53.1 kg)   02/05/24 118 lb (53.5 kg)   12/15/23 120 lb (54.4 kg)   11/06/23 125 lb 12.8 oz (57.1 kg)      Body mass index is 19.73 kg/m².        Current Outpatient Medications   Medication Sig Dispense Refill    atomoxetine 40 MG Oral Cap Take 1 capsule (40 mg total) by mouth nightly. TAKE AT BEDTIME      cyclobenzaprine 10 MG Oral Tab Take 0.5-1 tablets (5-10 mg total) by mouth 2 (two) times daily as needed for Muscle spasms. 30 tablet 0    montelukast 10 MG Oral Tab TAKE 1 TABLET BY MOUTH EVERY DAY AT NIGHT 90 tablet 0    Methotrexate Sodium, PF, 50 MG/2ML Injection Solution Inject 0.8 mL (20 mg total) into the skin once a week. Inject 20 mg as directed 4 each 2    Syringe/Needle, Disp, (BD SAFETYGLIDE SYRINGE/NEEDLE) 27G X 5/8\" 1 ML Does not apply Misc To be used for Methotrexate injection once weekly 16 each 3    gabapentin 100 MG Oral Cap Take 2 capsules at bedtime for a total of 800 mg nightly. 180  capsule 5    hydroxychloroquine 200 MG Oral Tab Take 2 tablets (400 mg total) by mouth daily. 180 tablet 2    SUMATRIPTAN 50 MG Oral Tab TAKE AT 1ST SIGN/SYMPTOM OF MIGRAINE HEADACHE. MAY REPEAT DOSE IN 2HRS FOR 1 TIME MAX 100MG IN 24HRS 9 tablet 1    gabapentin 300 MG Oral Cap Take 2 capsules (600 mg total) by mouth nightly. 60 capsule 5    folic acid 1 MG Oral Tab Take 1 tablet (1 mg total) by mouth daily. 90 tablet 1    traZODone 50 MG Oral Tab Take 1 tablet (50 mg total) by mouth. 0.5-1 tablet by mouth nightly as needed      albuterol (PROAIR HFA) 108 (90 Base) MCG/ACT Inhalation Aero Soln Inhale 2 puffs into the lungs every 6 (six) hours as needed for Wheezing or Shortness of Breath (Cough). 1 each 0    benztropine 1 MG Oral Tab Take 1 tablet (1 mg total) by mouth 2 (two) times daily.      BD TB SYRINGE 25G X 5/8\" 1 ML Does not apply Misc       busPIRone HCl 15 MG Oral Tab Take by mouth 2 (two) times a day.        Cholecalciferol (VITAMIN D3) 125 MCG (5000 UT) Oral Tab Take 1 tablet (5,000 Units total) by mouth daily.      loratadine 10 MG Oral Tab Take 1 tablet (10 mg total) by mouth daily.      Sertraline HCl 100 MG Oral Tab Take 2 tablets (200 mg total) by mouth daily. Take 2 tab once a day      ALPRAZolam 0.25 MG Oral Tab Take 1 tablet (0.25 mg total) by mouth daily as needed.      Fluticasone Propionate 50 MCG/ACT Nasal Suspension 2 sprays by Each Nare route daily. 1 Bottle 3      Past Medical History:    Acute bronchitis due to severe acute respiratory syndrome coronavirus 2 (SARS-CoV-2)    Asthma (HCC)    Cervical polyp    Depression    Hip pain, acute, right    In area of anterior superior iliac spine    Lupus (HCC)    Migraines    Status post bilateral salpingectomy    Unintentional weight loss    As of 7/19/2021 maintaining weight since May or June 2021      Past Surgical History:   Procedure Laterality Date    Colonoscopy N/A 6/2/2021    Procedure: COLONOSCOPY;  Surgeon: Jose Heard DO;   Location:  ENDOSCOPY    Colposcopy, cervix inc upper/adjacent vagina      Colposcopy,loop electrd cervix excis      Conization cervix,loop electrd      Salpingectomy Bilateral 2020    Laparoscopy    Tonsillectomy        Social History:    Social History     Socioeconomic History    Marital status: Single   Tobacco Use    Smoking status: Former     Current packs/day: 0.00     Average packs/day: 0.5 packs/day for 24.0 years (12.0 ttl pk-yrs)     Types: Cigarettes     Start date: 1995     Quit date: 2019     Years since quittin.4    Smokeless tobacco: Never   Vaping Use    Vaping status: Never Used   Substance and Sexual Activity    Alcohol use: Yes     Comment: social    Drug use: No    Sexual activity: Not Currently   Other Topics Concern    Caffeine Concern Yes     Comment: 2 cups of coffee daily    Exercise No     Comment: Daily walking    Seat Belt Yes         Family History   Problem Relation Age of Onset    Other (acute MI[other]) Maternal Grandfather     Heart Disorder Maternal Grandfather     Other (HTN[other]) Maternal Grandmother     Other (HTN[other]) Mother     Other (hypothyroidism[other]) Mother     Breast Cancer Mother         63 years ols    Stroke Father         59 years old     REVIEW OF SYSTEMS:   GENERAL HEALTH: Overall feels okay  RESPIRATORY: Denies: MEL/PALMA  CARDIOVASCULAR: Denies CP/palpitations  VASCULAR: Denies LE edema  GI: Denies abdominal pain/nausea/vomiting/diarrhea  : denies urinary symptoms  NEURO: denies change in vision  PSYCH: Patient under the care of a psychiatrist    Immunization History   Administered Date(s) Administered    Covid-19 Vaccine Pfizer 30 mcg/0.3 ml 2021, 2021, 10/21/2021    DTP 1980, 1980, 1981, 1982    FLULAVAL 6 months & older 0.5 ml Prefilled syringe (89985) 2020, 2021    Influenza 2020, 2021, 2023    MMR 1982    OPV 1980, 1980, 1982    Pneumococcal  Conjugate PCV20 08/16/2023    TDAP 04/02/2018, 05/15/2021   Deferred Date(s) Deferred    Influenza Vaccine Refused 09/23/2020       EXAM:   /58   Pulse 104   Temp 98.1 °F (36.7 °C) (Temporal)   Resp 16   Ht 5' 7\" (1.702 m)   Wt 126 lb (57.2 kg)   LMP 05/22/2024 (Exact Date)   SpO2 98%   BMI 19.73 kg/m²   GENERAL: NAD, pleasant not acutely ill-appearing  female  SKIN: no visible rashes  HEAD: NCAT  VASCULAR: No lower extremity edema  Musculoskeletal: Pain to palpation anterior superior iliac spine and anterior inferior iliac spine, this reproduces the pain.  EXTREMITIES: no cyanosis or clubbing  NEURO: Alert and Oriented x3.  No gross motor or gross sensory abnormalities.  PSYCH: Affect normal.  Normal thought content.          ASSESSMENT AND PLAN:       Encounter Diagnoses   Name Primary?    Intractable chronic migraine without aura and without status migrainosus Yes    Pain of right hip     Encounter for screening mammogram for malignant neoplasm of breast          1. Intractable chronic migraine without aura and without status migrainosus  Patient's last dose with weaning of topiramate will be in approximately 5 days.  We tried prescribing Ajovy and Nurtec and prior authorization was denied, however I suspect the prior authorization questions were not answered correctly.  Patient referred to Dr. Cameron, neurologist, for further evaluation and care.    - Neuro Referral - In Network    2. Pain of right hip  Etiology uncertain, suspect some involvement of ASIS and AIIS.  X-ray of pelvis ordered and pending.  Trial of muscle relaxant cyclobenzaprine.  Patient referred to Dr. Luther, sports med, for further evaluation and care.    - XR HIP + PELVIS MIN 4 VIEWS RIGHT (CPT=73503); Future  - Ortho Referral - In Network  - cyclobenzaprine 10 MG Oral Tab; Take 0.5-1 tablets (5-10 mg total) by mouth 2 (two) times daily as needed for Muscle spasms.  Dispense: 30 tablet; Refill: 0          Meds &  Refills for this Visit:  Requested Prescriptions     Signed Prescriptions Disp Refills    cyclobenzaprine 10 MG Oral Tab 30 tablet 0     Sig: Take 0.5-1 tablets (5-10 mg total) by mouth 2 (two) times daily as needed for Muscle spasms.       Imaging & Consults:  NEURO - INTERNAL  ORTHOPEDIC - INTERNAL  XR HIP + PELVIS MIN 4 VIEWS RIGHT (CPT=73503)  [facility] GANGA 2D+3D SCREENING BILAT (CPT=77067/99664)    Return in about 2 months (around 8/3/2024) for Annual wellness visit.  Sooner if needed..

## 2024-06-08 ENCOUNTER — HOSPITAL ENCOUNTER (OUTPATIENT)
Dept: GENERAL RADIOLOGY | Age: 44
Discharge: HOME | End: 2024-06-08
Attending: FAMILY MEDICINE
Payer: COMMERCIAL

## 2024-06-08 DIAGNOSIS — M25.551 PAIN OF RIGHT HIP: ICD-10-CM

## 2024-06-08 PROCEDURE — 73502 X-RAY EXAM HIP UNI 2-3 VIEWS: CPT | Performed by: FAMILY MEDICINE

## 2024-06-15 ENCOUNTER — HOSPITAL ENCOUNTER (OUTPATIENT)
Dept: MAMMOGRAPHY | Age: 44
Discharge: HOME | End: 2024-06-15
Attending: FAMILY MEDICINE

## 2024-06-15 DIAGNOSIS — Z12.31 ENCOUNTER FOR SCREENING MAMMOGRAM FOR MALIGNANT NEOPLASM OF BREAST: ICD-10-CM

## 2024-06-15 PROCEDURE — 77067 SCR MAMMO BI INCL CAD: CPT | Performed by: FAMILY MEDICINE

## 2024-06-15 PROCEDURE — 77063 BREAST TOMOSYNTHESIS BI: CPT | Performed by: FAMILY MEDICINE

## 2024-06-19 ENCOUNTER — OFFICE VISIT (OUTPATIENT)
Facility: CLINIC | Age: 44
End: 2024-06-19

## 2024-06-19 VITALS — HEIGHT: 66 IN | BODY MASS INDEX: 20.25 KG/M2 | WEIGHT: 126 LBS

## 2024-06-19 DIAGNOSIS — M79.7 FIBROMYALGIA: ICD-10-CM

## 2024-06-19 DIAGNOSIS — M25.551 GREATER TROCHANTERIC PAIN SYNDROME OF RIGHT LOWER EXTREMITY: Primary | ICD-10-CM

## 2024-06-19 PROCEDURE — 99204 OFFICE O/P NEW MOD 45 MIN: CPT | Performed by: FAMILY MEDICINE

## 2024-06-19 RX ORDER — METHOCARBAMOL 750 MG/1
750 TABLET, FILM COATED ORAL NIGHTLY
Qty: 20 TABLET | Refills: 0 | Status: SHIPPED | OUTPATIENT
Start: 2024-06-19 | End: 2024-07-09

## 2024-06-19 RX ORDER — DICLOFENAC SODIUM 75 MG/1
75 TABLET, DELAYED RELEASE ORAL 2 TIMES DAILY
Qty: 28 TABLET | Refills: 1 | Status: SHIPPED | OUTPATIENT
Start: 2024-06-19

## 2024-06-19 NOTE — H&P
Sports Medicine Clinic Note     Subjective:    Chief Complaint   Patient presents with    Hip Pain     New patient right hip pain;  Onset - have lupus, pain in right hip since 2022, completed PT but hurt throughout, definitely worse now     History of Present Illness: This is a 43 year old who presents with complaints of right lateral hip pain that has been gradually worsening over the past 2 years. Patient denies any recent trauma but notes that the pain is exacerbated with prolonged sitting, standing and walking. Patient has attempted self-management with OTC analgesics, trial of flexeril and rest but has had minimal relief. Pain is described as a dull ache localized over the greater trochanter, with occasional radiation down the lateral thigh and into the groin. She has a history of Lupus and Fibromyalgia which complicate her pain which does seem to migrate. She states she does have back pain, hand pain and other joint pain as well. The pain interferes with sleep and daily activities, including their job that requires prolonged sitting. Primarily goal today is pain control. She has tried PT focused on hip stabilization with no improvement in symptoms.    Objective:    Right Hip Examination:    Inspection  Non-antalgic gait  Skin without erythema, breakdown or rash  No gross leg length discrepancy appreciated    Palpation  Tenderness elicited over the greater trochanteric region and anterior joint line over the inguinal fold.    ROM  Painless passive ROM: IR 35*/ER45*/Flexion 135*/Abduction 45*/ Adduction 25*    Neurovascular  2+ dp/pt pulses  SILT intact in Fem/Tib/Saphenous/Sural/Deep & Superficial Peroneal nerves  Fires Quad/Hamstrings/GastrocSoleusComplex/AT/EHL muscle groups    Special  Negative FADIR  Negative FATUMA  Positive Scour  Positive Stinchfield's  Positive Trendelenburg on the right    Diagnostic Studies:    Radiographs of the right hip were personally reviewed in office, revealing no evidence of  fracture or dislocation. Normal joint space and bony architecture.    Assessment:    Greater Trochanteric Pain Syndrome, Right     Plan:    A trial of conservative management is recommended. Physical therapy is deferred by the patient but can consider revisiting this with focus on stretching and strengthening exercises for hip abductors and rotators. Recommend OTC medication: Tylenol 1000 mg three times daily as needed for pain. Rx Diclofenac and muscle relaxant was prescribed for breakthrough pain which can be taken with Tylenol. After a comprehensive discussion about the potential benefits and limitations of injection therapy as a treatment option for the suspected diagnosis, the patient opted to defer therapeutic injection in lieu of other conservative treatment options. Suggest using a foam roller for self-myofascial release. No bracing indicated for this condition. An MRI is not indicated at this time but can consider in the future. Activity modification advised, recommending the patient to avoid activities that exacerbate symptoms. Tentative follow-up in 2-4 weeks to assess response to therapy and conservative management.        Melchor Luther DO, CAQSM   Primary Care Sports Medicine    Department of Orthopaedic Surgery  [facility]    [address] [place] [id number]  [address] [place] [address]    t: [phone]  f: [phone]      [facility]

## 2024-07-03 ENCOUNTER — OFFICE VISIT (OUTPATIENT)
Facility: CLINIC | Age: 44
End: 2024-07-03
Payer: COMMERCIAL

## 2024-07-03 VITALS — WEIGHT: 126 LBS | HEIGHT: 66 IN | BODY MASS INDEX: 20.25 KG/M2

## 2024-07-03 DIAGNOSIS — M25.551 GREATER TROCHANTERIC PAIN SYNDROME OF RIGHT LOWER EXTREMITY: Primary | ICD-10-CM

## 2024-07-03 DIAGNOSIS — M79.7 FIBROMYALGIA: ICD-10-CM

## 2024-07-03 DIAGNOSIS — M24.851 OTH SPECIFIC JOINT DERANGEMENTS OF RIGHT HIP, NEC: ICD-10-CM

## 2024-07-03 RX ORDER — METHOCARBAMOL 750 MG/1
750 TABLET, FILM COATED ORAL NIGHTLY
Qty: 20 TABLET | Refills: 0 | Status: SHIPPED | OUTPATIENT
Start: 2024-07-03 | End: 2024-07-23

## 2024-07-03 RX ORDER — TRIAMCINOLONE ACETONIDE 40 MG/ML
40 INJECTION, SUSPENSION INTRA-ARTICULAR; INTRAMUSCULAR ONCE
Status: COMPLETED | OUTPATIENT
Start: 2024-07-03 | End: 2024-07-03

## 2024-07-03 RX ORDER — KETOROLAC TROMETHAMINE 30 MG/ML
30 INJECTION, SOLUTION INTRAMUSCULAR; INTRAVENOUS ONCE
Status: COMPLETED | OUTPATIENT
Start: 2024-07-03 | End: 2024-07-03

## 2024-07-03 RX ADMIN — TRIAMCINOLONE ACETONIDE 40 MG: 40 INJECTION, SUSPENSION INTRA-ARTICULAR; INTRAMUSCULAR at 16:07:00

## 2024-07-03 RX ADMIN — KETOROLAC TROMETHAMINE 30 MG: 30 INJECTION, SOLUTION INTRAMUSCULAR; INTRAVENOUS at 16:07:00

## 2024-07-11 ENCOUNTER — PATIENT MESSAGE (OUTPATIENT)
Dept: FAMILY MEDICINE CLINIC | Facility: CLINIC | Age: 44
End: 2024-07-11

## 2024-07-11 DIAGNOSIS — Z79.899 ENCOUNTER FOR LONG-TERM CURRENT USE OF MEDICATION: ICD-10-CM

## 2024-07-11 DIAGNOSIS — G43.109 MIGRAINE WITH AURA AND WITHOUT STATUS MIGRAINOSUS, NOT INTRACTABLE: ICD-10-CM

## 2024-07-11 RX ORDER — SUMATRIPTAN 50 MG/1
TABLET, FILM COATED ORAL
Qty: 9 TABLET | Refills: 1 | Status: SHIPPED | OUTPATIENT
Start: 2024-07-11

## 2024-07-11 NOTE — TELEPHONE ENCOUNTER
From: Karol Packer  To: Leni Schumacher  Sent: 7/11/2024 6:29 AM CDT  Subject: Sumatriptan     Good Morning,    I was wondering if I could get a refill of the sumatriptan for my migraines until I see the Neuro 7/31. This is the only med that helps with the migraines and I am almost out.    Thank you in advance,  Tucker

## 2024-07-31 ENCOUNTER — OFFICE VISIT (OUTPATIENT)
Dept: NEUROLOGY | Facility: CLINIC | Age: 44
End: 2024-07-31
Payer: COMMERCIAL

## 2024-07-31 VITALS
RESPIRATION RATE: 12 BRPM | SYSTOLIC BLOOD PRESSURE: 112 MMHG | DIASTOLIC BLOOD PRESSURE: 72 MMHG | WEIGHT: 124.31 LBS | BODY MASS INDEX: 20 KG/M2 | HEART RATE: 90 BPM

## 2024-07-31 DIAGNOSIS — G43.009 MIGRAINE WITHOUT AURA AND WITHOUT STATUS MIGRAINOSUS, NOT INTRACTABLE: Primary | ICD-10-CM

## 2024-07-31 DIAGNOSIS — R47.89 WORD FINDING DIFFICULTY: ICD-10-CM

## 2024-07-31 DIAGNOSIS — R90.89 ABNORMAL FINDING ON MRI OF BRAIN: ICD-10-CM

## 2024-07-31 PROCEDURE — 99214 OFFICE O/P EST MOD 30 MIN: CPT | Performed by: OTHER

## 2024-07-31 RX ORDER — NORTRIPTYLINE HYDROCHLORIDE 10 MG/1
CAPSULE ORAL
Qty: 60 CAPSULE | Refills: 1 | Status: SHIPPED | OUTPATIENT
Start: 2024-07-31

## 2024-07-31 RX ORDER — BUTALBITAL, ACETAMINOPHEN AND CAFFEINE 50; 325; 40 MG/1; MG/1; MG/1
TABLET ORAL
Qty: 15 TABLET | Refills: 1 | Status: SHIPPED | OUTPATIENT
Start: 2024-07-31

## 2024-07-31 NOTE — PROGRESS NOTES
C/O migraines 2-3x/weekx 2 years. Topiramate stopped being effective, weaned herself off about 6 weeks ago. Using Sumatriptan with limited relief. Has tried tylenol and ibuprofen with success.

## 2024-07-31 NOTE — PROGRESS NOTES
[facility]   Neurology    Karol Packer Patient Status:  No patient class for patient encounter    1980 MRN [id number]   Location [facility], [facility], [place] PCP Leni Schumacher DO              HPI:   Karol Packer is a(n) 44 year old female with history of lupus who presents at the request of Leni Schumacher DO for evaluation of abnormal MRI brain. She was getting evaluated for hearing loss, and had an MRI brain with abnormal findings. Has seen another neurologist, and had LP and MRI c-spine and told she did not have MS. Has some records with her. Loses her train of thought. Has worsening depression and anxiety, and is being evaluated by a psychiatrist.  Interim  Since LOV 2022, reports migraines 2-3x or 3-4x/week x 2 years. Topiramate stopped being effective, weaned herself off about 6 weeks ago. Using Sumatriptan with limited relief. Has tried tylenol and ibuprofen with success. Gets 7 hours of sleep, takes trazodone. Eats 3 meals a day, tries to drink a lot of water. Coffee drinks 2 cups per week. Gets neck pain weekly. Taking sumatriptan, partly helps the pain once a week. She is an . Reports word finding has resolved with stopping Topamax. However, still has some word finding difficulty.         Meds tried   Topamax- not effective    Pertinent imaging and laboratory work-up:   Component      Latest Ref Rng 2023   Immature Granulocyte %      %    TSH      0.358 - 3.740 mIU/mL 1.390        B12 9/10/20- 315    MRI cervical 2022- no abnormal cord signal, mild djd changes, no significant canal or foraminal changes    LP 2022-  Csf protein 4, csf glucose 79, csf WBC 0, csf oligoclonal bands neg    MRI brain 2021 personally reviewed- nonspecific mild white matter linear, very small, left periventricular    IMPRESSION:     1. Normal internal auditory canals bilaterally.   2. Developmental venous anomaly (DVA) in the  region of the left corona radiata, previously noted on   the prior MRI examinations from 2020 and 2012.   3. A few small areas of T2 signal prolongation on FLAIR imaging perpendicularly oriented to the left   lateral ventricle in the region of the DVA, likely representing adjacent minimal gliosis..     RECOMMENDATIONS:  Follow up with clinical examination, and if warranted consider a follow-up MRI of   the brain with and without contrast in approximately 6 months to one year to reevaluate the T2   signal changes adjacent to the left lateral ventricle for stability, since this was not reported on   the previous studies.        Past Medical History:  Past Medical History:    Acute bronchitis due to severe acute respiratory syndrome coronavirus 2 (SARS-CoV-2)    Asthma (HCC)    Cervical polyp    Depression    Hip pain, acute, right    In area of anterior superior iliac spine    Lupus (HCC)    Migraines    Status post bilateral salpingectomy    Unintentional weight loss    As of 7/19/2021 maintaining weight since May or June 2021        Past Surgical History:  Past Surgical History:   Procedure Laterality Date    Colonoscopy N/A 6/2/2021    Procedure: COLONOSCOPY;  Surgeon: Jose Heard DO;  Location:  ENDOSCOPY    Colposcopy, cervix inc upper/adjacent vagina      Colposcopy,loop electrd cervix excis      Conization cervix,loop electrd      Salpingectomy Bilateral 12/09/2020    Laparoscopy    Tonsillectomy         Family History:  family history includes Breast Cancer in her mother; HTN in her maternal grandmother and mother; Heart Disorder in her maternal grandfather; Stroke in her father; acute MI in her maternal grandfather; hypothyroidism in her mother.      Social History:   reports that she quit smoking about 5 years ago. Her smoking use included cigarettes. She started smoking about 29 years ago. She has a 12 pack-year smoking history. She has never used smokeless tobacco. She reports current alcohol use.  She reports that she does not use drugs.    Allergies:  Allergies   Allergen Reactions    Ceclor HIVES     CAPS      Cefaclor HIVES     CAPS    Cellcept ANXIETY, CONFUSION, DIZZINESS, FATIGUE, OTHER (SEE COMMENTS), PAIN and SHORTNESS OF BREATH    Mycophenolate ANXIETY, CONFUSION, DIZZINESS, FATIGUE, OTHER (SEE COMMENTS), PAIN and SHORTNESS OF BREATH     Other reaction(s): ANXIETY, CONFUSION, FATIGUE, OTHER (SEE COMMENTS), PAIN      Meloxicam OTHER (SEE COMMENTS)     Anxiety/Depression  Anxiety/Depression  Other reaction(s): OTHER (SEE COMMENTS)  Anxiety/Depression  Other reaction(s): OTHER (SEE COMMENTS)  Anxiety/Depression       MEDICATIONS:    Current Outpatient Medications:     nortriptyline 10 MG Oral Cap, Take 1 cap nightly for 3-4 days, then increase to 2 caps nightly if tolerating, Disp: 60 capsule, Rfl: 1    butalbital-acetaminophen-caffeine -40 MG Oral Tab, Take at onset of migraine, can repeat in 4-6 hours. Do not take more than 2-3 times a week, Disp: 15 tablet, Rfl: 1    SUMAtriptan 50 MG Oral Tab, TAKE AT 1ST SIGN/SYMPTOM OF MIGRAINE HEADACHE. MAY REPEAT DOSE IN 2HRS FOR 1 TIME MAX 100MG IN 24HRS, Disp: 9 tablet, Rfl: 1    atomoxetine 40 MG Oral Cap, Take 1 capsule (40 mg total) by mouth nightly. TAKE AT BEDTIME, Disp: , Rfl:     cyclobenzaprine 10 MG Oral Tab, Take 0.5-1 tablets (5-10 mg total) by mouth 2 (two) times daily as needed for Muscle spasms., Disp: 30 tablet, Rfl: 0    montelukast 10 MG Oral Tab, TAKE 1 TABLET BY MOUTH EVERY DAY AT NIGHT, Disp: 90 tablet, Rfl: 0    Methotrexate Sodium, PF, 50 MG/2ML Injection Solution, Inject 0.8 mL (20 mg total) into the skin once a week. Inject 20 mg as directed, Disp: 4 each, Rfl: 2    Syringe/Needle, Disp, (BD SAFETYGLIDE SYRINGE/NEEDLE) 27G X 5/8\" 1 ML Does not apply Misc, To be used for Methotrexate injection once weekly, Disp: 16 each, Rfl: 3    gabapentin 100 MG Oral Cap, Take 2 capsules at bedtime for a total of 800 mg nightly., Disp: 180  capsule, Rfl: 5    hydroxychloroquine 200 MG Oral Tab, Take 2 tablets (400 mg total) by mouth daily., Disp: 180 tablet, Rfl: 2    gabapentin 300 MG Oral Cap, Take 2 capsules (600 mg total) by mouth nightly., Disp: 60 capsule, Rfl: 5    folic acid 1 MG Oral Tab, Take 1 tablet (1 mg total) by mouth daily., Disp: 90 tablet, Rfl: 1    traZODone 50 MG Oral Tab, Take 1 tablet (50 mg total) by mouth. 0.5-1 tablet by mouth nightly as needed, Disp: , Rfl:     albuterol (PROAIR HFA) 108 (90 Base) MCG/ACT Inhalation Aero Soln, Inhale 2 puffs into the lungs every 6 (six) hours as needed for Wheezing or Shortness of Breath (Cough)., Disp: 1 each, Rfl: 0    benztropine 1 MG Oral Tab, Take 1 tablet (1 mg total) by mouth 2 (two) times daily., Disp: , Rfl:     BD TB SYRINGE 25G X 5/8\" 1 ML Does not apply Misc, , Disp: , Rfl:     busPIRone HCl 15 MG Oral Tab, Take by mouth 2 (two) times a day.  , Disp: , Rfl:     Cholecalciferol (VITAMIN D3) 125 MCG (5000 UT) Oral Tab, Take 1 tablet (5,000 Units total) by mouth daily., Disp: , Rfl:     loratadine 10 MG Oral Tab, Take 1 tablet (10 mg total) by mouth daily., Disp: , Rfl:     Sertraline HCl 100 MG Oral Tab, Take 2 tablets (200 mg total) by mouth daily. Take 2 tab once a day, Disp: , Rfl:     ALPRAZolam 0.25 MG Oral Tab, Take 1 tablet (0.25 mg total) by mouth daily as needed., Disp: , Rfl:     Fluticasone Propionate 50 MCG/ACT Nasal Suspension, 2 sprays by Each Nare route daily., Disp: 1 Bottle, Rfl: 3      Review of Systems:   A comprehensive 10 point review of systems was completed.     Pertinent positives and negatives noted in the HPI.         PHYSICAL EXAM:   Neurologic Exam  Vitals  Vitals:    07/31/24 1050   BP: 112/72   Pulse: 90   Resp: 12     General Appearance: Patient is a 44 year old female in no acute distress  Cardiac: Normal rate & regular rhythm  Skin: There are no rashes or other skin lesions.  Musculoskeletal: There is no scoliosis, or joint deformities  Neurologic  examination:  Mental status: Patient is alert, attentive, and oriented x 3. Language is coherent and fluent without aphasia. Memory, comprehension and ability to follow commands were intact.   Cranial nerves II-XII: Optic discs were sharp. Pupils were round and reacted to light. Extraocular movements were full. There was no face, jaw, palate or tongue weakness or atrophy. Facial sensation was normal. Hearing was grossly intact. Shoulder shrug was normal.   Motor exam revealed normal muscle bulk and tone. No atrophy or fasciculations. Manual muscle testing revealed MRC grade 5/5 strength throughout including proximal and distal muscles of the arms and legs.  Deep tendon reflexes were 2 at the biceps, brachioradialis, triceps, knee jerk, and ankle jerk. Plantar responses were flexor bilaterally.   Sensory exam revealed normal light touch perception. Vibratory perception and proprioception were intact at the toes. Pinprick and temperature were normal. Romberg sign was absent.  Complex motor skills revealed normal coordination. Finger-nose-finger intact.   Gait was narrow and stable, was able to walk on heels, toes and tandem without any difficulty.     ASSESSMENT/ACTIVE PROBLEM LIST:     Encounter Diagnoses   Name Primary?    Migraine without aura and without status migrainosus, not intractable Yes    Abnormal finding on MRI of brain     Word finding difficulty        Discussion/Plan:  Migraines without aura  Topamax not effective. Recommend trial of nortriptyline 20mg (will then stop or lower trazodone)  If SE with nortriptyline, trial Depakote. Patient had tubal ligation, no chance of problems  Decrease trazodone to 50mg x 2 nights, then 25mg x 2-3 nights, then stop. Can add back 25 or 50mg if needed  Start magnesium threonate or glycinate, not more than 400mg nightly  Switch to fiorcet from sumatriptan as abortive  To know triggers and lifestyle behaviors including limiting caffeine intake, not skipping meals,  having regular sleep schedule and practicing good sleep hygiene, avoid migraine food triggers/try to identify if any of them are triggers (nitrates, aged cheeses, red wine, chocolate, msg, artificial sweetener). Also discussed medication overuse headache including taking abortive medications, or any combination of them, on more than 2-3 days of the week, and to take abortive medication immediately at onset of headache    Nonspecific mild white matter linear, very small, left periventricular T2 hyperintensity- likely nonspecific microischemic white matter abnormality, possibly related to migraines. As not clear from imaging, and recommended by radiology, repeat MRI brain for comparison, to make sure no signs of demyelinating disease, as clinically questioned. Work up for MS was otherwise negative. Repeat MRI brain.      Word finding difficulty- recheck B12 level, was 315 lower end in 9/2020. Improved since stopping topamax but still present occasionally      Requested Prescriptions     Signed Prescriptions Disp Refills    nortriptyline 10 MG Oral Cap 60 capsule 1     Sig: Take 1 cap nightly for 3-4 days, then increase to 2 caps nightly if tolerating    butalbital-acetaminophen-caffeine -40 MG Oral Tab 15 tablet 1     Sig: Take at onset of migraine, can repeat in 4-6 hours. Do not take more than 2-3 times a week          We discussed in depth regarding the diagnosis, prognosis, treatment. The patient was given ample opportunity to ask questions. All questions and concerns were addressed. 37 minutes were spent on this encounter, which included obtaining and reviewing history, examining the patient, reviewing and interpreting results, building a treatment plan, discussing treatment options, discussing medication instructions, educating the patient/family/caregiver, and completing documentation.       Stacie Cameron DO  Neuromuscular and General Neurology  [facility]

## 2024-07-31 NOTE — PATIENT INSTRUCTIONS
Start nortriptyline. If feeling very groggy, wait 3-4 more days before starting higher dose.    Start magnesium threonate or glycinate, not more than 400mg nightly    Decrease trazodone to 50mg x 2 nights, then 25mg x 2-3 nights, then stop. Can add back 25mg to 50mg if needed.

## 2024-08-02 ENCOUNTER — TELEPHONE (OUTPATIENT)
Dept: ORTHOPEDICS CLINIC | Facility: CLINIC | Age: 44
End: 2024-08-02

## 2024-08-02 NOTE — TELEPHONE ENCOUNTER
Patient called to schedule a video visit with Dr. Luther to discuss her 8/3 MRI results.    She states Dr. Luther offered this option to her during her last visit.    Please advise of appt time/ date.

## 2024-08-03 ENCOUNTER — HOSPITAL ENCOUNTER (OUTPATIENT)
Dept: MRI IMAGING | Age: 44
Discharge: HOME | End: 2024-08-03
Attending: FAMILY MEDICINE
Payer: COMMERCIAL

## 2024-08-03 ENCOUNTER — LAB ENCOUNTER (OUTPATIENT)
Dept: LAB | Age: 44
End: 2024-08-03
Attending: FAMILY MEDICINE
Payer: COMMERCIAL

## 2024-08-03 DIAGNOSIS — M79.7 FIBROMYALGIA: ICD-10-CM

## 2024-08-03 DIAGNOSIS — R47.89 WORD FINDING DIFFICULTY: ICD-10-CM

## 2024-08-03 DIAGNOSIS — M25.551 GREATER TROCHANTERIC PAIN SYNDROME OF RIGHT LOWER EXTREMITY: ICD-10-CM

## 2024-08-03 LAB — VIT B12 SERPL-MCNC: 427 PG/ML (ref 211–911)

## 2024-08-03 PROCEDURE — 73721 MRI JNT OF LWR EXTRE W/O DYE: CPT | Performed by: FAMILY MEDICINE

## 2024-08-03 PROCEDURE — 82607 VITAMIN B-12: CPT

## 2024-08-03 PROCEDURE — 36415 COLL VENOUS BLD VENIPUNCTURE: CPT

## 2024-08-05 ENCOUNTER — OFFICE VISIT (OUTPATIENT)
Dept: RHEUMATOLOGY | Facility: CLINIC | Age: 44
End: 2024-08-05
Payer: COMMERCIAL

## 2024-08-05 ENCOUNTER — PATIENT MESSAGE (OUTPATIENT)
Dept: OBGYN CLINIC | Facility: CLINIC | Age: 44
End: 2024-08-05

## 2024-08-05 ENCOUNTER — PATIENT MESSAGE (OUTPATIENT)
Facility: CLINIC | Age: 44
End: 2024-08-05

## 2024-08-05 ENCOUNTER — LAB ENCOUNTER (OUTPATIENT)
Dept: LAB | Age: 44
End: 2024-08-05
Attending: INTERNAL MEDICINE
Payer: COMMERCIAL

## 2024-08-05 VITALS
SYSTOLIC BLOOD PRESSURE: 100 MMHG | HEART RATE: 90 BPM | BODY MASS INDEX: 21.53 KG/M2 | TEMPERATURE: 98 F | RESPIRATION RATE: 16 BRPM | DIASTOLIC BLOOD PRESSURE: 72 MMHG | HEIGHT: 66 IN | OXYGEN SATURATION: 97 % | WEIGHT: 134 LBS

## 2024-08-05 DIAGNOSIS — M79.7 FIBROMYALGIA: ICD-10-CM

## 2024-08-05 DIAGNOSIS — D84.9 IMMUNOSUPPRESSED STATUS (HCC): ICD-10-CM

## 2024-08-05 DIAGNOSIS — M35.01 SJOGREN'S SYNDROME WITH KERATOCONJUNCTIVITIS SICCA (HCC): ICD-10-CM

## 2024-08-05 DIAGNOSIS — F41.1 GAD (GENERALIZED ANXIETY DISORDER): ICD-10-CM

## 2024-08-05 DIAGNOSIS — D84.9 IMMUNOSUPPRESSED STATUS (HCC): Primary | ICD-10-CM

## 2024-08-05 DIAGNOSIS — M06.09 RHEUMATOID ARTHRITIS OF MULTIPLE SITES WITH NEGATIVE RHEUMATOID FACTOR (HCC): ICD-10-CM

## 2024-08-05 DIAGNOSIS — M06.09 RHEUMATOID ARTHRITIS OF MULTIPLE SITES WITHOUT RHEUMATOID FACTOR (HCC): ICD-10-CM

## 2024-08-05 DIAGNOSIS — Z51.81 THERAPEUTIC DRUG MONITORING: ICD-10-CM

## 2024-08-05 DIAGNOSIS — M32.9 LUPUS (HCC): ICD-10-CM

## 2024-08-05 LAB
ALBUMIN SERPL-MCNC: 4.4 G/DL (ref 3.2–4.8)
ALBUMIN/GLOB SERPL: 2.3 {RATIO} (ref 1–2)
ALP LIVER SERPL-CCNC: 71 U/L
ALT SERPL-CCNC: 12 U/L
ANION GAP SERPL CALC-SCNC: 2 MMOL/L (ref 0–18)
AST SERPL-CCNC: 19 U/L (ref ?–34)
BASOPHILS # BLD AUTO: 0.04 X10(3) UL (ref 0–0.2)
BASOPHILS NFR BLD AUTO: 0.6 %
BILIRUB SERPL-MCNC: 0.4 MG/DL (ref 0.3–1.2)
BUN BLD-MCNC: 15 MG/DL (ref 9–23)
C3 SERPL-MCNC: 76.1 MG/DL (ref 90–170)
C4 SERPL-MCNC: 13.8 MG/DL (ref 12–36)
CALCIUM BLD-MCNC: 9.7 MG/DL (ref 8.7–10.4)
CHLORIDE SERPL-SCNC: 106 MMOL/L (ref 98–112)
CO2 SERPL-SCNC: 29 MMOL/L (ref 21–32)
CREAT BLD-MCNC: 0.6 MG/DL
EGFRCR SERPLBLD CKD-EPI 2021: 113 ML/MIN/1.73M2 (ref 60–?)
EOSINOPHIL # BLD AUTO: 0.22 X10(3) UL (ref 0–0.7)
EOSINOPHIL NFR BLD AUTO: 3.2 %
ERYTHROCYTE [DISTWIDTH] IN BLOOD BY AUTOMATED COUNT: 14.3 %
ERYTHROCYTE [SEDIMENTATION RATE] IN BLOOD: 5 MM/HR
FASTING STATUS PATIENT QL REPORTED: YES
FOLATE SERPL-MCNC: >24 NG/ML (ref 5.4–?)
GLOBULIN PLAS-MCNC: 1.9 G/DL (ref 2–3.5)
GLUCOSE BLD-MCNC: 85 MG/DL (ref 70–99)
HCT VFR BLD AUTO: 38.5 %
HGB BLD-MCNC: 12.7 G/DL
IMM GRANULOCYTES # BLD AUTO: 0.02 X10(3) UL (ref 0–1)
IMM GRANULOCYTES NFR BLD: 0.3 %
LYMPHOCYTES # BLD AUTO: 1.85 X10(3) UL (ref 1–4)
LYMPHOCYTES NFR BLD AUTO: 26.7 %
MCH RBC QN AUTO: 33.8 PG (ref 26–34)
MCHC RBC AUTO-ENTMCNC: 33 G/DL (ref 31–37)
MCV RBC AUTO: 102.4 FL
MONOCYTES # BLD AUTO: 0.44 X10(3) UL (ref 0.1–1)
MONOCYTES NFR BLD AUTO: 6.3 %
NEUTROPHILS # BLD AUTO: 4.37 X10 (3) UL (ref 1.5–7.7)
NEUTROPHILS # BLD AUTO: 4.37 X10(3) UL (ref 1.5–7.7)
NEUTROPHILS NFR BLD AUTO: 62.9 %
OSMOLALITY SERPL CALC.SUM OF ELEC: 284 MOSM/KG (ref 275–295)
PLATELET # BLD AUTO: 210 10(3)UL (ref 150–450)
POTASSIUM SERPL-SCNC: 4.2 MMOL/L (ref 3.5–5.1)
PROT SERPL-MCNC: 6.3 G/DL (ref 5.7–8.2)
RBC # BLD AUTO: 3.76 X10(6)UL
SODIUM SERPL-SCNC: 137 MMOL/L (ref 136–145)
VIT D+METAB SERPL-MCNC: 82.4 NG/ML (ref 30–100)
WBC # BLD AUTO: 6.9 X10(3) UL (ref 4–11)

## 2024-08-05 PROCEDURE — 86225 DNA ANTIBODY NATIVE: CPT

## 2024-08-05 PROCEDURE — 80053 COMPREHEN METABOLIC PANEL: CPT

## 2024-08-05 PROCEDURE — G2211 COMPLEX E/M VISIT ADD ON: HCPCS | Performed by: INTERNAL MEDICINE

## 2024-08-05 PROCEDURE — 82746 ASSAY OF FOLIC ACID SERUM: CPT

## 2024-08-05 PROCEDURE — 86160 COMPLEMENT ANTIGEN: CPT

## 2024-08-05 PROCEDURE — 85025 COMPLETE CBC W/AUTO DIFF WBC: CPT

## 2024-08-05 PROCEDURE — 85652 RBC SED RATE AUTOMATED: CPT

## 2024-08-05 PROCEDURE — 36415 COLL VENOUS BLD VENIPUNCTURE: CPT

## 2024-08-05 PROCEDURE — 82306 VITAMIN D 25 HYDROXY: CPT

## 2024-08-05 PROCEDURE — 99214 OFFICE O/P EST MOD 30 MIN: CPT | Performed by: INTERNAL MEDICINE

## 2024-08-05 RX ORDER — ATOMOXETINE 60 MG/1
60 CAPSULE ORAL DAILY
COMMUNITY
Start: 2024-07-22

## 2024-08-05 RX ORDER — FOLIC ACID 1 MG/1
2 TABLET ORAL DAILY
Qty: 180 TABLET | Refills: 1 | Status: SHIPPED | OUTPATIENT
Start: 2024-08-05

## 2024-08-05 RX ORDER — GABAPENTIN 300 MG/1
600 CAPSULE ORAL NIGHTLY
Qty: 60 CAPSULE | Refills: 5 | Status: SHIPPED | OUTPATIENT
Start: 2024-08-05

## 2024-08-05 RX ORDER — GABAPENTIN 100 MG/1
CAPSULE ORAL
Qty: 180 CAPSULE | Refills: 5 | Status: SHIPPED | OUTPATIENT
Start: 2024-08-05

## 2024-08-05 RX ORDER — HYDROXYCHLOROQUINE SULFATE 200 MG/1
400 TABLET, FILM COATED ORAL DAILY
Qty: 180 TABLET | Refills: 1 | Status: SHIPPED | OUTPATIENT
Start: 2024-08-05

## 2024-08-05 RX ORDER — METHOTREXATE 25 MG/ML
20 INJECTION INTRA-ARTERIAL; INTRAMUSCULAR; INTRATHECAL; INTRAVENOUS WEEKLY
Qty: 4 EACH | Refills: 2 | Status: SHIPPED | OUTPATIENT
Start: 2024-08-05

## 2024-08-05 NOTE — TELEPHONE ENCOUNTER
Pt scheduled.    Future Appointments   Date Time Provider Department Center   8/17/2024 10:00 AM PF MRI RM1 (1.5T) [facility] [place]   10/10/2024  2:45 PM Bertha Velazquez MD [facility] [address]   2/5/2025  1:40 PM Adriane Blanton MD [id number] [address]

## 2024-08-05 NOTE — TELEPHONE ENCOUNTER
From: Karol Packer  To: Melchor RIGGS  Sent: 8/5/2024 10:38 AM CDT  Subject: MRI    The results are in and it says I have a tear. Should I schedule a video visit as discussed or an in person visit?    Thank you in advance,  Tucker Packer

## 2024-08-05 NOTE — PROGRESS NOTES
[facility], [address] [place] [address]     Progress Note     Karol Packer Patient Status:  No patient class for patient encounter    1980 MRN [id number]   Location [facility], [address] [place] [address] Attending No att. providers found   Hosp Day # 0 PCP Leni Schumacher DO     Chief Complaint: History of lupus fibromyalgia    Subjective:   S:     44-year-old woman comes in for reevaluation for lupus and fibromyalgia    She was last seen in clinic 2024  She has been stable on methotrexate 20 mg subcu week and hydroxychloroquine 400 mg daily  Eye exam was normal 2023  No major flareups  Recently diagnosed with ADHD along with depression and anxiety and is going was started on Concerta about 6 months ago  She is now seeing neurologist at [place] has upcoming MRI to follow-up with brain lesions from a year ago  Given new medications for migraines has not picked it up yet from the pharmacy  Significant improvement in her nonrestorative sleep fatigue  Continues to have stressors at work triggering her generalized pain and neuropathy no swelling of her joints  She is on gabapentin 600 mg at bedtime.  Along with another 200 mg at bedtime which was uptitrated last visit  She states no shortness of breath chest pain fevers or chills  Denies any flareups of swelling of her joints  Recent labs normal May 2024  Has some weight loss but she states this is more related to her depression and she is working on it with a psychiatrist  She has lost 2 pounds in the last 6 months likely because of her Concerta for ADHD  States no rashes oral nasal ulcers  No night sweats.  She has no concerns at this time.  Recent labs May 2023 normal      Previously in 2022  She had continued persistent hearing loss but stable  She had seen ENT who stated some stability but some worsening  They had recommended seeing a neurologist again which she has made an appointment at  [facility] with a new neurologist in September 2022  She was told nothing concerning based on MRI and maybe yearly MRI of the brain  Autoimmune testing repeated in April 2022 was normal including double-stranded DNA and complements  She continued methotrexate along with hydroxychloroquine  States no rashes no shortness of breath that is worse.    Her depression and anxiety are fairly stable.   States no weakness no swelling of her joints  Or new worsening pain.  States no rashes oral or nasal ulcers  Her weight has stabilized    Has longstanding history of hip impingement issues that come and go and does exercises on her own.     She has a grandson who is 2 months old which she is very active bleeding involved with an quite excited about.    Previously hearing loss had started November 2021  She had extensive workup and seen ENT and neurology subsequently  She had MRI that was abnormal and unclear for lesions but then further workup revealed no indication of multiple sclerosis lumbar puncture also was unrevealing  Since her double-strand DNA and complements were low. Suspected considering possibility of lupus related CNS disease  She was placed on steroids along with CellCept for at least 6-8 weeks  She cannot tolerate the CellCept after 4-6 weeks and stopped because of significant side effects  She has remained off steroids  She has some residual pulmonary issues but improving overall  She is gotten a chest x-ray which was normal  Her PCP thinks it is related to her allergies and she is on inhalers with improvement  We were previously considering Benlysta as an additional medication if her lupus testing is concerning    I discussed possibility of CNS related lupus with testing being abnormal which could possibly explain some of her manifestations but not clearly    She verbalizes understanding and agrees with this plan    She was continued on methotrexate was 20 mg subcutaneous every week    Previous MRI  which showed below findings    IMPRESSION:    1. Normal internal auditory canals bilaterally.  2. 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  3. A few small areas of T2 signal prolongation on FLAIR imaging perpendicularly oriented to the left  lateral ventricle in the region of the DVA, likely representing adjacent minimal gliosis..    FINDINGS: The internal auditory canals are symmetric. No abnormal enhancement following contrast  administration. The perimesencephalic and cerebellopontine angle cisterns are normal. FLAIR images demonstrate minimal periventricular white matter T2 enhancement perpendicular oriented to the left lateral ventricle. Otherwise, no abnormal signal. There is no restricted diffusion. Whole brain contrast enhanced images demonstrate no abnormal enhancement except for the small developmental venous anomaly in the left corona radiata. This is also near the area of the T2 signal prolongation near the lateral ventricle on the left, and likely accounts for that finding. Demyelination from multiple sclerosis is felt to be less likely, although can be seen with this type of white matterchange, since the patient does not carry a diagnosis of multiple sclerosis, and no similar findings  are seen throughout the remainder of the brain. This needs clinical correlation and follow-up.    She states her neurologist thinks that the cognitive issues could be related to Topamax but she would like to remain on this since this controlled her migraines overall  Prior to that She had malar rash, and polyarthritis and positive double strand DNA and low complements suggestive of active disease    This has resolved or joint swelling and stiffness and also some of her rash on her face    Her double strand DNA had improved     She states her side effects have improved but she has reflux from stress at work and thinks  she has an ulcer. She has been doing over-the-counter reflux medications. She has not made appointment with GI yet. Her weight has stabilized    Previously CT of the chest showed some mild lung collapse but no infiltrates.  CT abdomen and pelvis showed some gastric wall thickening but no acute findings    She does not feel like the weight loss is from her lupus and more her stress.        Her last imaging was May 2021 and showed multiple pulmonary nodules. She has been started on oral inhalers with improvement.    Chest x-ray was normal prior to starting methotrexate.    She states she is not planning on getting pregnant and understands the risk of doing so and she does not drink alcohol.    Risks and side effects discussed in detail including possibility of cytopenias kidney liver involvement.    She has remained on gabapentin 600 mg daily, hydroxychloroquine 400 mg daily.     Her eye exam was done By another ophthalmologist October 2021 and negative for toxicity    She states no vision changes or side effects otherwise    She  has multiple external allergies and is followed closely by her primary care physician with occasional wheezing and recently added Singulair albuterol and she does take over-the-counter Flonase as well.    She periodically has some stressors with her 19-year-old daughter quitting school and moving to [place] with her boyfriend.     Her daughter was diagnosed with high functioning autism and Tourette's syndrome. Overall she is trying to cope with this.     She does have some mild nonrestorative sleep and fatigue. Denies any suicidal ideation or active depression. She has general achiness overall.     Denies any photosensitive malar rash no swelling of her joints no oral or nasal ulcers. Her weight has been stable.     No night sweats fevers or chills Her pain levels are minimal and not concerning for her.       Review of Systems:     Constitutional: Negative for activity change, chills,  diaphoresis, fatigue, fever and unexpected weight change.    HENT: Negative for congestion, hearing loss and mouth sores.    Eyes:  Negative for pain, redness and visual disturbance.    Respiratory: Negative for apnea, cough, chest tightness, occasional shortness of breath, occasional wheezing Cardiovascular: Negative for chest pain, palpitations and leg swelling.    Gastrointestinal: Negative for abdominal distention, abdominal pain, blood in stool, constipation, diarrhea and nausea.    Endo: Negative.    Genitourinary:  Negative for decreased urine volume, difficulty urinating, dysuria, and frequency.     Musculoskeletal: Occasional arthralgias, no gait problem and joint swelling.    Skin: Negative. Negative for color change, pallor and rash. No raynauds or digital ulcerations.    Allergic/Immunologic: Negative.    Neurological: Negative. Negative for dizziness, tremors, seizures, syncope,  speech difficulty, weakness, light-headedness, numbness and headaches.    Hematological:  Does not bruise/bleed easily.    Psychiatric/Behavioral: Negative depression, confusion, decreased concentration, self-injury, sleep disturbance and suicidal ideas. The patient is not nervous/anxious.    Objective:   Vital signs:  [unfilled]    Wt Readings from Last 3 Encounters:   08/05/24 134 lb (60.8 kg)   07/31/24 124 lb 4.8 oz (56.4 kg)   07/03/24 126 lb (57.2 kg)       Intake/Output:  [unfilled]    [unfilled]  Physical Exam:      Constitutional: Is oriented to person, place, and time. No distress.    HEENT: Normocephalic, no jvd; no lad; EOMI; good oral pooling; no oral or nasal ulcers.    Eyes: Conjunctivae and EOM are normal. Pupils are equal, round, and reactive to light.    Neck: Normal range of motion. No thyromegaly present.    Cardiovascular: RRR, no murmurs.    Lungs: Clear, Bilateral air entry, + wheezes.    Abdominal: Soft. Nontender; nondistended; BS+.    Musculoskeletal:       Right shoulder:  Exhibits normal range  of motion on abduction and internal rotation, no tenderness, no bony tenderness, no deformity, no laceration, no pain and no spasm.       Left shoulder: Exhibits normal range of motion on abduction and internal and external rotation.  no tenderness, no bony tenderness, no swelling, no effusion, no deformity, no pain, no spasm and normal strength.       Right elbow: Exhibits normal range of motion, no swelling, no effusion and no deformity. No tenderness found. No medial epicondyle, no lateral epicondyle and no olecranon process tenderness noted. There are no contractures or tophi or nodules.       Left elbow: Exhibits normal range of motion, no swelling, no effusion and no deformity. No tenderness found. No medial epicondyle, no lateral epicondyle and no olecranon process tenderness noted.  There are no contractures or tophi or nodules       Right wrist: Exhibits normal range of motion, no tenderness, no bony tenderness, no swelling, no effusion and no crepitus. Flexion and extension intact without limitation.       Left wrist:  Exhibits normal range of motion, no tenderness, no bony tenderness, no swelling, no effusion, no crepitus and no deformity. Flexion and extension intact without limitation.       Right hip: Exhibits normal range of motion, normal strength, no tenderness, no bony tenderness, no swelling and no crepitus.       Right hand: No synovitis of MCP,PIP or DIP joints; no Bouchards or Heberden nodules noted;  strength: 100%.       Left hand: No synovitis of MCP,PIP or DIP joints; no Bouchards or Heberden nodules noted;  strength: 100%.       Left hip: Exhibits normal range of motion, normal strength, no tenderness, no bony tenderness, no swelling and no crepitus.       Right Hip: Normal without LROM or TTP       Right knee: Exhibits normal range of motion, no swelling, no effusion, no ecchymosis, no deformity and no erythema. No tenderness found. No medial joint line, no lateral joint line, no  MCL and no LCL tenderness noted. No crepitation found on flexion and extension normal.       Left knee: Exhibits normal range of motion, no swelling, no effusion, no ecchymosis and no erythema. No tenderness found. No medial joint line, no lateral joint line and no patellar tendon tenderness noted. No crepitation found on flexion and flexion intact.       Right ankle: Exhibits normal range of motion, no swelling, no deformity and no laceration. No tenderness. No lateral malleolus and no medial malleolus tenderness found. Achilles tendon normal. Achilles tendon exhibits no pain, no defect and normal White's test results.       Left ankle: Exhibits normal range of motion, no swelling, and no deformity. No tenderness. No lateral malleolus and no medial malleolus tenderness found.       Cervical back: Exhibits normal range of motion, no tenderness, no bony tenderness, no swelling, no pain and no spasm.       Thoracic back: Exhibits normal range of motion, no tenderness, no bony tenderness and no spasm.       Lumbar back: Exhibits normal range of motion, no tenderness, no bony tenderness, no pain and no spasm.       Right foot: No tenderness, no bony tenderness, no crepitus and no laceration. There is no synovitis or tenderness of the MTP joints to palpation.  Mild soft tissue swelling MTP joints       Left foot: No tenderness, no bony tenderness and no crepitus. There is no synovitis or tenderness of the MTP joints to palpation.  Mild soft tissue swelling MTP joints    Lymphadenopathy: No noted lympadenopathy.    Neurological: Is alert and oriented. No focal motor or sensory abnormalities.    Skin: Skin is warm, dry and intact.    Psychiatric: Normal behavior    Tender points upper extremities lower extremities everywhere she is touch 12 out of 12 tender points    Results:   Diagnostic Data:      Labs:    [unfilled]    Imaging:   MRI HIPS, RIGHT (CPT=73721)    Result Date: 8/5/2024  CONCLUSION:  1. There is an  anterior superior acetabular labral tear of the right hip with mild right hip joint effusion.  No significant arthropathy or chondromalacia noted.  No MR evidence of a cam deformity.  Normal calculated alpha angle of 45ø. 2. Uterine fibroid disease with a 1.5 cm uterine fibroid noted along the inferior aspect of the uterine fundus. 3. Dominant bilateral ovarian cysts, with a 2.8 cm right ovarian cyst and 4.4 cm left ovarian cyst.  No free fluid or inflammatory changes within the pelvis noted.   LOCATION:  [facility]   Dictated by (CST): Teresa Perera DO on 8/05/2024 at 9:37 AM     Finalized by (CST): Teresa Perera DO on 8/05/2024 at 9:43 AM       [facility] GANGA 2D+3D SCREENING BILAT (CPT=77067/95989)    Result Date: 6/15/2024  CONCLUSION:   BI-RADS CATEGORY:  DIAGNOSTIC CATEGORY 1--NEGATIVE NO CHANGE FROM COMPARISON ASSESSMENT.   RECOMMENDATIONS:  ROUTINE MAMMOGRAM AND CLINICAL EVALUATION IN 12 MONTHS.   Because of breast density, this patient may benefit from supplemental screening with breast MRI, Molecular Breast Imaging (MBI) or bilateral whole breast ultrasound for increased sensitivity for detection of cancer which can be obscured mammographically.   Please contact your ordering provider to discuss supplemental screening options.    A letter explaining the results in lay terms has been sent to the patient.  This exam was evaluated with a computer-aided device.  This patient's information has been entered into a reminder system with a target due date for the next mammogram.   LOCATION:  [facility]   Dictated by (CST): Dennis Casas MD on 6/15/2024 at 12:52 PM     Finalized by (CST): Dennis Casas MD on 6/15/2024 at 12:53 PM       XR HIP W OR WO PELVIS 2 OR 3 VIEWS, RIGHT (CPT=73502)    Result Date: 6/9/2024  CONCLUSION:  There is no acute abnormality detected.   LOCATION:  [facility]   Dictated by (CST): Hector Contreras MD on 6/09/2024 at 7:04 AM     Finalized by (CST): Hector Contreras MD on 6/09/2024 at 7:05 AM          Reviewed CBC CMP complement C3-C4 double-stranded DNA normal May 2023    Medications:     Assessment & Plan:   ASSESSMENT / PLAN:     43-year-old woman comes in for reevaluation for:    Systemic lupus without renal manifestations  Mild generalized osteoarthritis  Fibromyalgia  Depression  History of vitamin D deficiency  Reactive airway disease likely related to asthma and allergies  And residual coronavirus related lung disease (see chest x-ray normal)  Abnormal T2 restricted changes concerns for demyelination on MRI of the brain with new onset right hearing loss for the last 4 weeks followed by ENT  Unclear etiology could consider CNS lupus related changes  Recent right hip labral tear followed by Ortho    Patient has no obvious synovitis on exam    Multiple tender points on exam with the increase in stress levels    Continue gabapentin 900 mg at bedtime; uptitrated February 2024 with improvement    Recent CBC CMP double-stranded DNA ESR normal May 2024    CT abdomen and pelvis chest reviewed with patient. Follow up with gastroenterology for EGD    Advised to consider Prilosec 40 mg daily    Previously had given her information to gastroenterology to likely proceed with EGD as the next step    Continue methotrexate to 20 mg subcutaneous every week and continue folic acid    Continue hydroxychloroquine 400 mg daily.    Eye exam normal in November 2023    Recent labs May 2024 normal    Update labs today to monitor for drug toxicity    Update labs today and give new standing labs to be done every 3 months    She states she is going to another retinal specialist actually make sure there is no issues    Lumbar puncture unrevealing for multiple sclerosis    Per patient neurologist does not think she has multiple sclerosis    She is getting a second opinion from neurologist at [facility] which I'm agreeable with. She likely needs another imaging of her brain.  She states her new neurologist has ordered an MRI  which she will get done shortly    She cannot tolerate CellCept which was stopped in 2023    Consider Benlsyta in the future if her double-strand DNA is abnormal to treat possibility of CNS related manifestations of lupus    Her inflammatory arthritis is finally under control.    Her weight has also stabilized.    Agree with Singulair and albuterol when necessary; This is likely related to her allergies and viral syndrome. There have been cases of ankle associated vasculitis with Singulair    Chest x-ray normal Jan 2022    She should get pulmonary function test through her primary care physician    Depression is stable on Zoloft 100 mg daily and trazodone 50 mg at bedtime now followed by psychiatry.  Now on Concerta in the last 6 months with overall improvement of her ADHD    She is on 2 additional medications which have improved her symptoms    Continue to encourage low intensity exercise and stress management    discussed importance of stress management.    And on vitamin D levels today    Follow-up with PCP for migraine headaches. If this is related to lupus it will improve with prednisone and methotrexate. Otherwise symptomatic control through PCP        Education and counseling provided to patient on medications and diagnosis. Instructed patient to call my office or seek medical attention immediately if symptoms worsen.    Return to clinic:  Return in about 6 months (around 2/5/2025).    Adriane Blanton MD

## 2024-08-05 NOTE — TELEPHONE ENCOUNTER
Please call patient to schedule office visit due to uterine fibroid and bilateral ovarian cysts noted on MRI. LOV 3/24/22 due for [employer] 3/24/23.  _______________________  Patient had right hip MRI completed per Dr. Roche order and results note:    - Uterine fibroid disease with a 1.5 cm uterine fibroid noted along the inferior aspect of the uterine fundus.   - Dominant bilateral ovarian cysts, with a 2.8 cm right ovarian cyst and 4.4 cm left ovarian cyst.  No free fluid or inflammatory changes within the pelvis noted.

## 2024-08-06 LAB — DSDNA IGG SERPL IA-ACNC: 2.6 IU/ML

## 2024-08-06 NOTE — TELEPHONE ENCOUNTER
Scheduled patient for video visit with Dr. Luther on 8/8 at 4:00 pm. Patient confirmed via [facility].

## 2024-08-08 ENCOUNTER — TELEMEDICINE (OUTPATIENT)
Dept: ORTHOPEDICS CLINIC | Facility: CLINIC | Age: 44
End: 2024-08-08
Payer: COMMERCIAL

## 2024-08-08 DIAGNOSIS — S73.191D TEAR OF RIGHT ACETABULAR LABRUM, SUBSEQUENT ENCOUNTER: Primary | ICD-10-CM

## 2024-08-08 DIAGNOSIS — M79.7 FIBROMYALGIA: ICD-10-CM

## 2024-08-08 RX ORDER — PREDNISONE 20 MG/1
40 TABLET ORAL DAILY
Qty: 14 TABLET | Refills: 0 | Status: SHIPPED | OUTPATIENT
Start: 2024-08-08 | End: 2024-08-15

## 2024-08-08 NOTE — PROGRESS NOTES
Sports Medicine Clinic Note    Subjective:    Chief Complaint: Right hip pain, persistent despite treatment.    Interval History: The patient is a 44-year-old female calling for virtual video follow-up regarding her right hip pain. She underwent an MRI of the right hip following her last visit. Since the last visit, the patient reports minimal improvement in her symptoms following the ultrasound-guided trochanteric bursa injection. She continues to experience dull, aching pain localized over the greater trochanter with radiation into the groin. The pain is particularly bothersome during activities such as prolonged standing or walking, and it disrupts her sleep. The patient reports compliance with her home exercise program, though she feels it has provided limited relief. She remains concerned about the effectiveness of injections but is open to discussing further management options, including potential surgical intervention given the MRI findings.    Objective:    Diagnostic Tests:    MRI of the right hip reviewed during this visit shows:    Anterior superior acetabular labral tear with mild hip joint effusion.  No significant arthropathy, chondromalacia, or cam deformity.  Normal calculated alpha angle of 45°.  Incidental findings include a 1.5 cm uterine fibroid and bilateral ovarian cysts, the largest on the left measuring 4.4 cm.    Assessment:    Anterior Superior Acetabular Labral Tear, Right Hip.    Plan:    Therapy:  Continue with a modified home exercise program focusing on hip stabilization. Recommend referral to physical therapy to assist in managing symptoms related to both the labral tear and trochanteric pain syndrome.    Medications:  Discussed the potential for short-term use of a corticosteroid or alternative anti-inflammatory, considering the patient’s previous response to the injection, steroid burst prescribed.    Procedures:  Discussed with the patient the option of a diagnostic and potentially  therapeutic hip joint injection under ultrasound guidance to confirm the labral tear as the primary pain generator. This may also provide temporary relief and help guide future surgical planning.    Activity Recommendations:  Continue to avoid activities that exacerbate symptoms, such as prolonged standing or high-impact exercises. Encourage the patient to engage in low-impact activities like swimming or cycling, which may help maintain joint mobility without exacerbating symptoms.    Follow-Up:  Schedule in person follow-up for injection therapy or if symptoms worsen.    Telehealth Visit Disclaimer:    This clinician is part of the telehealth program and is conducting this visit in a currently approved location. For this visit the clinician and patient were present via interactive video telecommunications system that permits two-way, real-time/synchronous communications. This has been done in good dasha to provide continuity of care in the best interest of the provider-patient relationship, due to the ongoing public health crisis/national emergency and because of restrictions of visitation. There are limitations of this visit as no hands-on physical examination could be performed.    Patient consent given for telehealth visit: Yes  Patient and clinician are currently located in the [facility].  The clinician is appropriately licensed in the above state to provide care for this visit.  Other individuals present during the telehealth encounter and their role/relation: N/A  Total time spent in medical video consultation (required if billing based on time): 30 minutes  Total time spent providing education, coordination of care/services, and counselin minutes    This billing was spent on history taking; observational physical exam; review of labs, medications, and radiology tests; and decision making.  Appropriate medical decision-making and tests are ordered as detailed in the plan of care  above.      Melchor Luther DO, CAQSM   Primary Care Sports Medicine    Department of Orthopaedic Surgery  [facility]    [address] [place] [id number]  [address] [place] [address]    t: [phone]  f: [phone]      [facility]

## 2024-08-12 ENCOUNTER — PATIENT MESSAGE (OUTPATIENT)
Dept: NEUROLOGY | Facility: CLINIC | Age: 44
End: 2024-08-12

## 2024-08-12 DIAGNOSIS — Z79.899 ENCOUNTER FOR LONG-TERM CURRENT USE OF MEDICATION: ICD-10-CM

## 2024-08-12 DIAGNOSIS — J45.40 MODERATE PERSISTENT ASTHMA WITHOUT COMPLICATION (HCC): ICD-10-CM

## 2024-08-12 DIAGNOSIS — J30.2 PERENNIAL ALLERGIC RHINITIS WITH SEASONAL VARIATION: ICD-10-CM

## 2024-08-12 DIAGNOSIS — J30.89 PERENNIAL ALLERGIC RHINITIS WITH SEASONAL VARIATION: ICD-10-CM

## 2024-08-12 RX ORDER — SUMATRIPTAN 50 MG/1
TABLET, FILM COATED ORAL
Qty: 9 TABLET | Refills: 0 | Status: SHIPPED | OUTPATIENT
Start: 2024-08-12

## 2024-08-12 NOTE — TELEPHONE ENCOUNTER
Medication: Sumatriptan 50 mg     Date of last refill: Not [place]  [place]     Last office visit: 07/31/2024  Due back to clinic per last office note:  4 months  Date next office visit scheduled:    Future Appointments   Date Time Provider Department Center   8/17/2024 10:00 AM PF [facility] (1.5T) [facility]   8/23/2024 12:00 PM Melchor Luther DO [facility] [address]   10/10/2024  2:45 PM Bertha Velazquez MD [facility] [address]   2/5/2025  1:40 PM Adriane Blanton MD [facility] [address]           Last OV note recommendation:    Discussion/Plan:  Migraines without aura  Topamax not effective. Recommend trial of nortriptyline 20mg (will then stop or lower trazodone)  If SE with nortriptyline, trial Depakote. Patient had tubal ligation, no chance of problems  Decrease trazodone to 50mg x 2 nights, then 25mg x 2-3 nights, then stop. Can add back 25 or 50mg if needed  Start magnesium threonate or glycinate, not more than 400mg nightly  Switch to fiorcet from sumatriptan as abortive  To know triggers and lifestyle behaviors including limiting caffeine intake, not skipping meals, having regular sleep schedule and practicing good sleep hygiene, avoid migraine food triggers/try to identify if any of them are triggers (nitrates, aged cheeses, red wine, chocolate, msg, artificial sweetener). Also discussed medication overuse headache including taking abortive medications, or any combination of them, on more than 2-3 days of the week, and to take abortive medication immediately at onset of headache     Nonspecific mild white matter linear, very small, left periventricular T2 hyperintensity- likely nonspecific microischemic white matter abnormality, possibly related to migraines. As not clear from imaging, and recommended by radiology, repeat MRI brain for comparison, to make sure no signs of demyelinating disease, as clinically questioned. Work up for MS was  otherwise negative. Repeat MRI brain.        Word finding difficulty- recheck B12 level, was 315 lower end in 9/2020. Improved since stopping topamax but still present occasionally

## 2024-08-12 NOTE — TELEPHONE ENCOUNTER
From: Karol Packer  To: Katherine Cameron  Sent: 8/12/2024 7:21 AM CDT  Subject: Butalb-Acetaminophen     Good Morning,    I am writing because I had a migraine yesterday. I woke up with it, and took the rescue medication as soon as I felt it. I repeated it in 4 hours as directed. It did not touch the pain, and I ended up having to to take a Sumatriptan which helped and with a nap it went away. I don’t think the Butalb-acetaminophen is going to work for me. Is there something else I can take or just continue with the sumatriptan? If so I need a refill on that as I do not have anymore.  I am taking the Nortiptyline, 2 tabs 1 time a day at bed time and I am taking 25 trazodone. I was sleeping better when taking 50 tazadone. This medicine takes time to work correct? Like 4-6 weeks?    [facility] you so much!!  Tucker

## 2024-08-13 NOTE — TELEPHONE ENCOUNTER
Patient requesting refill.       Requested Prescriptions     Pending Prescriptions Disp Refills    MONTELUKAST 10 MG Oral Tab [Pharmacy Med Name: MONTELUKAST SOD 10 MG TABLET] 90 tablet 0     Sig: TAKE 1 TABLET BY MOUTH EVERY DAY AT NIGHT       Last Refill: 5/14    Last OV: for asthma 4/10/24

## 2024-08-15 RX ORDER — MONTELUKAST SODIUM 10 MG/1
10 TABLET ORAL NIGHTLY
Qty: 90 TABLET | Refills: 0 | Status: SHIPPED | OUTPATIENT
Start: 2024-08-15

## 2024-08-17 ENCOUNTER — HOSPITAL ENCOUNTER (OUTPATIENT)
Dept: MRI IMAGING | Age: 44
Discharge: HOME | End: 2024-08-17
Attending: Other
Payer: COMMERCIAL

## 2024-08-17 DIAGNOSIS — R90.89 ABNORMAL FINDING ON MRI OF BRAIN: ICD-10-CM

## 2024-08-17 PROCEDURE — 70551 MRI BRAIN STEM W/O DYE: CPT | Performed by: OTHER

## 2024-08-23 ENCOUNTER — OFFICE VISIT (OUTPATIENT)
Facility: CLINIC | Age: 44
End: 2024-08-23
Payer: COMMERCIAL

## 2024-08-23 VITALS — BODY MASS INDEX: 21.53 KG/M2 | HEIGHT: 66 IN | WEIGHT: 134 LBS

## 2024-08-23 DIAGNOSIS — S73.191D TEAR OF RIGHT ACETABULAR LABRUM, SUBSEQUENT ENCOUNTER: Primary | ICD-10-CM

## 2024-08-23 DIAGNOSIS — M25.551 GREATER TROCHANTERIC PAIN SYNDROME OF RIGHT LOWER EXTREMITY: ICD-10-CM

## 2024-08-23 DIAGNOSIS — M79.7 FIBROMYALGIA: ICD-10-CM

## 2024-08-23 RX ORDER — TRIAMCINOLONE ACETONIDE 40 MG/ML
40 INJECTION, SUSPENSION INTRA-ARTICULAR; INTRAMUSCULAR ONCE
Status: COMPLETED | OUTPATIENT
Start: 2024-08-23 | End: 2024-08-23

## 2024-08-23 RX ORDER — KETOROLAC TROMETHAMINE 30 MG/ML
30 INJECTION, SOLUTION INTRAMUSCULAR; INTRAVENOUS ONCE
Status: COMPLETED | OUTPATIENT
Start: 2024-08-23 | End: 2024-08-23

## 2024-08-23 RX ADMIN — KETOROLAC TROMETHAMINE 30 MG: 30 INJECTION, SOLUTION INTRAMUSCULAR; INTRAVENOUS at 13:04:00

## 2024-08-23 RX ADMIN — TRIAMCINOLONE ACETONIDE 40 MG: 40 INJECTION, SUSPENSION INTRA-ARTICULAR; INTRAMUSCULAR at 13:04:00

## 2024-08-23 NOTE — PROGRESS NOTES
Sports Medicine Clinic Note    Subjective:    Chief Complaint: Persistent right hip pain.    Interval History: The patient is a 44-year-old female returning for follow-up regarding her right hip pain. Since her last visit, the patient continues to experience persistent dull, aching pain localized over the greater trochanter with radiation into the groin. She reports that the pain remains particularly bothersome during activities such as prolonged standing, walking, and also disrupts her sleep. The patient completed her home exercise program and attended physical therapy sessions as recommended but reports limited improvement in symptoms. She expresses ongoing concerns about the effectiveness of injections but is willing to proceed with the right hip joint injection as discussed during the previous visit. She remains open to discussing potential surgical intervention depending on her response to today's injection.    Objective:    Right Hip Examination:    Inspection: No significant gait abnormalities noted. No visible swelling, erythema, or deformity.    Palpation: Mild tenderness noted in the groin area.    Range of Motion: Hip flexion to 120 degrees with discomfort at end range. Internal rotation limited to 15 degrees with pain. External rotation to 35 degrees with mild discomfort. Abduction to 30 degrees and adduction to 20 degrees, both with minimal discomfort.    Neurovascular: Sensation intact to light touch in the L2-S1 distributions. Strength 5/5 for hip flexion, extension, abduction, and adduction. Distal pulses (dorsalis pedis and posterior tibial) are 2+ and symmetric.    Diagnostic Tests:     No new testing.    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 No significant arthropathy, chondromalacia, or cam deformity.    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    Plan:    Procedures: Proceed with an ultrasound-guided right hip intra-articular corticosteroid injection today. This is intended both diagnostically to confirm the labral tear as the pain generator and therapeutically to provide symptom relief.    Therapy: Continue physical therapy with a focus on hip stabilization and mobility exercises. Emphasize adherence to a home exercise program targeting both core and gluteal strengthening to support the hip joint.    Medications: Consider short-term oral anti-inflammatories if pain persists post-injection, reassess at follow-up.    Activity Recommendations: Advise continued avoidance of activities that exacerbate symptoms, including prolonged standing and high-impact exercises. Encourage low-impact activities such as swimming or cycling. Instruct the patient to monitor symptoms closely post-injection.    Follow-Up: Follow up in 3-4 weeks to assess response to the injection and to discuss further management, including potential surgical options if symptoms persist. Advise the patient to contact the clinic sooner if significant improvement or worsening occurs.    Ultrasound Guided Procedure Note:    After discussion of the risks and benefits, the patient elected to proceed with a therapeutic injection into the right hip (femoroacetabular) under US guidance. Confirmed that the patient does not have history of prior adverse reactions, active infections, or relevant allergies. There was no erythema or warmth, and the skin was clear.    The skin was sterilized with ChloraPrep. A 22 gauge needle was inserted via inferolateral approach utilizing US for needle guidance and placement. The site was injected with a mixture of 1 mL of kenalog 40 mg/mL, 1 mL of Toradol 30 mg/mL and 1 mL of 1% Lidocaine without Epinephrine. The injection was completed without complication, and a bandage was applied.    The patient tolerated the procedure well and was instructed to avoid strenuous  activity for the next 24-48 hours and to use ice or Tylenol for pain as needed. The patient will call immediately with any signs of infection or allergic reaction.    Post-Injection Care: The patient tolerated the procedure well. An occlusive bandage was placed over the injection site. Post-injection care instructions provided to the patient. The patient was asked to avoid strenuous activity and continue to rest the area for 2-3 days before resuming regular activities. Patient advised that the area may be more painful for the first 1-2 days. They can use ice or Tylenol for pain as needed.  Patient was instructed to watch for fever, increased swelling, or persistent pain. The patient will call immediately with any signs of infection or allergic reaction.    Complications: The patient tolerated the procedure well without any complications.      Melchor Luther DO, CAQSM   Primary Care Sports Medicine    Department of Orthopaedic Surgery  [facility]    [address] [place] [address]  [address] [place] [id number]    t: [phone]  f: [phone]      [facility]

## 2024-08-29 NOTE — TELEPHONE ENCOUNTER
MCM sent to confirm current dose.    Medication: Nortriptyline 10mg     Date of last refill: 7/31/24 (#60/1)  [place]      Last office visit: 7/31/24  Due back to clinic per last office note:  4m  Date next office visit scheduled:    Future Appointments   Date Time Provider Department Center   9/16/2024  5:30 PM Leni Schumacher DO [facility] [address]   10/10/2024  2:45 PM Bertha Velazquez MD [facility] [address]   2/5/2025  1:40 PM Adriane Blanton MD [facility] [address]           Last OV note recommendation:    Migraines without aura  Topamax not effective. Recommend trial of nortriptyline 20mg (will then stop or lower trazodone)  If SE with nortriptyline, trial Depakote. Patient had tubal ligation, no chance of problems  Decrease trazodone to 50mg x 2 nights, then 25mg x 2-3 nights, then stop. Can add back 25 or 50mg if needed  Start magnesium threonate or glycinate, not more than 400mg nightly  Switch to fiorcet from sumatriptan as abortive  To know triggers and lifestyle behaviors including limiting caffeine intake, not skipping meals, having regular sleep schedule and practicing good sleep hygiene, avoid migraine food triggers/try to identify if any of them are triggers (nitrates, aged cheeses, red wine, chocolate, msg, artificial sweetener). Also discussed medication overuse headache including taking abortive medications, or any combination of them, on more than 2-3 days of the week, and to take abortive medication immediately at onset of headache     Nonspecific mild white matter linear, very small, left periventricular T2 hyperintensity- likely nonspecific microischemic white matter abnormality, possibly related to migraines. As not clear from imaging, and recommended by radiology, repeat MRI brain for comparison, to make sure no signs of demyelinating disease, as clinically questioned. Work up for MS was otherwise negative. Repeat MRI brain.        Word  finding difficulty- recheck B12 level, was 315 lower end in 9/2020. Improved since stopping topamax but still present occasionally

## 2024-08-30 ENCOUNTER — PATIENT MESSAGE (OUTPATIENT)
Dept: NEUROLOGY | Facility: CLINIC | Age: 44
End: 2024-08-30

## 2024-08-30 RX ORDER — NORTRIPTYLINE HCL 10 MG
20 CAPSULE ORAL NIGHTLY
Qty: 60 CAPSULE | Refills: 2 | Status: SHIPPED | OUTPATIENT
Start: 2024-08-30

## 2024-08-30 NOTE — TELEPHONE ENCOUNTER
From: Katherine Cameron  To: Karol Packer  Sent: 8/30/2024 1:15 PM CDT  Subject: trazodone and nortriptyline    Tucker,    I do recommend to continue the nortriptyline. However, with the nortriptyline, trazodone, and sertraline combination, there is some risk of a rare side effect called serotonin syndrome which can cause tremors, jerking, fever and confusion. I discussed at the visit to try to lower the trazodone when taking the nortriptyline, and if possible, try to stop it. You can try to do it very slowly, 1/2 tablet of trazodone decrease per week.     Dr. Cameron

## 2024-09-02 RX ORDER — FREMANEZUMAB-VFRM 225 MG/1.5ML
225 INJECTION SUBCUTANEOUS
Qty: 1 EACH | Refills: 0 | Status: SHIPPED | OUTPATIENT
Start: 2024-09-02

## 2024-09-03 ENCOUNTER — TELEPHONE (OUTPATIENT)
Dept: NEUROLOGY | Facility: CLINIC | Age: 44
End: 2024-09-03

## 2024-09-04 NOTE — TELEPHONE ENCOUNTER
I recommend to decrease the nortriptyline to 10mg from 20mg, and start Ajovy. Then, after 1-2 weeks, she can try stopping the nortriptyline.

## 2024-09-09 ENCOUNTER — PATIENT MESSAGE (OUTPATIENT)
Facility: CLINIC | Age: 44
End: 2024-09-09

## 2024-09-09 NOTE — TELEPHONE ENCOUNTER
If she's feeling slightly better from injection would recommend combining this with a renewed course of PT focused on hip girdle/labral tear prior to arthroscopy. If she wants to discuss more aggressive intervention would recommend [facility] in [place]

## 2024-09-09 NOTE — TELEPHONE ENCOUNTER
Pts last injection was 8/23/24    Persistent right hip pain suspect secondary to anterior superior acetabular labral tear.

## 2024-09-09 NOTE — TELEPHONE ENCOUNTER
From: Karol Packer  To: Melchor RIGGS  Sent: 9/9/2024 8:09 AM CDT  Subject: Right hip    Good Morning,    I’m writing because my hip is starting to hurt again, and I don’t want to let it get really bad. I am not sure what the next step is, the injection did help, but I can tell the pain is coming back. Please let me know.    Thank you!  Tucker Packer

## 2024-09-13 NOTE — TELEPHONE ENCOUNTER
Nobody doing hip scopes right now in our group. The names I am aware of through [facility] are Marisa Douglas, Juan Alberto, and Robe

## 2024-09-27 ENCOUNTER — OFFICE VISIT (OUTPATIENT)
Dept: FAMILY MEDICINE CLINIC | Facility: CLINIC | Age: 44
End: 2024-09-27
Payer: COMMERCIAL

## 2024-09-27 VITALS
DIASTOLIC BLOOD PRESSURE: 66 MMHG | WEIGHT: 135 LBS | HEART RATE: 97 BPM | RESPIRATION RATE: 19 BRPM | OXYGEN SATURATION: 97 % | TEMPERATURE: 98 F | SYSTOLIC BLOOD PRESSURE: 102 MMHG | BODY MASS INDEX: 20.94 KG/M2 | HEIGHT: 67.21 IN

## 2024-09-27 DIAGNOSIS — Z79.899 ENCOUNTER FOR LONG-TERM CURRENT USE OF MEDICATION: ICD-10-CM

## 2024-09-27 DIAGNOSIS — J45.40 MODERATE PERSISTENT ASTHMA WITHOUT COMPLICATION (HCC): ICD-10-CM

## 2024-09-27 DIAGNOSIS — R60.0 BILATERAL LOWER EXTREMITY EDEMA: ICD-10-CM

## 2024-09-27 DIAGNOSIS — M79.604 BILATERAL LEG AND FOOT PAIN: ICD-10-CM

## 2024-09-27 DIAGNOSIS — Z00.00 ROUTINE GENERAL MEDICAL EXAMINATION AT A HEALTH CARE FACILITY: Primary | ICD-10-CM

## 2024-09-27 DIAGNOSIS — M79.672 BILATERAL LEG AND FOOT PAIN: ICD-10-CM

## 2024-09-27 DIAGNOSIS — M79.671 BILATERAL LEG AND FOOT PAIN: ICD-10-CM

## 2024-09-27 DIAGNOSIS — R63.5 WEIGHT GAIN: ICD-10-CM

## 2024-09-27 DIAGNOSIS — M79.605 BILATERAL LEG AND FOOT PAIN: ICD-10-CM

## 2024-09-27 DIAGNOSIS — J30.2 PERENNIAL ALLERGIC RHINITIS WITH SEASONAL VARIATION: ICD-10-CM

## 2024-09-27 DIAGNOSIS — J30.89 PERENNIAL ALLERGIC RHINITIS WITH SEASONAL VARIATION: ICD-10-CM

## 2024-09-27 PROCEDURE — 99396 PREV VISIT EST AGE 40-64: CPT | Performed by: FAMILY MEDICINE

## 2024-09-27 PROCEDURE — 99215 OFFICE O/P EST HI 40 MIN: CPT | Performed by: FAMILY MEDICINE

## 2024-09-27 RX ORDER — HYDROCHLOROTHIAZIDE 12.5 MG/1
12.5 CAPSULE ORAL EVERY MORNING
Qty: 30 CAPSULE | Refills: 0 | Status: SHIPPED | OUTPATIENT
Start: 2024-09-27

## 2024-09-27 NOTE — PATIENT INSTRUCTIONS
-Take the hydrochlorothiazide in the morning for 7 to 10 days.  -Do the blood tests after being on the hydrochlorothiazide for 7 days.    -Recommend take over-the-counter magnesium glycinate supplement nature made or "[employer] bounty brand, take this while you are on the hydrochlorothiazide.    -If the swelling in your legs does not resolve, please schedule appointment to see Dr. Schumacher for follow-up.

## 2024-09-27 NOTE — PROGRESS NOTES
Chief Complaint   Patient presents with    Wellness Visit    Asthma    Swelling     Legs    Leg Pain    Foot Pain         HPI:   Karol Packer is a 44 year old female         Leg pain and swelling.  Pain of lower legs, calfs, and feet.  Patient notes that her socks leave indentations of the feet and ankles.  States she gained about 10# over the last 30 days.  Per pt the rheum thinks it may be water weight and they do not think it is inflammation.  Denies changes in diet or changes in meds.  Not really physically less active.        Asthma/allergic rhinitis:  Patient reports she feels her asthma is well-controlled.  Taking montelukast nightly which is helping her asthma and nasal allergies.  Infrequently using the albuterol MDI.  ACT in office today is 25.          Symptoms: denies discharge, itching, burning or dysuria, periods are regular.     Last PAP: UTD  Abnormal PAP: many years ago, normal thereafter    Last colonoscopy: UTD due for recheck 2031  Last mammogram: UTD      Wt Readings from Last 6 Encounters:   09/27/24 135 lb (61.2 kg)   08/23/24 134 lb (60.8 kg)   08/05/24 134 lb (60.8 kg)   07/31/24 124 lb 4.8 oz (56.4 kg)   07/03/24 126 lb (57.2 kg)   06/19/24 126 lb (57.2 kg)     Body mass index is 21.02 kg/m².       Patient Active Problem List   Diagnosis    Lupus (HCC)    Fibromyalgia    General counseling for prescription of oral contraceptives    RONNIE (generalized anxiety disorder)    Chronic constipation    Perennial allergic rhinitis with seasonal variation    Therapeutic drug monitoring    Metrorrhagia    Migraine with aura and without status migrainosus, not intractable    Paranasal sinus disease    Paranasal sinus mucocele    Hot flushes, perimenopausal    Abnormal uterine bleeding (AUB)    Irregular menses    Immunosuppressed status (HCC)    GERD (gastroesophageal reflux disease)    Encounter for long-term current use of medication    Sudden right hearing loss    Sensorineural hearing loss  (SNHL), unspecified laterality    Pain of right hip    Second hand smoke exposure    Moderate persistent asthma without complication (HCC)    Long QT interval    Mood disorder (HCC)    Intractable chronic migraine without aura and without status migrainosus    Bilateral lower extremity edema    Bilateral leg and foot pain     Current Outpatient Medications   Medication Sig Dispense Refill    montelukast 10 MG Oral Tab Take 1 tablet (10 mg total) by mouth nightly. 90 tablet 1    hydroCHLOROthiazide 12.5 MG Oral Cap Take 1 capsule (12.5 mg total) by mouth every morning. 30 capsule 0    Fremanezumab-vfrm (AJOVY) 225 MG/1.5ML Subcutaneous Solution Auto-injector Inject 225 mg into the skin every 30 (thirty) days. 1 each 0    nortriptyline 10 MG Oral Cap Take 2 capsules (20 mg total) by mouth nightly. 60 capsule 2    SUMAtriptan 50 MG Oral Tab Use at onset; repeat once after 2 hours-ONLY 2 IN 24 HR MAX. This is a 30 day supply. 9 tablet 0    atomoxetine 60 MG Oral Cap Take 1 capsule (60 mg total) by mouth daily.      folic acid 1 MG Oral Tab Take 2 tablets (2 mg total) by mouth daily. 180 tablet 1    Methotrexate Sodium, PF, 50 MG/2ML Injection Solution Inject 0.8 mL (20 mg total) into the skin once a week. Inject 20 mg as directed 4 each 2    gabapentin 300 MG Oral Cap Take 2 capsules (600 mg total) by mouth nightly. 60 capsule 5    gabapentin 100 MG Oral Cap Take 2 capsules at bedtime for a total of 800 mg nightly. 180 capsule 5    hydroxychloroquine 200 MG Oral Tab Take 2 tablets (400 mg total) by mouth daily. 180 tablet 1    cyclobenzaprine 10 MG Oral Tab Take 0.5-1 tablets (5-10 mg total) by mouth 2 (two) times daily as needed for Muscle spasms. 30 tablet 0    Syringe/Needle, Disp, (BD SAFETYGLIDE SYRINGE/NEEDLE) 27G X 5/8\" 1 ML Does not apply Misc To be used for Methotrexate injection once weekly 16 each 3    traZODone 50 MG Oral Tab Take 1 tablet (50 mg total) by mouth. 0.5-1 tablet by mouth nightly as needed       albuterol (PROAIR HFA) 108 (90 Base) MCG/ACT Inhalation Aero Soln Inhale 2 puffs into the lungs every 6 (six) hours as needed for Wheezing or Shortness of Breath (Cough). 1 each 0    benztropine 1 MG Oral Tab Take 1 tablet (1 mg total) by mouth 2 (two) times daily.      BD TB SYRINGE 25G X 5/8\" 1 ML Does not apply Misc       busPIRone HCl 15 MG Oral Tab Take by mouth 2 (two) times a day.        Cholecalciferol (VITAMIN D3) 125 MCG (5000 UT) Oral Tab Take 1 tablet (5,000 Units total) by mouth daily.      loratadine 10 MG Oral Tab Take 1 tablet (10 mg total) by mouth daily.      Sertraline HCl 100 MG Oral Tab Take 2 tablets (200 mg total) by mouth daily. Take 2 tab once a day      Fluticasone Propionate 50 MCG/ACT Nasal Suspension 2 sprays by Each Nare route daily. 1 Bottle 3      Past Medical History:    Acute bronchitis due to severe acute respiratory syndrome coronavirus 2 (SARS-CoV-2)    Asthma (HCC)    Cervical polyp    Depression    Hip pain, acute, right    In area of anterior superior iliac spine    Lupus (HCC)    Migraines    Status post bilateral salpingectomy    Unintentional weight loss    As of 7/19/2021 maintaining weight since May or June 2021      Past Surgical History:   Procedure Laterality Date    Colonoscopy N/A 6/2/2021    Procedure: COLONOSCOPY;  Surgeon: Jose Heard DO;  Location:  ENDOSCOPY    Colposcopy, cervix inc upper/adjacent vagina      Colposcopy,loop electrd cervix excis      Conization cervix,loop electrd      Salpingectomy Bilateral 12/09/2020    Laparoscopy    Tonsillectomy        Family History   Problem Relation Age of Onset    Other (acute MI[other]) Maternal Grandfather     Heart Disorder Maternal Grandfather     Other (HTN[other]) Maternal Grandmother     Other (HTN[other]) Mother     Other (hypothyroidism[other]) Mother     Breast Cancer Mother         63 years ols    Stroke Father         59 years old      Social History:   Social History     Socioeconomic History     Marital status: Single   Tobacco Use    Smoking status: Former     Current packs/day: 0.00     Average packs/day: 0.5 packs/day for 24.0 years (12.0 ttl pk-yrs)     Types: Cigarettes     Start date: 1995     Quit date: 2019     Years since quittin.7    Smokeless tobacco: Never   Vaping Use    Vaping status: Never Used   Substance and Sexual Activity    Alcohol use: Yes     Comment: social    Drug use: No    Sexual activity: Not Currently   Other Topics Concern    Caffeine Concern Yes     Comment: 2 cups of coffee daily    Exercise No     Comment: Daily walking    Seat Belt Yes     Occ: . Marital Status: single. Children: 2.   Exercise:  none right now due to hip problem but trying to walk .    Diet:  tries to watch her diet and avoid fast food and junk food     REVIEW OF SYSTEMS:   GENERAL: Overall feels well  SKIN: denies any unusual skin lesions or rashes  EYES: denies vision changes  HEENT: denies upper respiratory symptoms  LUNGS: denies cough or shortness of breath with exertion  CHEST:  denies breast changes or pain  CARDIOVASCULAR: denies chest pain or tightness on exertion  VASCULAR: As in HPI  GI: denies abdominal pain, bowel movement changes, blood in stool  : No complaint of urinary problems, vaginal discharge or discomfort  MUSCULOSKELETAL: As in HPI  NEURO: denies headaches or dizziness  PSYCH: denies depression or anxiety  ENDOCRINE: No complaints of temperature intolerance, polyuria, or excessive sweating.  LYMPHATICS: No complaints of swollen glands      EXAM:   /66   Pulse 97   Temp 97.6 °F (36.4 °C) (Temporal)   Resp 19   Ht 5' 7.21\" (1.707 m)   Wt 135 lb (61.2 kg)   LMP 2024 (Exact Date)   SpO2 97%   BMI 21.02 kg/m²   Body mass index is 21.02 kg/m².   GENERAL: NAD, Pleasant well-appearing  female.  SKIN: No visible rashes or suspicious lesions.  HEENT: atraumatic, normocephalic, EACs and TMs clear normal bilaterally.  Nose: No nasal  discharge.  OP: MMM.  Posteriorly no exudate or erythema.  EYES: PERRL, EOMI, sclera, conjunctiva are clear  NECK: supple, no adenopathy/thyromegaly/masses  LUNGS: Scattered opening pop heard with inspiration, once or twice slight rhonchi, no wheezing  CARDIO: RRR without murmur normal S1S2  ABD:  Soft, non tender to palpation.  No masses, HSM, or pulsations appreciated.  MUSCULOSKELETAL: gait normal, no gross M/S defect.  EXTREMITIES: The lower extremity edema is nonpitting in the pretibial area but pitting in the feet as evidenced by indentations by socks.  Calf areas seem a little tight.  Toes of both feet mildly edematous.  NEURO: Alert and oriented x3.  No gross motor or sensory deficit.  PSYCH: normal affect      Immunization History   Administered Date(s) Administered    Covid-19 Vaccine Pfizer 30 mcg/0.3 ml 03/25/2021, 04/22/2021, 10/21/2021    DTP 09/04/1980, 12/26/1980, 03/04/1981, 05/05/1982    FLULAVAL 6 months & older 0.5 ml Prefilled syringe ([id number]) 11/05/2020, 12/07/2021    Influenza 11/05/2020, 12/07/2021, 03/24/2023    MMR 03/03/1982    OPV 09/04/1980, 12/26/1980, 05/05/1982    Pneumococcal Conjugate PCV20 08/16/2023    TDAP 04/02/2018, 05/15/2021   Deferred Date(s) Deferred    Influenza Vaccine Refused 09/23/2020           ASSESSMENT AND PLAN:   Karol Packer is a 44 year old female who presents for a complete physical exam.        Encounter Diagnoses   Name Primary?    Routine general medical examination at a health care facility Yes    Moderate persistent asthma without complication (HCC)     Encounter for long-term current use of medication     Bilateral lower extremity edema     Bilateral leg and foot pain     Perennial allergic rhinitis with seasonal variation     Weight gain        -Take the hydrochlorothiazide in the morning for 7 to 10 days.  -Do the blood tests after being on the hydrochlorothiazide for 7 days.    -Recommend take over-the-counter magnesium glycinate supplement  nature made or natures bounty brand, take this while you are on the hydrochlorothiazide.    -If the swelling in your legs does not resolve, please schedule appointment to see Dr. Schumacher for follow-up.      1. Routine general medical examination at a health care facility  Patient provided handout on women's health and prevention.   Routine health profile labs pending.  Recommend healthy diet including green leafy vegetables, fresh fruits and lean protein.  Aerobic exercise for total of 150 minutes/week is recommended for cardiovascular fitness, and 45-60 minutes 6-7 days a week to help obtain weight loss.   Age-appropriate calcium and vitamin D intake discussed.    - Lipid Panel; Future  - Assay, Thyroid Stim Hormone; Future  - Free T4, (Free Thyroxine); Future  - Basic Metabolic Panel (8) [E]; Future    2. Moderate persistent asthma without complication (HCC)  Asthma is well-controlled, ACT in the office today 25.  Recommend continue montelukast 10 mg nightly.  Continue albuterol as needed.  Follow-up on asthma in 6 months, sooner if needed.    - montelukast 10 MG Oral Tab; Take 1 tablet (10 mg total) by mouth nightly.  Dispense: 90 tablet; Refill: 1    3. Encounter for long-term current use of medication  Medication use, risks, benefits, side effects and precautions discussed, patient verbalizes understanding. Questions encouraged and answered to patient's satisfaction.    - montelukast 10 MG Oral Tab; Take 1 tablet (10 mg total) by mouth nightly.  Dispense: 90 tablet; Refill: 1    4. Bilateral lower extremity edema  The lower extremity edema is nonpitting in the pretibial area but pitting in the feet as evidenced by indentations by socks.  Etiology uncertain, currently doubt cardiac symptoms, patient not having chest pain, palpitations, chest pain with exertion, or dyspnea on exertion.  She has seen her rheumatologist and they do not think it is related to inflammation.  Patient reports she has not been eating  particularly salty foods and has not changed her diet or level of physical activity.  Trial of hydrochlorothiazide, discussed with patient I would avoid chasing the mild lower extremity swelling but that we could try 7 to 10 days of the hydrochlorothiazide then stop.  Do labs in approximately a week.  If swelling does not resolve, patient to return to clinic.    - hydroCHLOROthiazide 12.5 MG Oral Cap; Take 1 capsule (12.5 mg total) by mouth every morning.  Dispense: 30 capsule; Refill: 0  - Basic Metabolic Panel (8) [E]; Future    5. Bilateral leg and foot pain  She has seen her rheumatologist and they do not think it is related to inflammation.  Related to mild lower extremity swelling?  Osteoarthritis?  Versus other.  Recommend x-ray of both feet to evaluate for arthritic changes.  Follow-up/further recommendations pending results.    - XR FOOT, COMPLETE (MIN 3 VIEWS), RIGHT (CPT=73630); Future  - XR FOOT, COMPLETE (MIN 3 VIEWS), LEFT (CPT=73630); Future    6. Perennial allergic rhinitis with seasonal variation  Recommend continue montelukast as below.  Okay to take OTC antihistamine as needed as well.    - montelukast 10 MG Oral Tab; Take 1 tablet (10 mg total) by mouth nightly.  Dispense: 90 tablet; Refill: 1    7. Weight gain  9 to 10 pounds over the last 30 days.  Water weight gain?  Associated with abdominal bloating and lower extremity edema though then lower extremity edema is nonpitting.  She has seen her rheumatologist and they do not think it is related to inflammation.  Patient reports she has not been eating particularly salty foods and has not changed her diet or level of physical activity.  Follow-up pending lab results.            Orders Placed This Encounter   Procedures    Lipid Panel    Assay, Thyroid Stim Hormone    Free T4, (Free Thyroxine)    Basic Metabolic Panel (8) [E]       Meds & Refills for this Visit:  Requested Prescriptions     Signed Prescriptions Disp Refills    hydroCHLOROthiazide  12.5 MG Oral Cap 30 capsule 0     Sig: Take 1 capsule (12.5 mg total) by mouth every morning.    montelukast 10 MG Oral Tab 90 tablet 1     Sig: Take 1 tablet (10 mg total) by mouth nightly.       Imaging & Consults:  XR FOOT, COMPLETE (MIN 3 VIEWS), RIGHT (CPT=73630)  XR FOOT, COMPLETE (MIN 3 VIEWS), LEFT (CPT=73630)    Return in about 6 months (around 3/27/2025) for Asthma.  Sooner if needed for leg swelling and leg pain.

## 2024-09-28 PROBLEM — R60.0 BILATERAL LOWER EXTREMITY EDEMA: Status: ACTIVE | Noted: 2024-09-28

## 2024-09-28 PROBLEM — M79.604 BILATERAL LEG AND FOOT PAIN: Status: ACTIVE | Noted: 2024-09-28

## 2024-09-28 PROBLEM — M79.671 BILATERAL LEG AND FOOT PAIN: Status: ACTIVE | Noted: 2024-09-28

## 2024-09-28 PROBLEM — M79.605 BILATERAL LEG AND FOOT PAIN: Status: ACTIVE | Noted: 2024-09-28

## 2024-09-28 PROBLEM — M79.672 BILATERAL LEG AND FOOT PAIN: Status: ACTIVE | Noted: 2024-09-28

## 2024-09-28 RX ORDER — MONTELUKAST SODIUM 10 MG/1
10 TABLET ORAL NIGHTLY
Qty: 90 TABLET | Refills: 1 | Status: SHIPPED | OUTPATIENT
Start: 2024-09-28

## 2024-10-08 ENCOUNTER — TELEPHONE (OUTPATIENT)
Dept: RHEUMATOLOGY | Facility: CLINIC | Age: 44
End: 2024-10-08

## 2024-10-08 DIAGNOSIS — M06.09 RHEUMATOID ARTHRITIS OF MULTIPLE SITES WITH NEGATIVE RHEUMATOID FACTOR (HCC): ICD-10-CM

## 2024-10-08 DIAGNOSIS — M79.7 FIBROMYALGIA: Primary | ICD-10-CM

## 2024-10-08 DIAGNOSIS — Z51.81 THERAPEUTIC DRUG MONITORING: ICD-10-CM

## 2024-10-08 DIAGNOSIS — M35.01 SJOGREN'S SYNDROME WITH KERATOCONJUNCTIVITIS SICCA (HCC): ICD-10-CM

## 2024-10-08 DIAGNOSIS — M06.09 RHEUMATOID ARTHRITIS OF MULTIPLE SITES WITHOUT RHEUMATOID FACTOR (HCC): ICD-10-CM

## 2024-10-08 DIAGNOSIS — D84.9 IMMUNOSUPPRESSED STATUS (HCC): Primary | ICD-10-CM

## 2024-10-09 ENCOUNTER — PATIENT MESSAGE (OUTPATIENT)
Dept: FAMILY MEDICINE CLINIC | Facility: CLINIC | Age: 44
End: 2024-10-09

## 2024-10-09 DIAGNOSIS — G43.009 MIGRAINE WITHOUT AURA AND WITHOUT STATUS MIGRAINOSUS, NOT INTRACTABLE: Primary | ICD-10-CM

## 2024-10-09 DIAGNOSIS — M06.09 RHEUMATOID ARTHRITIS OF MULTIPLE SITES WITHOUT RHEUMATOID FACTOR (HCC): ICD-10-CM

## 2024-10-10 ENCOUNTER — OFFICE VISIT (OUTPATIENT)
Dept: OBGYN CLINIC | Facility: CLINIC | Age: 44
End: 2024-10-10
Payer: COMMERCIAL

## 2024-10-10 VITALS
SYSTOLIC BLOOD PRESSURE: 108 MMHG | HEART RATE: 92 BPM | HEIGHT: 67 IN | WEIGHT: 130 LBS | BODY MASS INDEX: 20.4 KG/M2 | DIASTOLIC BLOOD PRESSURE: 80 MMHG

## 2024-10-10 DIAGNOSIS — N83.202 BILATERAL OVARIAN CYSTS: ICD-10-CM

## 2024-10-10 DIAGNOSIS — N83.201 BILATERAL OVARIAN CYSTS: ICD-10-CM

## 2024-10-10 DIAGNOSIS — Z01.419 WELL WOMAN EXAM WITH ROUTINE GYNECOLOGICAL EXAM: Primary | ICD-10-CM

## 2024-10-10 DIAGNOSIS — Z12.4 SCREENING FOR CERVICAL CANCER: ICD-10-CM

## 2024-10-10 DIAGNOSIS — Z12.31 ENCOUNTER FOR SCREENING MAMMOGRAM FOR MALIGNANT NEOPLASM OF BREAST: ICD-10-CM

## 2024-10-10 PROCEDURE — 87625 HPV TYPES 16 & 18 ONLY: CPT | Performed by: OBSTETRICS & GYNECOLOGY

## 2024-10-10 PROCEDURE — 99396 PREV VISIT EST AGE 40-64: CPT | Performed by: OBSTETRICS & GYNECOLOGY

## 2024-10-10 PROCEDURE — 87624 HPV HI-RISK TYP POOLED RSLT: CPT | Performed by: OBSTETRICS & GYNECOLOGY

## 2024-10-10 PROCEDURE — 88175 CYTOPATH C/V AUTO FLUID REDO: CPT | Performed by: OBSTETRICS & GYNECOLOGY

## 2024-10-10 RX ORDER — SUMATRIPTAN 50 MG/1
TABLET, FILM COATED ORAL
Qty: 9 TABLET | Refills: 0 | Status: SHIPPED | OUTPATIENT
Start: 2024-10-10

## 2024-10-10 RX ORDER — METHOTREXATE 25 MG/ML
20 INJECTION INTRA-ARTERIAL; INTRAMUSCULAR; INTRATHECAL; INTRAVENOUS WEEKLY
Qty: 4 EACH | Refills: 2 | OUTPATIENT
Start: 2024-10-10

## 2024-10-10 RX ORDER — FREMANEZUMAB-VFRM 225 MG/1.5ML
225 INJECTION SUBCUTANEOUS
Qty: 1 EACH | Refills: 2 | Status: SHIPPED | OUTPATIENT
Start: 2024-10-10

## 2024-10-10 NOTE — PROGRESS NOTES
[facility]  Obstetrics and Gynecology  History & Physical    CC: Patient presents for a well woman exam     Subjective:     HPI: Karol Packer is a 44 year old  female here for a well women exam. Patient reports doing well.  Of note, the patient is scheduled for hip surgery.  Patient had a hip MRI that was noted for a 4.4 cm left ovarian cyst and a 2.8 cm right ovarian cyst.  Denies pelvic pain.    OB History:  OB History    Para Term  AB Living   2 2 1 1   2   SAB IAB Ectopic Multiple Live Births           2      # Outcome Date GA Lbr Darrian/2nd Weight Sex Type Anes PTL Lv   2   31w0d  7 lb 5 oz (3.317 kg) F NORMAL SPONT   FADY   1 Term  40w0d  7 lb 12 oz (3.515 kg) F NORMAL SPONT   FADY       Gyne History:  Hx Prior Abnormal Pap: No  Pap Date: 18  Pap Result Notes: wnl  Patient's last menstrual period was 2024 (approximate).  NILM neg 2022  denies history of STDs (non-HPV).   Birth control? BS 2020    Meds:  Medications Ordered Prior to Encounter[1]    All:  Allergies[2]    PMH:  Past Medical History:    Acute bronchitis due to severe acute respiratory syndrome coronavirus 2 (SARS-CoV-2)    Asthma (HCC)    Cervical polyp    Depression    Hip pain, acute, right    In area of anterior superior iliac spine    Lupus    Migraines    Status post bilateral salpingectomy    Unintentional weight loss    As of 2021 maintaining weight since May or 2021       Immunization History:   Immunization History   Administered Date(s) Administered    Covid-19 Vaccine Pfizer 30 mcg/0.3 ml 2021, 2021, 10/21/2021    DTP 1980, 1980, 1981, 1982    FLULAVAL 6 months & older 0.5 ml Prefilled syringe (04080) 2020, 2021    Influenza 2020, 2021, 2023    MMR 1982    OPV 1980, 1980, 1982    Pneumococcal Conjugate PCV20 2023    TDAP 2018, 05/15/2021   Deferred Date(s)  Deferred    Influenza Vaccine Refused 2020       PSH:  Past Surgical History:   Procedure Laterality Date    Colonoscopy N/A 2021    Procedure: COLONOSCOPY;  Surgeon: Jose Heard DO;  Location:  ENDOSCOPY    Colposcopy, cervix inc upper/adjacent vagina      Colposcopy,loop electrd cervix excis      Conization cervix,loop electrd      Salpingectomy Bilateral 2020    Laparoscopy    Tonsillectomy         Social History:  Social History     Socioeconomic History    Marital status: Single     Spouse name: Not on file    Number of children: Not on file    Years of education: Not on file    Highest education level: Not on file   Occupational History    Not on file   Tobacco Use    Smoking status: Former     Current packs/day: 0.00     Average packs/day: 0.5 packs/day for 24.0 years (12.0 ttl pk-yrs)     Types: Cigarettes     Start date: 1995     Quit date: 2019     Years since quittin.7    Smokeless tobacco: Never   Vaping Use    Vaping status: Never Used   Substance and Sexual Activity    Alcohol use: Yes     Comment: social    Drug use: No    Sexual activity: Not Currently   Other Topics Concern     Service Not Asked    Blood Transfusions Not Asked    Caffeine Concern Yes     Comment: 2 cups of coffee daily    Occupational Exposure Not Asked    Hobby Hazards Not Asked    Sleep Concern Not Asked    Stress Concern Not Asked    Weight Concern Not Asked    Special Diet Not Asked    Back Care Not Asked    Exercise No     Comment: Daily walking    Bike Helmet Not Asked    Seat Belt Yes    Self-Exams Not Asked   Social History Narrative    Not on file     Social Drivers of Health     Financial Resource Strain: Not on file   Food Insecurity: Not on file   Transportation Needs: Not on file   Physical Activity: Not on file   Stress: Not on file   Social Connections: Not on file   Housing Stability: Not on file         Patient feels unsafe or threatened?: denies    Abuse: denies physical,  sexual or mental.     Family History:  Family History   Problem Relation Age of Onset    Other (acute MI[other]) Maternal Grandfather     Heart Disorder Maternal Grandfather     Other (HTN[other]) Maternal Grandmother     Other (HTN[other]) Mother     Other (hypothyroidism[other]) Mother     Breast Cancer Mother         63 years ols    Stroke Father         59 years old       Health maintenance:  Mammogram (age 40 and q1-2yr): 06/2024  Impression   CONCLUSION:       BI-RADS CATEGORY:    DIAGNOSTIC CATEGORY 1--NEGATIVE NO CHANGE FROM COMPARISON ASSESSMENT.       RECOMMENDATIONS:    ROUTINE MAMMOGRAM AND CLINICAL EVALUATION IN 12 MONTHS.       Colonoscopy (age 45 and q10yr): 2021    Review of Systems:  General: no complaints per category. See HPI for additional information.   Breast: no complaints per category. See HPI for additional information.   Respiratory: no complaints per category. See HPI for additional information.   Cardiovascular: no complaints per category. See HPI for additional information.   GI: no complaints per category. See HPI for additional information.   : no complaints per category. See HPI for additional information.   Heme: no complaints per category. See HPI for additional information.       Objective:     Vitals:    10/10/24 1435   BP: 108/80   Pulse: 92   Weight: 130 lb (59 kg)   Height: 67\"         Body mass index is 20.36 kg/m².    General: AAO.NAD.   CVS exam: normal peripheral perfusion  Chest: non-labored breathing, no tachypnea   Breast:  symmetric, no dominant or suspicious mass, no skin or nipple changes and no axillary adenopathy  Abdominal exam:  soft, nontender, nondistended  Pelvic exam:   VULVA: normal appearing vulva with no masses, tenderness or lesions  PERINEUM:  normal appearing, no lesions   URETHRAL MEATUS:  normal appearing, no lesions   VAGINA: normal appearing vagina with normal color and discharge, no lesions  CERVIX: normal appearing cervix without discharge or  lesions  UTERUS: uterus is normal size, shape, consistency and nontender  ADNEXA: normal adnexa in size, nontender and no masses  PERIRECTAL: normal appearing, no lesions   Ext: non-tender, no edema    Assessment:     Karol Packer is a 44 year old  female here for a well women exam.       Plan:       Problem List Items Addressed This Visit    None  Visit Diagnoses       Well woman exam with routine gynecological exam    -  Primary    Encounter for screening mammogram for malignant neoplasm of breast        Relevant Orders    PINA GANGA 2D+3D SCREENING BILAT (CPT=77067/77649)    Screening for cervical cancer        Relevant Orders    ThinPrep PAP with HPV Reflex Request B    Bilateral ovarian cysts        Relevant Orders    Pelvic US Complete GYNE Only [[id number]]            Cervical cancer screening  - discussion held with the patient about ASCCP guidelines  - repeat pap smear today   Health maintenance  - encouraged to maintain weight at healthy BMI  - discussed importance of exercise and healthy eating  - self breast exam instructions provided   - bilateral screening mammogram recommendations discussed and order provided    Bilateral ovarian cyst  Incidentally noted on hip MRI.  No abnormal findings on physical exam.  Recommend pelvic ultrasound.  Precautions provided.  Patient agreeable.      Problem List Items Addressed This Visit    None  Visit Diagnoses       Well woman exam with routine gynecological exam    -  Primary    Encounter for screening mammogram for malignant neoplasm of breast        Relevant Orders    PINA GANGA 2D+3D SCREENING BILAT (CPT=77067/83318)    Screening for cervical cancer        Relevant Orders    ThinPrep PAP with HPV Reflex Request B    Bilateral ovarian cysts        Relevant Orders    Pelvic US Complete GYNE Only [[id number]]                  All of the findings and plan were discussed with the patient.  She notes understanding and agrees with the plan of care.  All  questions were answered to the best of my ability at this time.      RTC in 1 year for a well woman exam or sooner if needed     Bertha Velazquez MD   [facility] - OBGYN      Discussed with patient that there will not be further notification of normal or benign results other than receiving results on [facility]. A [employer] message or telephone call will be placed by the physician and/or office staff if results are abnormal.       Note to patient and family   The 21st Century Cures Act makes medical notes available to patients in the interest of transparency.  However, please be advised that this is a medical document.  It is intended as [id number]-yhvf communication.  It is written and medical language may contain abbreviations or verbiage that are technical and unfamiliar.  It may appear blunt or direct.  Medical documents are intended to carry relevant information, facts as evident, and the clinical opinion of the practitioner.      This note could include assistance by Dragon voice recognition. Errors in content may be related to improper recognition by the system; efforts to review and correct have been done but errors may still exist.            [1]   Current Outpatient Medications on File Prior to Visit   Medication Sig Dispense Refill    montelukast 10 MG Oral Tab Take 1 tablet (10 mg total) by mouth nightly. 90 tablet 1    hydroCHLOROthiazide 12.5 MG Oral Cap Take 1 capsule (12.5 mg total) by mouth every morning. 30 capsule 0    Fremanezumab-vfrm (AJOVY) 225 MG/1.5ML Subcutaneous Solution Auto-injector Inject 225 mg into the skin every 30 (thirty) days. 1 each 0    nortriptyline 10 MG Oral Cap Take 2 capsules (20 mg total) by mouth nightly. 60 capsule 2    SUMAtriptan 50 MG Oral Tab Use at onset; repeat once after 2 hours-ONLY 2 IN 24 HR MAX. This is a 30 day supply. 9 tablet 0    atomoxetine 60 MG Oral Cap Take 1 capsule (60 mg total) by mouth daily.      folic acid 1 MG Oral Tab Take 2 tablets (2 mg total) by mouth  daily. 180 tablet 1    Methotrexate Sodium, PF, 50 MG/2ML Injection Solution Inject 0.8 mL (20 mg total) into the skin once a week. Inject 20 mg as directed 4 each 2    gabapentin 300 MG Oral Cap Take 2 capsules (600 mg total) by mouth nightly. 60 capsule 5    gabapentin 100 MG Oral Cap Take 2 capsules at bedtime for a total of 800 mg nightly. 180 capsule 5    hydroxychloroquine 200 MG Oral Tab Take 2 tablets (400 mg total) by mouth daily. 180 tablet 1    cyclobenzaprine 10 MG Oral Tab Take 0.5-1 tablets (5-10 mg total) by mouth 2 (two) times daily as needed for Muscle spasms. 30 tablet 0    Syringe/Needle, Disp, (BD SAFETYGLIDE SYRINGE/NEEDLE) 27G X 5/8\" 1 ML Does not apply Misc To be used for Methotrexate injection once weekly 16 each 3    traZODone 50 MG Oral Tab Take 1 tablet (50 mg total) by mouth. 0.5-1 tablet by mouth nightly as needed      albuterol (PROAIR HFA) 108 (90 Base) MCG/ACT Inhalation Aero Soln Inhale 2 puffs into the lungs every 6 (six) hours as needed for Wheezing or Shortness of Breath (Cough). 1 each 0    benztropine 1 MG Oral Tab Take 1 tablet (1 mg total) by mouth 2 (two) times daily.      BD TB SYRINGE 25G X 5/8\" 1 ML Does not apply Misc       busPIRone HCl 15 MG Oral Tab Take by mouth 2 (two) times a day.        Cholecalciferol (VITAMIN D3) 125 MCG (5000 UT) Oral Tab Take 1 tablet (5,000 Units total) by mouth daily.      loratadine 10 MG Oral Tab Take 1 tablet (10 mg total) by mouth daily.      Sertraline HCl 100 MG Oral Tab Take 2 tablets (200 mg total) by mouth daily. Take 2 tab once a day      Fluticasone Propionate 50 MCG/ACT Nasal Suspension 2 sprays by Each Nare route daily. 1 Bottle 3     No current facility-administered medications on file prior to visit.   [2]   Allergies  Allergen Reactions    Ceclor HIVES     CAPS      Cefaclor HIVES     CAPS    Cellcept ANXIETY, CONFUSION, DIZZINESS, FATIGUE, OTHER (SEE COMMENTS), PAIN and SHORTNESS OF BREATH    Mycophenolate ANXIETY, CONFUSION,  DIZZINESS, FATIGUE, OTHER (SEE COMMENTS), PAIN and SHORTNESS OF BREATH     Other reaction(s): ANXIETY, CONFUSION, FATIGUE, OTHER (SEE COMMENTS), PAIN      Meloxicam OTHER (SEE COMMENTS)     Anxiety/Depression  Anxiety/Depression  Other reaction(s): OTHER (SEE COMMENTS)  Anxiety/Depression  Other reaction(s): OTHER (SEE COMMENTS)  Anxiety/Depression

## 2024-10-10 NOTE — TELEPHONE ENCOUNTER
Medication:  Fremanezumab-vfrm (AJOVY) 225 MG/1.5ML Subcutaneous Solution Auto-injector      Date of last refill: 09/02/2024 (#1 each/0)  [place]     Last office visit: 07/31/2024  Due back to clinic per last office note:  Around 11/30/2024  Date next office visit scheduled:    Future Appointments   Date Time Provider Department Center   10/10/2024  2:45 PM Bertha Velazquez MD [facility] [address]   10/15/2024  3:00 PM PFS XR RM1 PFS XRAY S [place]   10/15/2024  3:30 PM REF SO PFLD REF [facility]17 Ref PFLD 17   2/5/2025  1:40 PM Adriane Blanton MD [facility] [address]           Last OV note recommendation:    Discussion/Plan:  Migraines without aura  Topamax not effective. Recommend trial of nortriptyline 20mg (will then stop or lower trazodone)  If SE with nortriptyline, trial Depakote. Patient had tubal ligation, no chance of problems  Decrease trazodone to 50mg x 2 nights, then 25mg x 2-3 nights, then stop. Can add back 25 or 50mg if needed  Start magnesium threonate or glycinate, not more than 400mg nightly  Switch to fiorcet from sumatriptan as abortive  To know triggers and lifestyle behaviors including limiting caffeine intake, not skipping meals, having regular sleep schedule and practicing good sleep hygiene, avoid migraine food triggers/try to identify if any of them are triggers (nitrates, aged cheeses, red wine, chocolate, msg, artificial sweetener). Also discussed medication overuse headache including taking abortive medications, or any combination of them, on more than 2-3 days of the week, and to take abortive medication immediately at onset of headache     Nonspecific mild white matter linear, very small, left periventricular T2 hyperintensity- likely nonspecific microischemic white matter abnormality, possibly related to migraines. As not clear from imaging, and recommended by radiology, repeat MRI brain for comparison, to make sure no signs of  demyelinating disease, as clinically questioned. Work up for MS was otherwise negative. Repeat MRI brain.        Word finding difficulty- recheck B12 level, was 315 lower end in 9/2020. Improved since stopping topamax but still present occasionally

## 2024-10-10 NOTE — TELEPHONE ENCOUNTER
Medication:  SUMAtriptan 50 MG Oral Tab      Date of last refill: 08/12/2024 (#9/0)  [place]     Last office visit: 07/31/2024  Due back to clinic per last office note:  Around 11/30/2024  Date next office visit scheduled:    Future Appointments   Date Time Provider Department Center   10/10/2024  2:45 PM Bertha Velazquez MD [facility] [address]   10/15/2024  3:00 PM PFS XR RM1 PFS XRAY S [place]   10/15/2024  3:30 PM REF SO PFLD REF EM[facility]17 Ref PFLD 17   2/5/2025  1:40 PM Adriane Blanton MD [facility] [address]           Last OV note recommendation:    Discussion/Plan:  Migraines without aura  Topamax not effective. Recommend trial of nortriptyline 20mg (will then stop or lower trazodone)  If SE with nortriptyline, trial Depakote. Patient had tubal ligation, no chance of problems  Decrease trazodone to 50mg x 2 nights, then 25mg x 2-3 nights, then stop. Can add back 25 or 50mg if needed  Start magnesium threonate or glycinate, not more than 400mg nightly  Switch to fiorcet from sumatriptan as abortive  To know triggers and lifestyle behaviors including limiting caffeine intake, not skipping meals, having regular sleep schedule and practicing good sleep hygiene, avoid migraine food triggers/try to identify if any of them are triggers (nitrates, aged cheeses, red wine, chocolate, msg, artificial sweetener). Also discussed medication overuse headache including taking abortive medications, or any combination of them, on more than 2-3 days of the week, and to take abortive medication immediately at onset of headache     Nonspecific mild white matter linear, very small, left periventricular T2 hyperintensity- likely nonspecific microischemic white matter abnormality, possibly related to migraines. As not clear from imaging, and recommended by radiology, repeat MRI brain for comparison, to make sure no signs of demyelinating disease, as clinically questioned. Work up for  MS was otherwise negative. Repeat MRI brain.        Word finding difficulty- recheck B12 level, was 315 lower end in 9/2020. Improved since stopping topamax but still present occasionally

## 2024-10-15 ENCOUNTER — HOSPITAL ENCOUNTER (OUTPATIENT)
Dept: GENERAL RADIOLOGY | Age: 44
Discharge: HOME | End: 2024-10-15
Attending: FAMILY MEDICINE
Payer: COMMERCIAL

## 2024-10-15 ENCOUNTER — LAB ENCOUNTER (OUTPATIENT)
Dept: LAB | Age: 44
End: 2024-10-15
Attending: OBSTETRICS & GYNECOLOGY
Payer: COMMERCIAL

## 2024-10-15 DIAGNOSIS — D84.9 IMMUNOSUPPRESSED STATUS (HCC): ICD-10-CM

## 2024-10-15 DIAGNOSIS — M79.605 BILATERAL LEG AND FOOT PAIN: ICD-10-CM

## 2024-10-15 DIAGNOSIS — Z00.00 ROUTINE GENERAL MEDICAL EXAMINATION AT A HEALTH CARE FACILITY: ICD-10-CM

## 2024-10-15 DIAGNOSIS — M79.671 BILATERAL LEG AND FOOT PAIN: ICD-10-CM

## 2024-10-15 DIAGNOSIS — R60.0 BILATERAL LOWER EXTREMITY EDEMA: ICD-10-CM

## 2024-10-15 DIAGNOSIS — M35.01 SJOGREN'S SYNDROME WITH KERATOCONJUNCTIVITIS SICCA (HCC): ICD-10-CM

## 2024-10-15 DIAGNOSIS — M79.604 BILATERAL LEG AND FOOT PAIN: ICD-10-CM

## 2024-10-15 DIAGNOSIS — M06.09 RHEUMATOID ARTHRITIS OF MULTIPLE SITES WITHOUT RHEUMATOID FACTOR (HCC): ICD-10-CM

## 2024-10-15 DIAGNOSIS — M06.09 RHEUMATOID ARTHRITIS OF MULTIPLE SITES WITH NEGATIVE RHEUMATOID FACTOR (HCC): ICD-10-CM

## 2024-10-15 DIAGNOSIS — M79.672 BILATERAL LEG AND FOOT PAIN: ICD-10-CM

## 2024-10-15 DIAGNOSIS — M79.7 FIBROMYALGIA: ICD-10-CM

## 2024-10-15 DIAGNOSIS — Z51.81 THERAPEUTIC DRUG MONITORING: ICD-10-CM

## 2024-10-15 LAB
.: NORMAL
.: NORMAL
ALBUMIN SERPL-MCNC: 4.9 G/DL (ref 3.2–4.8)
ALBUMIN/GLOB SERPL: 2.5 {RATIO} (ref 1–2)
ALP LIVER SERPL-CCNC: 72 U/L
ALT SERPL-CCNC: 20 U/L
ANION GAP SERPL CALC-SCNC: 3 MMOL/L (ref 0–18)
AST SERPL-CCNC: 27 U/L (ref ?–34)
BASOPHILS # BLD AUTO: 0.05 X10(3) UL (ref 0–0.2)
BASOPHILS NFR BLD AUTO: 0.7 %
BILIRUB SERPL-MCNC: 0.5 MG/DL (ref 0.3–1.2)
BUN BLD-MCNC: 13 MG/DL (ref 9–23)
CALCIUM BLD-MCNC: 9.7 MG/DL (ref 8.7–10.4)
CHLORIDE SERPL-SCNC: 104 MMOL/L (ref 98–112)
CHOLEST SERPL-MCNC: 157 MG/DL (ref ?–200)
CO2 SERPL-SCNC: 33 MMOL/L (ref 21–32)
CREAT BLD-MCNC: 0.69 MG/DL
EGFRCR SERPLBLD CKD-EPI 2021: 110 ML/MIN/1.73M2 (ref 60–?)
EOSINOPHIL # BLD AUTO: 0.29 X10(3) UL (ref 0–0.7)
EOSINOPHIL NFR BLD AUTO: 4.3 %
ERYTHROCYTE [DISTWIDTH] IN BLOOD BY AUTOMATED COUNT: 13.9 %
ERYTHROCYTE [SEDIMENTATION RATE] IN BLOOD: 4 MM/HR
FASTING PATIENT LIPID ANSWER: NO
FASTING STATUS PATIENT QL REPORTED: NO
GLOBULIN PLAS-MCNC: 2 G/DL (ref 2–3.5)
GLUCOSE BLD-MCNC: 91 MG/DL (ref 70–99)
HCT VFR BLD AUTO: 41.2 %
HDLC SERPL-MCNC: 66 MG/DL (ref 40–59)
HGB BLD-MCNC: 13.7 G/DL
IMM GRANULOCYTES # BLD AUTO: 0.02 X10(3) UL (ref 0–1)
IMM GRANULOCYTES NFR BLD: 0.3 %
LDLC SERPL CALC-MCNC: 81 MG/DL (ref ?–100)
LYMPHOCYTES # BLD AUTO: 2.52 X10(3) UL (ref 1–4)
LYMPHOCYTES NFR BLD AUTO: 37.1 %
MCH RBC QN AUTO: 33.2 PG (ref 26–34)
MCHC RBC AUTO-ENTMCNC: 33.3 G/DL (ref 31–37)
MCV RBC AUTO: 99.8 FL
MONOCYTES # BLD AUTO: 0.52 X10(3) UL (ref 0.1–1)
MONOCYTES NFR BLD AUTO: 7.6 %
NEUTROPHILS # BLD AUTO: 3.4 X10 (3) UL (ref 1.5–7.7)
NEUTROPHILS # BLD AUTO: 3.4 X10(3) UL (ref 1.5–7.7)
NEUTROPHILS NFR BLD AUTO: 50 %
NONHDLC SERPL-MCNC: 91 MG/DL (ref ?–130)
OSMOLALITY SERPL CALC.SUM OF ELEC: 290 MOSM/KG (ref 275–295)
PLATELET # BLD AUTO: 271 10(3)UL (ref 150–450)
POTASSIUM SERPL-SCNC: 3.6 MMOL/L (ref 3.5–5.1)
PROT SERPL-MCNC: 6.9 G/DL (ref 5.7–8.2)
RBC # BLD AUTO: 4.13 X10(6)UL
SODIUM SERPL-SCNC: 140 MMOL/L (ref 136–145)
T4 FREE SERPL-MCNC: 1.2 NG/DL (ref 0.8–1.7)
TRIGL SERPL-MCNC: 49 MG/DL (ref 30–149)
TSI SER-ACNC: 1.56 MIU/ML (ref 0.55–4.78)
VLDLC SERPL CALC-MCNC: 8 MG/DL (ref 0–30)
WBC # BLD AUTO: 6.8 X10(3) UL (ref 4–11)

## 2024-10-15 PROCEDURE — 86160 COMPLEMENT ANTIGEN: CPT

## 2024-10-15 PROCEDURE — 84443 ASSAY THYROID STIM HORMONE: CPT

## 2024-10-15 PROCEDURE — 85025 COMPLETE CBC W/AUTO DIFF WBC: CPT

## 2024-10-15 PROCEDURE — 86225 DNA ANTIBODY NATIVE: CPT

## 2024-10-15 PROCEDURE — 84439 ASSAY OF FREE THYROXINE: CPT

## 2024-10-15 PROCEDURE — 80053 COMPREHEN METABOLIC PANEL: CPT

## 2024-10-15 PROCEDURE — 36415 COLL VENOUS BLD VENIPUNCTURE: CPT

## 2024-10-15 PROCEDURE — 85652 RBC SED RATE AUTOMATED: CPT

## 2024-10-15 PROCEDURE — 73630 X-RAY EXAM OF FOOT: CPT | Performed by: FAMILY MEDICINE

## 2024-10-15 PROCEDURE — 80061 LIPID PANEL: CPT

## 2024-10-16 LAB
C3 SERPL-MCNC: 88.7 MG/DL (ref 90–170)
C4 SERPL-MCNC: 17.1 MG/DL (ref 12–36)
DSDNA IGG SERPL IA-ACNC: 1.9 IU/ML
HPV E6+E7 MRNA CVX QL NAA+PROBE: POSITIVE

## 2024-10-17 ENCOUNTER — ULTRASOUND ENCOUNTER (OUTPATIENT)
Dept: OBGYN CLINIC | Facility: CLINIC | Age: 44
End: 2024-10-17
Payer: COMMERCIAL

## 2024-10-17 DIAGNOSIS — N83.202 BILATERAL OVARIAN CYSTS: ICD-10-CM

## 2024-10-17 DIAGNOSIS — N83.201 BILATERAL OVARIAN CYSTS: ICD-10-CM

## 2024-10-17 LAB
HPV16 DNA CVX QL PROBE+SIG AMP: NEGATIVE
HPV18 DNA CVX QL PROBE+SIG AMP: NEGATIVE

## 2024-10-17 PROCEDURE — 76856 US EXAM PELVIC COMPLETE: CPT | Performed by: OBSTETRICS & GYNECOLOGY

## 2024-10-17 PROCEDURE — 76830 TRANSVAGINAL US NON-OB: CPT | Performed by: OBSTETRICS & GYNECOLOGY

## 2024-10-23 DIAGNOSIS — R60.0 BILATERAL LOWER EXTREMITY EDEMA: ICD-10-CM

## 2024-10-23 RX ORDER — HYDROCHLOROTHIAZIDE 12.5 MG/1
12.5 CAPSULE ORAL EVERY MORNING
Qty: 30 CAPSULE | Refills: 0 | Status: SHIPPED | OUTPATIENT
Start: 2024-10-23

## 2024-11-13 ENCOUNTER — ANESTHESIA EVENT (OUTPATIENT)
Dept: SURGERY | Facility: HOSPITAL | Age: 44
End: 2024-11-13
Payer: COMMERCIAL

## 2024-11-14 ENCOUNTER — HOSPITAL ENCOUNTER (OUTPATIENT)
Facility: HOSPITAL | Age: 44
Setting detail: HOSPITAL OUTPATIENT SURGERY
Discharge: HOME | End: 2024-11-14
Attending: ORTHOPAEDIC SURGERY | Admitting: ORTHOPAEDIC SURGERY
Payer: COMMERCIAL

## 2024-11-14 ENCOUNTER — ANESTHESIA (OUTPATIENT)
Dept: SURGERY | Facility: HOSPITAL | Age: 44
End: 2024-11-14
Payer: COMMERCIAL

## 2024-11-14 ENCOUNTER — APPOINTMENT (OUTPATIENT)
Dept: GENERAL RADIOLOGY | Facility: HOSPITAL | Age: 44
End: 2024-11-14
Attending: ORTHOPAEDIC SURGERY
Payer: COMMERCIAL

## 2024-11-14 VITALS
HEIGHT: 67 IN | BODY MASS INDEX: 20.9 KG/M2 | HEART RATE: 79 BPM | OXYGEN SATURATION: 97 % | RESPIRATION RATE: 16 BRPM | SYSTOLIC BLOOD PRESSURE: 116 MMHG | WEIGHT: 133.19 LBS | TEMPERATURE: 98 F | DIASTOLIC BLOOD PRESSURE: 69 MMHG

## 2024-11-14 DIAGNOSIS — S73.191A TEAR OF RIGHT ACETABULAR LABRUM, INITIAL ENCOUNTER: Primary | ICD-10-CM

## 2024-11-14 LAB — B-HCG UR QL: NEGATIVE

## 2024-11-14 PROCEDURE — 76000 FLUOROSCOPY <1 HR PHYS/QHP: CPT | Performed by: ORTHOPAEDIC SURGERY

## 2024-11-14 PROCEDURE — 0SQ94ZZ REPAIR RIGHT HIP JOINT, PERCUTANEOUS ENDOSCOPIC APPROACH: ICD-10-PCS | Performed by: ORTHOPAEDIC SURGERY

## 2024-11-14 PROCEDURE — 81025 URINE PREGNANCY TEST: CPT

## 2024-11-14 DEVICE — FG, CINCHLOCK SS ANCHOR WITH INSERTER
Type: IMPLANTABLE DEVICE | Site: HIP | Status: FUNCTIONAL
Brand: CINCHLOCK

## 2024-11-14 RX ORDER — DIAZEPAM 10 MG/2ML
5 INJECTION, SOLUTION INTRAMUSCULAR; INTRAVENOUS EVERY 6 HOURS PRN
Status: DISCONTINUED | OUTPATIENT
Start: 2024-11-14 | End: 2024-11-14

## 2024-11-14 RX ORDER — LOSARTAN POTASSIUM 25 MG/1
25 TABLET ORAL DAILY
Qty: 14 TABLET | Refills: 0 | Status: SHIPPED | OUTPATIENT
Start: 2024-11-14

## 2024-11-14 RX ORDER — HYDROCODONE BITARTRATE AND ACETAMINOPHEN 10; 325 MG/1; MG/1
2 TABLET ORAL ONCE AS NEEDED
Status: COMPLETED | OUTPATIENT
Start: 2024-11-14 | End: 2024-11-14

## 2024-11-14 RX ORDER — NALOXONE HYDROCHLORIDE 0.4 MG/ML
80 INJECTION, SOLUTION INTRAMUSCULAR; INTRAVENOUS; SUBCUTANEOUS AS NEEDED
Status: DISCONTINUED | OUTPATIENT
Start: 2024-11-14 | End: 2024-11-14

## 2024-11-14 RX ORDER — ROCURONIUM BROMIDE 10 MG/ML
INJECTION, SOLUTION INTRAVENOUS AS NEEDED
Status: DISCONTINUED | OUTPATIENT
Start: 2024-11-14 | End: 2024-11-14 | Stop reason: SURG

## 2024-11-14 RX ORDER — TRANEXAMIC ACID 10 MG/ML
INJECTION, SOLUTION INTRAVENOUS AS NEEDED
Status: DISCONTINUED | OUTPATIENT
Start: 2024-11-14 | End: 2024-11-14 | Stop reason: SURG

## 2024-11-14 RX ORDER — KETOROLAC TROMETHAMINE 30 MG/ML
INJECTION, SOLUTION INTRAMUSCULAR; INTRAVENOUS AS NEEDED
Status: DISCONTINUED | OUTPATIENT
Start: 2024-11-14 | End: 2024-11-14 | Stop reason: SURG

## 2024-11-14 RX ORDER — ONDANSETRON 4 MG/1
4 TABLET, FILM COATED ORAL EVERY 8 HOURS PRN
Qty: 16 TABLET | Refills: 0 | Status: SHIPPED | OUTPATIENT
Start: 2024-11-14

## 2024-11-14 RX ORDER — LIDOCAINE HYDROCHLORIDE 10 MG/ML
INJECTION, SOLUTION EPIDURAL; INFILTRATION; INTRACAUDAL; PERINEURAL AS NEEDED
Status: DISCONTINUED | OUTPATIENT
Start: 2024-11-14 | End: 2024-11-14 | Stop reason: SURG

## 2024-11-14 RX ORDER — LABETALOL HYDROCHLORIDE 5 MG/ML
5 INJECTION, SOLUTION INTRAVENOUS EVERY 5 MIN PRN
Status: DISCONTINUED | OUTPATIENT
Start: 2024-11-14 | End: 2024-11-14

## 2024-11-14 RX ORDER — ALBUTEROL SULFATE 0.83 MG/ML
2.5 SOLUTION RESPIRATORY (INHALATION) AS NEEDED
Status: DISCONTINUED | OUTPATIENT
Start: 2024-11-14 | End: 2024-11-14

## 2024-11-14 RX ORDER — ONDANSETRON 2 MG/ML
4 INJECTION INTRAMUSCULAR; INTRAVENOUS EVERY 6 HOURS PRN
Status: DISCONTINUED | OUTPATIENT
Start: 2024-11-14 | End: 2024-11-14

## 2024-11-14 RX ORDER — SODIUM CHLORIDE, SODIUM LACTATE, POTASSIUM CHLORIDE, CALCIUM CHLORIDE 600; 310; 30; 20 MG/100ML; MG/100ML; MG/100ML; MG/100ML
INJECTION, SOLUTION INTRAVENOUS CONTINUOUS
Status: DISCONTINUED | OUTPATIENT
Start: 2024-11-14 | End: 2024-11-14

## 2024-11-14 RX ORDER — HYDROMORPHONE HYDROCHLORIDE 1 MG/ML
0.2 INJECTION, SOLUTION INTRAMUSCULAR; INTRAVENOUS; SUBCUTANEOUS EVERY 5 MIN PRN
Status: DISCONTINUED | OUTPATIENT
Start: 2024-11-14 | End: 2024-11-14

## 2024-11-14 RX ORDER — NEOSTIGMINE METHYLSULFATE 1 MG/ML
INJECTION INTRAVENOUS AS NEEDED
Status: DISCONTINUED | OUTPATIENT
Start: 2024-11-14 | End: 2024-11-14 | Stop reason: SURG

## 2024-11-14 RX ORDER — DIPHENHYDRAMINE HYDROCHLORIDE 50 MG/ML
12.5 INJECTION INTRAMUSCULAR; INTRAVENOUS AS NEEDED
Status: DISCONTINUED | OUTPATIENT
Start: 2024-11-14 | End: 2024-11-14

## 2024-11-14 RX ORDER — ACETAMINOPHEN 500 MG
1000 TABLET ORAL ONCE AS NEEDED
Status: COMPLETED | OUTPATIENT
Start: 2024-11-14 | End: 2024-11-14

## 2024-11-14 RX ORDER — MEPERIDINE HYDROCHLORIDE 25 MG/ML
12.5 INJECTION INTRAMUSCULAR; INTRAVENOUS; SUBCUTANEOUS AS NEEDED
Status: DISCONTINUED | OUTPATIENT
Start: 2024-11-14 | End: 2024-11-14

## 2024-11-14 RX ORDER — BUPIVACAINE HYDROCHLORIDE AND EPINEPHRINE 5; 5 MG/ML; UG/ML
INJECTION, SOLUTION EPIDURAL; INTRACAUDAL; PERINEURAL AS NEEDED
Status: DISCONTINUED | OUTPATIENT
Start: 2024-11-14 | End: 2024-11-14 | Stop reason: HOSPADM

## 2024-11-14 RX ORDER — HYDROCODONE BITARTRATE AND ACETAMINOPHEN 10; 325 MG/1; MG/1
1 TABLET ORAL ONCE AS NEEDED
Status: COMPLETED | OUTPATIENT
Start: 2024-11-14 | End: 2024-11-14

## 2024-11-14 RX ORDER — MIDAZOLAM HYDROCHLORIDE 1 MG/ML
INJECTION INTRAMUSCULAR; INTRAVENOUS AS NEEDED
Status: DISCONTINUED | OUTPATIENT
Start: 2024-11-14 | End: 2024-11-14 | Stop reason: SURG

## 2024-11-14 RX ORDER — DEXAMETHASONE SODIUM PHOSPHATE 4 MG/ML
VIAL (ML) INJECTION AS NEEDED
Status: DISCONTINUED | OUTPATIENT
Start: 2024-11-14 | End: 2024-11-14 | Stop reason: SURG

## 2024-11-14 RX ORDER — CEFAZOLIN SODIUM 1 G/3ML
INJECTION, POWDER, FOR SOLUTION INTRAMUSCULAR; INTRAVENOUS AS NEEDED
Status: DISCONTINUED | OUTPATIENT
Start: 2024-11-14 | End: 2024-11-14 | Stop reason: SURG

## 2024-11-14 RX ORDER — GLYCOPYRROLATE 0.2 MG/ML
INJECTION, SOLUTION INTRAMUSCULAR; INTRAVENOUS AS NEEDED
Status: DISCONTINUED | OUTPATIENT
Start: 2024-11-14 | End: 2024-11-14 | Stop reason: SURG

## 2024-11-14 RX ORDER — ONDANSETRON 2 MG/ML
INJECTION INTRAMUSCULAR; INTRAVENOUS AS NEEDED
Status: DISCONTINUED | OUTPATIENT
Start: 2024-11-14 | End: 2024-11-14 | Stop reason: SURG

## 2024-11-14 RX ORDER — HYDROCODONE BITARTRATE AND ACETAMINOPHEN 5; 325 MG/1; MG/1
1 TABLET ORAL EVERY 6 HOURS PRN
Qty: 16 TABLET | Refills: 0 | Status: SHIPPED | OUTPATIENT
Start: 2024-11-14

## 2024-11-14 RX ORDER — HYDROMORPHONE HYDROCHLORIDE 1 MG/ML
INJECTION, SOLUTION INTRAMUSCULAR; INTRAVENOUS; SUBCUTANEOUS
Status: COMPLETED
Start: 2024-11-14 | End: 2024-11-14

## 2024-11-14 RX ORDER — MIDAZOLAM HYDROCHLORIDE 1 MG/ML
1 INJECTION INTRAMUSCULAR; INTRAVENOUS EVERY 5 MIN PRN
Status: DISCONTINUED | OUTPATIENT
Start: 2024-11-14 | End: 2024-11-14

## 2024-11-14 RX ORDER — PROCHLORPERAZINE EDISYLATE 5 MG/ML
5 INJECTION INTRAMUSCULAR; INTRAVENOUS EVERY 8 HOURS PRN
Status: DISCONTINUED | OUTPATIENT
Start: 2024-11-14 | End: 2024-11-14

## 2024-11-14 RX ORDER — ACETAMINOPHEN 500 MG
1000 TABLET ORAL ONCE
Status: DISCONTINUED | OUTPATIENT
Start: 2024-11-14 | End: 2024-11-14 | Stop reason: HOSPADM

## 2024-11-14 RX ORDER — TRANEXAMIC ACID 10 MG/ML
1000 INJECTION, SOLUTION INTRAVENOUS ONCE
Status: DISCONTINUED | OUTPATIENT
Start: 2024-11-14 | End: 2024-11-14 | Stop reason: HOSPADM

## 2024-11-14 RX ORDER — SCOLOPAMINE TRANSDERMAL SYSTEM 1 MG/1
1 PATCH, EXTENDED RELEASE TRANSDERMAL ONCE
Status: DISCONTINUED | OUTPATIENT
Start: 2024-11-14 | End: 2024-11-14 | Stop reason: HOSPADM

## 2024-11-14 RX ORDER — KETAMINE HYDROCHLORIDE 50 MG/ML
INJECTION, SOLUTION INTRAMUSCULAR; INTRAVENOUS AS NEEDED
Status: DISCONTINUED | OUTPATIENT
Start: 2024-11-14 | End: 2024-11-14 | Stop reason: SURG

## 2024-11-14 RX ORDER — HYDROMORPHONE HYDROCHLORIDE 1 MG/ML
0.4 INJECTION, SOLUTION INTRAMUSCULAR; INTRAVENOUS; SUBCUTANEOUS EVERY 5 MIN PRN
Status: DISCONTINUED | OUTPATIENT
Start: 2024-11-14 | End: 2024-11-14

## 2024-11-14 RX ORDER — HYDROMORPHONE HYDROCHLORIDE 1 MG/ML
0.6 INJECTION, SOLUTION INTRAMUSCULAR; INTRAVENOUS; SUBCUTANEOUS EVERY 5 MIN PRN
Status: DISCONTINUED | OUTPATIENT
Start: 2024-11-14 | End: 2024-11-14

## 2024-11-14 RX ORDER — ASPIRIN 325 MG
325 TABLET, DELAYED RELEASE (ENTERIC COATED) ORAL EVERY 6 HOURS PRN
Qty: 21 TABLET | Refills: 0 | Status: SHIPPED | OUTPATIENT
Start: 2024-11-14

## 2024-11-14 RX ADMIN — KETOROLAC TROMETHAMINE 30 MG: 30 INJECTION, SOLUTION INTRAMUSCULAR; INTRAVENOUS at 08:44:00

## 2024-11-14 RX ADMIN — CEFAZOLIN SODIUM 2 G: 1 INJECTION, POWDER, FOR SOLUTION INTRAMUSCULAR; INTRAVENOUS at 07:23:00

## 2024-11-14 RX ADMIN — LIDOCAINE HYDROCHLORIDE 50 MG: 10 INJECTION, SOLUTION EPIDURAL; INFILTRATION; INTRACAUDAL; PERINEURAL at 07:12:00

## 2024-11-14 RX ADMIN — MIDAZOLAM HYDROCHLORIDE 2 MG: 1 INJECTION INTRAMUSCULAR; INTRAVENOUS at 07:11:00

## 2024-11-14 RX ADMIN — SODIUM CHLORIDE, SODIUM LACTATE, POTASSIUM CHLORIDE, CALCIUM CHLORIDE: 600; 310; 30; 20 INJECTION, SOLUTION INTRAVENOUS at 07:06:00

## 2024-11-14 RX ADMIN — ROCURONIUM BROMIDE 50 MG: 10 INJECTION, SOLUTION INTRAVENOUS at 07:13:00

## 2024-11-14 RX ADMIN — GLYCOPYRROLATE 0.4 MG: 0.2 INJECTION, SOLUTION INTRAMUSCULAR; INTRAVENOUS at 08:47:00

## 2024-11-14 RX ADMIN — NEOSTIGMINE METHYLSULFATE 3 MG: 1 INJECTION INTRAVENOUS at 08:47:00

## 2024-11-14 RX ADMIN — ONDANSETRON 4 MG: 2 INJECTION INTRAMUSCULAR; INTRAVENOUS at 08:47:00

## 2024-11-14 RX ADMIN — TRANEXAMIC ACID 1000 MG: 10 INJECTION, SOLUTION INTRAVENOUS at 07:18:00

## 2024-11-14 RX ADMIN — KETAMINE HYDROCHLORIDE 20 MG: 50 INJECTION, SOLUTION INTRAMUSCULAR; INTRAVENOUS at 07:11:00

## 2024-11-14 RX ADMIN — DEXAMETHASONE SODIUM PHOSPHATE 8 MG: 4 MG/ML VIAL (ML) INJECTION at 07:18:00

## 2024-11-14 NOTE — SPIRITUAL CARE NOTE
Spiritual Care Visit Note    Patient Name: Karol Packer Date of Spiritual Care Visit: 24   : 1980 Primary Dx: <principal problem not specified>       Referred By:      Spiritual Care Taxonomy:    Intended Effects: Build relationship of care and support    Methods: Encourage self reflection;Offer spiritual/Spiritism support    Interventions: Share words of hope and inspiration    Visit Type/Summary:     - Spiritual Care: Tucker has support of two daughters, which each sharing their role in Tucker's healthcare and decisions.  normalized the situation. All laughed, and felt positive about outcomes today.   remains available as needed for follow up.    Spiritual Care support can be requested via an Epic consult. For urgent/immediate needs, please contact the On Call  at: [place]: ext [phone]

## 2024-11-14 NOTE — ANESTHESIA POSTPROCEDURE EVALUATION
[facility]    Karol Packer Patient Status:  Hospital Outpatient Surgery   Age/Gender 44 year old female MRN [id number]   Location [facility] POST ANESTHESIA CARE UNIT Attending Zbigniew Arnold MD   Hosp Day # 0 PCP Leni Schumacher DO       Anesthesia Post-op Note    RIGHT HIP ARTHROSCOPY, LABRAL REPAIR, CAPSULAR CLOSURE    Procedure Summary       Date: 11/14/24 Room / Location:  MAIN OR 12 /  MAIN OR    Anesthesia Start: 0706 Anesthesia Stop:     Procedure: RIGHT HIP ARTHROSCOPY, LABRAL REPAIR, CAPSULAR CLOSURE (Right: Hip) Diagnosis: (A TEAR- RIGHT ACETABULAR LABRUMFEMOROACETABULAR IMPINGEMENT RIGHT HIP)    Surgeons: Zbigniew Arnold MD Anesthesiologist: Christopher Amanda MD    Anesthesia Type: general ASA Status: 2            Anesthesia Type: general    Vitals Value Taken Time   /72 11/14/24 0901   Temp 97.1 degrees F 11/14/24 0901   Pulse 99 11/14/24 0901   Resp 22 11/14/24 0901   SpO2 93 % 11/14/24 0901   Vitals shown include unfiled device data.    Patient Location: PACU    Anesthesia Type: general    Airway Patency: patent and extubated    Postop Pain Control: adequate    Mental Status: mildly sedated but able to meaningfully participate in the post-anesthesia evaluation    Nausea/Vomiting: none    Cardiopulmonary/Hydration status: stable euvolemic    Complications: no apparent anesthesia related complications    Postop vital signs: stable    Dental Exam: Unchanged from Preop    Patient to be discharged from PACU when criteria met.           Problem: Potential for Falls  Goal: Patient will remain free of falls  INTERVENTIONS:  - Assess patient frequently for physical needs  -  Identify cognitive and physical deficits and behaviors that affect risk of falls    -  [place] fall precautions as indicated by assessment   - Educate patient/family on patient safety including physical limitations  - Instruct patient to call for assistance with activity based on assessment  - Modify environment to reduce risk of injury  - Consider OT/PT consult to assist with strengthening/mobility   Outcome: Progressing

## 2024-11-14 NOTE — ANESTHESIA PREPROCEDURE EVALUATION
PRE-OP EVALUATION    Patient Name: Karol Packer    Admit Diagnosis: A TEAR- RIGHT ACETABULAR LABRUM  FEMOROACETABULAR IMPINGEMENT RIGHT HIP    Pre-op Diagnosis: A TEAR- RIGHT ACETABULAR LABRUM  FEMOROACETABULAR IMPINGEMENT RIGHT HIP    RIGHT HIP ARTHROSCOPY, LABRAL REPAIR, CAPSULAR CLOSURE    Anesthesia Procedure: RIGHT HIP ARTHROSCOPY, LABRAL REPAIR, CAPSULAR CLOSURE (Right)    Surgeons and Role:     * Zbigniew Arnold MD - Primary    Pre-op vitals reviewed.  Temp: 99.1 °F (37.3 °C)  Pulse: 94  Resp: 16  BP: 108/75  SpO2: 98 %  Body mass index is 20.86 kg/m².    Current medications reviewed.  Hospital Medications:   [Transfer Hold] acetaminophen (Tylenol Extra Strength) tab 1,000 mg  1,000 mg Oral Once    [Transfer Hold] scopolamine (Transderm-Scop) 1 MG/3DAYS patch 1 patch  1 patch Transdermal Once    lactated ringers infusion   Intravenous Continuous    [Transfer Hold] tranexamic acid in sodium chloride 0.7% (Cyklokapron) 1000 mg/100mL infusion premix 1,000 mg  1,000 mg Intravenous Once    vancomycin (Vancocin) 1,000 mg in sodium chloride 0.9% 250 mL IVPB-ADDV  15 mg/kg Intravenous Once    And    gentamicin (Garamycin) 300 mg in sodium chloride 0.9% 100 mL IVPB  5 mg/kg Intravenous Once       Outpatient Medications:   Prescriptions Prior to Admission[1]    Allergies: Ceclor, Cefaclor, Cellcept, Mycophenolate, and Meloxicam      Anesthesia Evaluation    Patient summary reviewed.    Anesthetic Complications  (-) history of anesthetic complications         GI/Hepatic/Renal      (+) GERD                           Cardiovascular        Exercise tolerance: good     MET: >4                             (-) angina              Endo/Other                                  Pulmonary      (+) asthma         (-) shortness of breath            Neuro/Psych      (+) depression           (+) neuromuscular disease                     Past Surgical History:   Procedure Laterality Date    Colonoscopy N/A 6/2/2021    Procedure:  COLONOSCOPY;  Surgeon: Jose Heard DO;  Location:  ENDOSCOPY    Colposcopy, cervix inc upper/adjacent vagina      Colposcopy,loop electrd cervix excis      Conization cervix,loop electrd      Salpingectomy Bilateral 2020    Laparoscopy    Tonsillectomy       Social History     Socioeconomic History    Marital status: Single   Tobacco Use    Smoking status: Former     Current packs/day: 0.00     Average packs/day: 0.5 packs/day for 24.0 years (12.0 ttl pk-yrs)     Types: Cigarettes     Start date: 1995     Quit date: 2019     Years since quittin.8    Smokeless tobacco: Never   Vaping Use    Vaping status: Never Used   Substance and Sexual Activity    Alcohol use: Yes     Comment: social    Drug use: No    Sexual activity: Not Currently   Other Topics Concern    Caffeine Concern Yes     Comment: 2 cups of coffee daily    Exercise No     Comment: Daily walking    Seat Belt Yes     History   Drug Use No     Available pre-op labs reviewed.  Lab Results   Component Value Date    WBC 6.8 10/15/2024    RBC 4.13 10/15/2024    HGB 13.7 10/15/2024    HCT 41.2 10/15/2024    MCV 99.8 10/15/2024    MCH 33.2 10/15/2024    MCHC 33.3 10/15/2024    RDW 13.9 10/15/2024    .0 10/15/2024     Lab Results   Component Value Date     10/15/2024    K 3.6 10/15/2024     10/15/2024    CO2 33.0 (H) 10/15/2024    BUN 13 10/15/2024    CREATSERUM 0.69 10/15/2024    GLU 91 10/15/2024    CA 9.7 10/15/2024            Airway      Mallampati: II  Mouth opening: <3 FB  TM distance: 4 - 6 cm  Neck ROM: full Cardiovascular      Rhythm: regular  Rate: normal     Dental             Pulmonary      Breath sounds clear to auscultation bilaterally.               Other findings              ASA: 2   Plan: general  NPO status verified and patient meets guidelines.    Post-procedure pain management plan discussed with surgeon and patient.      Plan/risks discussed with: patient and child/children                Present  on Admission:  **None**             [1]   Facility-Administered Medications Prior to Admission   Medication Dose Route Frequency Provider Last Rate Last Admin    [COMPLETED] ketorolac (Toradol) 30 MG/ML injection 30 mg  30 mg Intramuscular Once    30 mg at 08/23/24 1304    [COMPLETED] triamcinolone acetonide (Kenalog-40) 40 MG/ML injection 40 mg  40 mg Intra-articular Once    40 mg at 08/23/24 1304     Medications Prior to Admission   Medication Sig Dispense Refill Last Dose/Taking    HYDROCHLOROTHIAZIDE 12.5 MG Oral Cap TAKE 1 CAPSULE BY MOUTH EVERY DAY IN THE MORNING 30 capsule 0 11/13/2024 at  7:00 AM    SUMAtriptan 50 MG Oral Tab Use at onset; repeat once after 2 hours-ONLY 2 IN 24 HR MAX. This is a 30 day supply. 9 tablet 0 11/13/2024 at  7:00 PM    montelukast 10 MG Oral Tab Take 1 tablet (10 mg total) by mouth nightly. 90 tablet 1 11/13/2024 at  5:00 AM    nortriptyline 10 MG Oral Cap Take 2 capsules (20 mg total) by mouth nightly. 60 capsule 2 11/13/2024 at  7:00 AM    atomoxetine 60 MG Oral Cap Take 1 capsule (60 mg total) by mouth daily.   11/14/2024 at  5:00 AM    folic acid 1 MG Oral Tab Take 2 tablets (2 mg total) by mouth daily. 180 tablet 1 11/13/2024 at  7:00 AM    Methotrexate Sodium, PF, 50 MG/2ML Injection Solution Inject 0.8 mL (20 mg total) into the skin once a week. Inject 20 mg as directed 4 each 2 10/24/2024    gabapentin 300 MG Oral Cap Take 2 capsules (600 mg total) by mouth nightly. 60 capsule 5 11/13/2024 at  7:00 PM    gabapentin 100 MG Oral Cap Take 2 capsules at bedtime for a total of 800 mg nightly. 180 capsule 5 11/13/2024 at  7:00 PM    hydroxychloroquine 200 MG Oral Tab Take 2 tablets (400 mg total) by mouth daily. 180 tablet 1 11/13/2024 at  7:00 AM    cyclobenzaprine 10 MG Oral Tab Take 0.5-1 tablets (5-10 mg total) by mouth 2 (two) times daily as needed for Muscle spasms. 30 tablet 0 Taking As Needed    traZODone 50 MG Oral Tab Take 1 tablet (50 mg total) by mouth. 0.5-1 tablet  by mouth nightly as needed   11/13/2024 at  9:00 PM    benztropine 1 MG Oral Tab Take 1 tablet (1 mg total) by mouth 2 (two) times daily.   11/13/2024 at  7:00 AM    busPIRone HCl 15 MG Oral Tab Take by mouth 2 (two) times a day.     11/13/2024 at  8:00 PM    Cholecalciferol (VITAMIN D3) 125 MCG (5000 UT) Oral Tab Take 1 tablet (5,000 Units total) by mouth daily.   11/13/2024 at  7:00 PM    loratadine 10 MG Oral Tab Take 1 tablet (10 mg total) by mouth daily.   11/13/2024 at  7:00 AM    Sertraline HCl 100 MG Oral Tab Take 2 tablets (200 mg total) by mouth daily. Take 2 tab once a day   11/13/2024 at  7:00 AM    Fremanezumab-vfrm (AJOVY) 225 MG/1.5ML Subcutaneous Solution Auto-injector Inject 225 mg into the skin every 30 (thirty) days. 1 each 2 11/3/2024    Syringe/Needle, Disp, (BD SAFETYGLIDE SYRINGE/NEEDLE) 27G X 5/8\" 1 ML Does not apply Misc To be used for Methotrexate injection once weekly 16 each 3     albuterol (PROAIR HFA) 108 (90 Base) MCG/ACT Inhalation Aero Soln Inhale 2 puffs into the lungs every 6 (six) hours as needed for Wheezing or Shortness of Breath (Cough). 1 each 0 More than a month    BD TB SYRINGE 25G X 5/8\" 1 ML Does not apply Misc        Fluticasone Propionate 50 MCG/ACT Nasal Suspension 2 sprays by Each Nare route daily. 1 Bottle 3 More than a month

## 2024-11-14 NOTE — ANESTHESIA PROCEDURE NOTES
Airway  Date/Time: 11/14/2024 7:14 AM  Urgency: elective      General Information and Staff    Patient location during procedure: OR  Anesthesiologist: Christopher Amanda MD  Performed: anesthesiologist   Performed by: Christopher Amanda MD  Authorized by: Christopher Amanda MD      Indications and Patient Condition  Indications for airway management: anesthesia  Sedation level: deep  Preoxygenated: yes  Patient position: sniffing  Mask difficulty assessment: 1 - vent by mask    Final Airway Details  Final airway type: endotracheal airway      Successful airway: ETT  Cuffed: yes   Successful intubation technique: direct laryngoscopy  Endotracheal tube insertion site: oral  Blade: Ilene  Blade size: #3  ETT size (mm): 7.0    Cormack-Lehane Classification: grade I - full view of glottis  Placement verified by: capnometry   Measured from: lips  ETT to lips (cm): 20  Number of attempts at approach: 1

## 2024-11-14 NOTE — DISCHARGE INSTRUCTIONS
Zbigniew Arnold MD  [facility]  Orthopaedic Surgery - Sports Medicine      Post Operative Instructions: Hip Arthroscopy with Labral Repair or Reconstruction    WEIGHT BEARING / MOVEMENT  You are FLAT FOOT weight bearing for the first 3 weeks after surgery and then 50% weight bearing for the 4th week; it is required that you use crutches for 4 weeks post-operatively to provide you with extra stability, to protect your hip, and to give the muscles around the hip the ability to rest. Do not hyperextend or hyperflex your hip, as this will be painful. Only move your hip within a pain-free range of motion.      The day after surgery, if you have access to a stationary bike, we encourage you to provide gentle motion to the hip. You may ride a stationary bike twice a day for 5-10 minutes (no resistance). This is only to provide motion to the hip and is not intended to stress the muscles around the hip girdle. Keep hip flexion to less than 90 degrees during these exercises.     Physical therapy will begin 7-10 days post-op after your first post-op visit.    ICE  You should use ice packs over the surgical site regularly throughout the day to help decrease swelling and pain. Make sure to have a layer between the ice and your knee so as to not injury your skin. Ice should be applied in intervals of 20-30 minutes on and off, and should be applied regularly for a minimum of 2 weeks following surgery. Do not apply ice to the area when you are asleep or at risk of falling asleep as     MEDICATIONS  An injection of local anesthesia was injected into your hip after the completion of the operation. This medication will wear in 6-12 hours. To control your pain during the transition while the anesthetic is wearing off, you should start the use of your pain medication, Norco, as you feel is needed.      You have also been given an anti-inflammatory called Naprosyn. You should begin taking this medication the night of surgery. You  are to take 500mg (1 tablet) two times a day. This medication serves two purposes: it will help reduce the amount of narcotic pain medication needed post-operatively, and it will prevent bone from re-growing (heterotopic ossification) around the hip. You are to take this medication for the first 21 days post-op.    A low dose of losartan has been prescribed to reduce the risk of post-operative fibrosis (scar tissue). This should be taken once daily for the first 2 weeks post-op.    Take Aspirin 325mg once daily starting the day after surgery for 3 weeks to help prevent the formation of blood clots.    If your are still having severe pain despite the Naprosyn, begin taking the pain medication (Norco) as prescribed.    An anti-nausea medication, Zofran, was prescribed for you and should be taken on an as needed basis, as the pain medication can cause nausea. To minimize this side effect, you should take your pain medication with some food.    DRESSING / BANDAGES:  Your hip dressing is NOT waterproof. Three days after surgery you may remove the hip dressing, and cover the areas with waterproof band-aids. It is then okay to shower with the waterproof band-aids. Do not scrub or wash with soap the area around the incisions for 3 weeks following surgery, just let water run over the areas. Similarly, during these 3 weeks, do not apply lotions or creams to the area around your incisions.     Do not take a bath or submerge your knee in water until your incisions are checked by me at your post-operative appointment and fully healed with all stitches removed. This is typically 2-3 weeks after surgery.    TEMPERATURE  It is normal to have an elevated temperature during the first 2-3 days post-operatively. Please call our office if your temperature is above 101F, if there is increased redness around the incision sites, or if there is increased drainage from the incision sites.    DRIVING  You may drive 2-3 weeks after surgery if  you are not taking narcotic pain medication. If your right leg is the operative side, then you must have good control of your leg prior to driving.    APPOINTMENT  It is likely that my surgical scheduler, Nelda, has already scheduled a post-operative visit for you. This should occur 10-14 days after surgery. At that visit you will be given a prescription for physical therapy.    You make experience some low back pain due to muscle spasm from the traction applied during positioning to be able to perform your hip surgery. If so, apply a heating pad to the area of discomfort.    If you have any difficulty using the anti-inflammatory medications or aspirin or have a history of peptic ulcer disease, please let me know.

## 2024-11-14 NOTE — OPERATIVE REPORT
Pre-Operative Diagnosis: TEAR OF RIGHT ACETABULAR LABRUM  RIGHT HIP FEMOROACETABULER IMPINGEMENT     Post-Operative Diagnosis: TEAR OF RIGHT ACETABULAR LABRUM  RIGHT HIP FEMOROACETABULER IMPINGEMENT     Procedure Performed:   RIGHT HIP ARTHROSCOPY, LABRAL REPAIR, CAPSULAR CLOSURE     Surgeon(s) and Role:     * Zbigniew Arnold MD - Primary     Assistant(s):  PA: Reinaldo Guadarrama PA-C    Skilled assistance was needed for patient positioning, prepping and draping, instrument holding and passing, retracting, and suturing.     Specimen: None     Estimated Blood Loss: Blood Output: 10 mL (11/14/2024  8:38 AM)     Indications for Procedure:  Karol Packer is a 44 year old female with 2 years of right hip pain that was found on radiographs to have well-preserved joint space significant with MR arthrogram demonstrated the presence of a tear of the right acetabular labrum.  Karol had previously exhausted conservative measures including activity modification as well as physical therapy without improvement in his hip symptoms. She previously underwent right hip injection that provided 1 week of near complete symptom relief.  We discussed risks, benefits, and alternatives to surgical intervention and the patient wished to proceed with right hip arthroscopic labral repair and capsular repair.  Informed consent was obtained.     Description of Procedure:  The patient and her parents was met in the preoperative holding area where correct patient, correct site, correct side, correct procedure were all confirmed.  The patient was placed under anesthesia and placed in a supine position on the [place] orthopaedic table.  Preoperative antibiotics were given.  A preoperative timeout was completed to WHO standards. All bony prominences were padded.  Traction was placed onto the nonoperative leg in a stabilizing position then gross traction was applied to the right lower extremity.  An air arthrogram was performed under sterile  conditions and confirmed under intraoperative fluoroscopy with atraumatic distention of the joint visualized.  Final traction was then applied with greater than 10 mm of joint distention visualized fluoroscopically.  The right hip was then prepped and draped in the usual sterile surgical fashion and shower curtain dressing applied to the operative side.  A standard anterior lateral portal was then used to establish access into the joint under fluoroscopic guidance so as to confirm that the labrum was not violated.  The [employer] cannulated access system was then used and a 5.0 mm cannula was inserted over Nitinol guidewire under fluoroscopic guidance.  The Nitinol wire was removed and found to be intact.  70 degree arthroscope was then inserted and the joint surface visualized.  A mid anterior portal was then established under fluoroscopic guidance with direct visualization as the needle entered the joint.  A 5.0 mm cannula was then inserted over Nitinol guidewire for establishment of the mid anterior portal.  The camera was placed into the mid anterior portal and anterior lateral portal placement was confirmed to be through the capsule and outside of the acetabular labrum.  Camera was then brought back to the anterior lateral portal and samurai blade was inserted into the mid anterior portal for interportal capsulotomy.  The camera was then exchanged and the samurai placed into the anterior lateral portal for pericapsulotomy.  A labral tear was then visualized in the 1:00 to 3 o'clock position without any associated injury to the chondral surface of the acetabular dome at the associated chondral labral junction.  Shaver was introduced and the labral tear was debrided down to a stable base.  Injector was then used to place retraction stitches in the acetabular leaflet of the incised capsule. The ablator was then introduced and the superior reflection of the capsular leaflet was reflected off of the superior acetabulum  adjacent to the labrum.        At this time attention was turned to the labral repair.  A distal anterior lateral accessory portal was then established under needle localization and fluoroscopic guidance.  A access cannula was then introduced over Nitinol wire via [employer] cannulated access system.  A 1.0 mm labral tape was then passed circumferentially around the labrum and without destabilizing the labral chondral junction.  This was placed into an anchor, and anchor socket was then drilled adjacent to the labral tape superiorly in the anterior acetabulum under direct visualization with no noted injury to the acetabular cartilage surface.  The anchor was then introduced into the joint and secured in place with appropriate eversion of the acetabular labrum.  This process was completed sequentially and a 3 anchor repair was performed. At this time the labrum was deemed to be appropriately repaired and peripheral borders were stable.  Final debridement of the chondral labral junction was completed with the use of shaver and electrocautery.  Traction was let down and appropriate suction seal was confirmed.  Total traction time was 47 minutes.      Attention was then turned to capsular closure.  The slingshot suture passer was then used to place simple stitches in the acetabular and femoral leaflets of the prior periportal capsulotomy and closed in sequential fashion for a water tight closure. The joint was then suctioned and incisions were injected with 0.25% Marcaine with epinephrine.  Incisions were then closed with 3-0 nylon in interrupted fashion and sterile dressings were placed.  The patient was awakened from anesthesia, transferred to postoperative cart and left the operating room in stable condition.  There were no complications.    Zbigniew Arnold MD  11/14/2024

## 2024-11-14 NOTE — BRIEF OP NOTE
Pre-Operative Diagnosis: A TEAR- RIGHT ACETABULAR LABRUM  FEMOROACETABULAR IMPINGEMENT RIGHT HIP     Post-Operative Diagnosis: A TEAR- RIGHT ACETABULAR LABRUM FEMOROACETABULAR IMPINGEMENT RIGHT HIP      Procedure Performed:   RIGHT HIP ARTHROSCOPY, LABRAL REPAIR, CAPSULAR CLOSURE (42995)    Surgeons and Role:     * Zbigniew Arnold MD - Primary    Assistant(s):  PA: Reinaldo Guadarrama PA-C     Surgical Findings: Tear of the acetabular labrum     Specimen: None     Estimated Blood Loss: Blood Output: 10 mL (11/14/2024  8:38 AM)    Zbigniew Arnold MD  11/14/2024  8:57 AM

## 2024-11-14 NOTE — H&P
ORTHOPEDIC SURGERY H+P     Patient Name:Karol Packer  Date of Appointment: 11/14/2024    Chief Complaint: Patient presents with:  Right Hip - Pain    HPI:   Karol Packer is a 44 year old female who presents for evaluation of right hip pain that has been present for 2 years. Patient states that the pain began without injury or inciting incident. Since onset, symptoms have worsened. She notes pain predominately in the anterior hip and c-sign distribution. She notes pain with weightbearing, going up and down stairs, and attempted return to sport. She denies significant component of associated back pain. She denies any associated radiating pain or nerve type symptoms down the ipsilateral or contralateral extremity distal to the knee. She denies feelings of clicking or popping around the hip. She has attempted treatment with oral OTC antiinflammatory medication, oral prescription antiinflammatory medication, tylenol, physical therapy, home exercise regimen, and corticosteroid injection. She has had MRI of the affected hip. Had approximately 95% symptom resolution following intra-articular hip corticosteroid injection for 1 week with symptom recurrence thereafter.    Past Medical History  Past Medical History:   Diagnosis Date   Asthma   Cervical polyp 4/2/2018   Depression   Lupus (HCC)   Migraines     Past Surgical History  Past Surgical History:   Procedure Laterality Date   COLONOSCOPY N/A 6/2/2021   Procedure: COLONOSCOPY; Surgeon: Jose Heard DO; Location:  ENDOSCOPY   COLPOSCOPY, CERVIX INC UPPER/ADJACENT VAGINA   COLPOSCOPY,LOOP ELECTRD CERVIX EXCIS   CONIZATION CERVIX,LOOP ELECTRD   SALPINGECTOMY Bilateral 12/09/2020   Laparoscopy   TONSILLECTOMY     Medications    Current Outpatient Medications:   albuterol 108 (90 Base) MCG/ACT Inhalation Aero Soln, Inhale 2 puffs into the lungs every 4 (four) hours as needed for Wheezing., Disp: 1 each, Rfl: 0  benzonatate 200 MG Oral Cap, Take 1 capsule  (200 mg total) by mouth 3 (three) times daily as needed., Disp: 30 capsule, Rfl: 0  QUEtiapine 50 MG Oral Tab, Take 50 mg by mouth nightly., Disp: , Rfl:   Methotrexate Sodium, PF, 50 MG/2ML Injection Solution, Once per week , Disp: , Rfl:   BD TB SYRINGE 25G X 5/8\" 1 ML Does not apply Misc, , Disp: , Rfl:   cyclobenzaprine 5 MG Oral Tab, Take 5 mg by mouth nightly. PRN , Disp: , Rfl:   Albuterol Sulfate HFA (PROAIR HFA) 108 (90 Base) MCG/ACT Inhalation Aero Soln, Inhale 2 puffs into the lungs every 6 (six) hours as needed for Wheezing or Shortness of Breath (Cough)., Disp: 1 each, Rfl: 0  Montelukast Sodium 10 MG Oral Tab, Take 1 tablet (10 mg total) by mouth nightly., Disp: 90 tablet, Rfl: 3  topiramate 50 MG Oral Tab, Take 1 tablet (50 mg total) by mouth 2 (two) times daily., Disp: 180 tablet, Rfl: 3  busPIRone HCl 15 MG Oral Tab, Take by mouth 2 (two) times a day. , Disp: , Rfl:   Biotin 99249 MCG Oral Tab, Take 1 tablet by mouth daily., Disp: , Rfl:   Cholecalciferol (VITAMIN D3) 125 MCG (5000 UT) Oral Tab, Take 1 tablet by mouth daily., Disp: , Rfl:   loratadine 10 MG Oral Tab, Take 10 mg by mouth daily., Disp: , Rfl:   folic acid 1 MG Oral Tab, Take 1 mg by mouth daily., Disp: , Rfl:   Sertraline HCl 100 MG Oral Tab, Take 200 mg by mouth daily. Take 2 tab once a day , Disp: , Rfl:   ALPRAZolam 0.25 MG Oral Tab, Take 0.25 mg by mouth daily as needed. , Disp: , Rfl:   Fluticasone Propionate 50 MCG/ACT Nasal Suspension, 2 sprays by Each Nare route daily., Disp: 1 Bottle, Rfl: 3  Hydroxychloroquine Sulfate (PLAQUENIL) 200 MG Oral Tab, Take 2 tablets by mouth Every morning., Disp: , Rfl:   gabapentin (NEURONTIN) 300 MG Oral Cap, Take 2 capsules by mouth nightly. , Disp: , Rfl:     Allergies    Ceclor HIVES  Comment:CAPS  Cellcept ANXIETY, CONFUSION, DIZZINESS,   FATIGUE, OTHER (SEE COMMENTS),   PAIN, SHORTNESS OF BREATH  Meloxicam OTHER (SEE COMMENTS)  Comment:Anxiety/Depression    Social History  Social  History  Tobacco Use  Smoking status: Former  Packs/day: 0.00  Years: 0.5 packs/day for 24.0 years (12.0 ttl pk-yrs)  Types: Cigarettes  Start date: 1995  Quit date: 2019  Years since quittin.7  Smokeless tobacco: Never  Vaping Use  Vaping status: Never Used  Alcohol use: Yes  Alcohol/week: 0.0 standard drinks of alcohol  Comment: occ  Drug use: No      Family History:  Family History   Problem Relation Age of Onset   Other (acute MI[other]) Maternal Grandfather   Heart Disorder Maternal Grandfather   Other (HTN[other]) Maternal Grandmother   Other (HTN[other]) Mother   Other (hypothyroidism[other]) Mother   Breast Cancer Mother   63 years ols   Stroke Father   59 years old     Review of Systems:   Constitutional: Denies fever, chills or weight loss   Allergies: As described in allergies  HEENT: Denies sore throat or acute eye problems  Respiratory: Denies shortness of breath   Cardiovascular: Denies chest pain or palpitations  GI: Denies abdominal pain, nausea  Skin: Denies rash   Neurologic: Denies headache or other problems  Musculoskeletal: As described in HPI  14 System Review of Systems otherwise negative     Physical Exam:     There were no vitals taken for this visit.     Constitutional: No acute distress. Well-nourished. Well-developed.  Head/Face: Normal appearance. Normocephalic. Atraumatic.  Respiratory: Normal Effort  Cardiovascular: warm, well-perfused distal extremities  Neurological: Oriented to time, place, and situation.  Psych: Normal mood, appropriate affect.    Musculoskeletal:    Hip Exam:  2024    RIGHT Hip LEFT Hip   Atrophy None None   Skin Clean, dry, and intact. No erythema or ecchymosis Clean, dry, and intact. No erythema or ecchymosis   Range of Motion 0 to 100, IR 10, ER 40 0 to 100, IR 10, ER 40   Tenderness  None None   Strength Normal Normal   Impingement Tests FATUMA: Negative  FADDIR:Positive FATUMA: Negative  FADDIR: Negative   Stinchfield Test Negative  Negative   Jesus's Test Negative Negative   Antoine Test Negative Negative   Sports hernia testing Not Tested Not Tested     Skin: Normal except where indicated above  Pulses: Symmetric palpable pulses distally unless indicated above  Lymph: No palpable lymph nodes on examined extremities    Diagnostic Studies:     2 views of the right hip including AP and lateral views demonstrate no acute fracture or dislocation. There is well-preserved femoroacetabular joint space without evidence of degenerative changes. There is no evidence of femoroacetabular impingement or hip dysplasia. Lateral center-edge angle of 27 degrees, Tonnis angle of 4 degrees, 4 mm of remaining femoroacetabular joint space.     MRI of the right hip dated 8/3/2024 demonstrates well-preserved femoral acetabular joint without significant evidence of degenerative cartilaginous wear or subchondral cyst formation. There is area concerning for anterior superior acetabular labral tear. No evidence of tendinopathy of the hip girdle. No significant trochanteric bursitis.    Assessment:     44 year old female presents with the following diagnoses:    1. Tear of right acetabular labrum, initial encounter    2. Femoroacetabular impingement of right hip    Plan:     Plan:   - To OR for right hip arthroscopy, labral repair, capsular closure    Zbigniew Arnold MD  [facility]  Orthopedic Surgery, Sports Medicine

## 2024-11-20 DIAGNOSIS — R60.0 BILATERAL LOWER EXTREMITY EDEMA: ICD-10-CM

## 2024-11-20 RX ORDER — HYDROCHLOROTHIAZIDE 12.5 MG/1
12.5 CAPSULE ORAL EVERY MORNING
Qty: 30 CAPSULE | Refills: 0 | Status: SHIPPED | OUTPATIENT
Start: 2024-11-20

## 2024-11-21 DIAGNOSIS — G43.009 MIGRAINE WITHOUT AURA AND WITHOUT STATUS MIGRAINOSUS, NOT INTRACTABLE: ICD-10-CM

## 2024-11-21 RX ORDER — SUMATRIPTAN 50 MG/1
TABLET, FILM COATED ORAL
Qty: 9 TABLET | Refills: 2 | Status: SHIPPED | OUTPATIENT
Start: 2024-11-21

## 2024-11-21 NOTE — TELEPHONE ENCOUNTER
Medication: SUMATRIPTAN 50 MG Oral Tab      Date of last refill: 10/10/2024 (#9/0)  [place]     Last office visit: 07/31/2024  Due back to clinic per last office note:  Around 11/30/2024  Date next office visit scheduled:    Future Appointments   Date Time Provider Department Center   2/5/2025  1:40 PM Adriane Blanton MD [facility] [address]           Last OV note recommendation:    Discussion/Plan:  Migraines without aura  Topamax not effective. Recommend trial of nortriptyline 20mg (will then stop or lower trazodone)  If SE with nortriptyline, trial Depakote. Patient had tubal ligation, no chance of problems  Decrease trazodone to 50mg x 2 nights, then 25mg x 2-3 nights, then stop. Can add back 25 or 50mg if needed  Start magnesium threonate or glycinate, not more than 400mg nightly  Switch to fiorcet from sumatriptan as abortive  To know triggers and lifestyle behaviors including limiting caffeine intake, not skipping meals, having regular sleep schedule and practicing good sleep hygiene, avoid migraine food triggers/try to identify if any of them are triggers (nitrates, aged cheeses, red wine, chocolate, msg, artificial sweetener). Also discussed medication overuse headache including taking abortive medications, or any combination of them, on more than 2-3 days of the week, and to take abortive medication immediately at onset of headache     Nonspecific mild white matter linear, very small, left periventricular T2 hyperintensity- likely nonspecific microischemic white matter abnormality, possibly related to migraines. As not clear from imaging, and recommended by radiology, repeat MRI brain for comparison, to make sure no signs of demyelinating disease, as clinically questioned. Work up for MS was otherwise negative. Repeat MRI brain.        Word finding difficulty- recheck B12 level, was 315 lower end in 9/2020. Improved since stopping topamax but still present  occasionally

## 2024-12-09 ENCOUNTER — PATIENT MESSAGE (OUTPATIENT)
Dept: RHEUMATOLOGY | Facility: CLINIC | Age: 44
End: 2024-12-09

## 2024-12-09 DIAGNOSIS — M06.09 RHEUMATOID ARTHRITIS OF MULTIPLE SITES WITHOUT RHEUMATOID FACTOR (HCC): ICD-10-CM

## 2024-12-09 RX ORDER — METHOTREXATE 25 MG/ML
20 INJECTION INTRA-ARTERIAL; INTRAMUSCULAR; INTRATHECAL; INTRAVENOUS WEEKLY
Qty: 4 EACH | Refills: 1 | Status: SHIPPED | OUTPATIENT
Start: 2024-12-09

## 2024-12-09 NOTE — TELEPHONE ENCOUNTER
LOV: 08/05/2024    Future Appointments   Date Time Provider Department Center   2/5/2025  1:40 PM Adriane Blanton MD [facility] [address]       LF: 11/06/2024     QTY: 8 mL     Refills: 0    LABS:   Component      Latest Ref Rng 10/15/2024   WBC      4.0 - 11.0 x10(3) uL 6.8    RBC      3.80 - 5.30 x10(6)uL 4.13    Hemoglobin      12.0 - 16.0 g/dL 13.7    Hematocrit      35.0 - 48.0 % 41.2    Platelet Count      150.0 - 450.0 10(3)uL 271.0    MCV      80.0 - 100.0 fL 99.8    MCH      26.0 - 34.0 pg 33.2    MCHC      31.0 - 37.0 g/dL 33.3    RDW      % 13.9    Prelim Neutrophil Abs      1.50 - 7.70 x10 (3) uL 3.40    Neutrophils Absolute      1.50 - 7.70 x10(3) uL 3.40    Lymphocytes Absolute      1.00 - 4.00 x10(3) uL 2.52    Monocytes Absolute      0.10 - 1.00 x10(3) uL 0.52    Eosinophils Absolute      0.00 - 0.70 x10(3) uL 0.29    Basophils Absolute      0.00 - 0.20 x10(3) uL 0.05    Immature Granulocyte Absolute      0.00 - 1.00 x10(3) uL 0.02    Neutrophils %      % 50.0    Lymphocytes %      % 37.1    Monocytes %      % 7.6    Eosinophils %      % 4.3    Basophils %      % 0.7    Immature Granulocyte %      % 0.3    Glucose      70 - 99 mg/dL 91    Sodium      136 - 145 mmol/L 140    Potassium      3.5 - 5.1 mmol/L 3.6    Chloride      98 - 112 mmol/L 104    Carbon Dioxide, Total      21.0 - 32.0 mmol/L 33.0 (H)    ANION GAP      0 - 18 mmol/L 3    BUN      9 - 23 mg/dL 13    CREATININE      0.55 - 1.02 mg/dL 0.69    CALCIUM      8.7 - 10.4 mg/dL 9.7    CALCULATED OSMOLALITY      275 - 295 mOsm/kg 290    EGFR      >=60 mL/min/1.73m2 110    AST (SGOT)      <34 U/L 27    ALT (SGPT)      10 - 49 U/L 20    ALKALINE PHOSPHATASE      37 - 98 U/L 72    Total Bilirubin      0.3 - 1.2 mg/dL 0.5    PROTEIN, TOTAL      5.7 - 8.2 g/dL 6.9    Albumin      3.2 - 4.8 g/dL 4.9 (H)    Globulin      2.0 - 3.5 g/dL 2.0    A/G Ratio      1.0 - 2.0  2.5 (H)    Patient Fasting for CMP? No    SED RATE      0 - 20 mm/Hr 4        Legend:  (H) High

## 2024-12-20 ENCOUNTER — PATIENT MESSAGE (OUTPATIENT)
Dept: RHEUMATOLOGY | Facility: CLINIC | Age: 44
End: 2024-12-20

## 2024-12-20 DIAGNOSIS — M35.01 SJOGREN'S SYNDROME WITH KERATOCONJUNCTIVITIS SICCA (HCC): ICD-10-CM

## 2024-12-20 DIAGNOSIS — M06.09 RHEUMATOID ARTHRITIS OF MULTIPLE SITES WITHOUT RHEUMATOID FACTOR (HCC): Primary | ICD-10-CM

## 2024-12-31 DIAGNOSIS — G43.009 MIGRAINE WITHOUT AURA AND WITHOUT STATUS MIGRAINOSUS, NOT INTRACTABLE: ICD-10-CM

## 2024-12-31 DIAGNOSIS — R60.0 BILATERAL LOWER EXTREMITY EDEMA: ICD-10-CM

## 2024-12-31 DIAGNOSIS — M06.09 RHEUMATOID ARTHRITIS OF MULTIPLE SITES WITHOUT RHEUMATOID FACTOR (HCC): Primary | ICD-10-CM

## 2025-01-02 ENCOUNTER — PATIENT MESSAGE (OUTPATIENT)
Dept: NEUROLOGY | Facility: CLINIC | Age: 45
End: 2025-01-02

## 2025-01-02 DIAGNOSIS — G43.009 MIGRAINE WITHOUT AURA AND WITHOUT STATUS MIGRAINOSUS, NOT INTRACTABLE: Primary | ICD-10-CM

## 2025-01-02 RX ORDER — HYDROCHLOROTHIAZIDE 12.5 MG/1
12.5 CAPSULE ORAL EVERY MORNING
Qty: 30 CAPSULE | Refills: 0 | Status: SHIPPED | OUTPATIENT
Start: 2025-01-02

## 2025-01-02 RX ORDER — FREMANEZUMAB-VFRM 225 MG/1.5ML
225 INJECTION SUBCUTANEOUS
Qty: 3 EACH | Refills: 0 | Status: SHIPPED | OUTPATIENT
Start: 2025-01-02

## 2025-01-02 RX ORDER — HYDROXYCHLOROQUINE SULFATE 200 MG/1
400 TABLET, FILM COATED ORAL DAILY
Qty: 60 TABLET | Refills: 1 | Status: SHIPPED | OUTPATIENT
Start: 2025-01-02

## 2025-01-02 NOTE — TELEPHONE ENCOUNTER
Refill Plaquenil 200 mg twice a day #60 x 1 refill sent to [facility] in [facility] in [place].  Dr. Oneil covering Dr. Blanton..

## 2025-01-02 NOTE — TELEPHONE ENCOUNTER
Per epic Aimovig and emgality are preferred but will need a Prior Authorization.     Per patient neither are covered. Will attempt to get one covered. If not will need oral medication.

## 2025-01-02 NOTE — TELEPHONE ENCOUNTER
Medication: Fremanezumab-vfrm (AJOVY) 225 MG/1.5ML Subcutaneous Solution Auto-injector      Date of last refill: 10/10/2024 (#1 each/2)  [place]     Last office visit: 07/21/2024  Due back to clinic per last office note:  Around 11/30/2024  Date next office visit scheduled:    Future Appointments   Date Time Provider Department Center   1/4/2025  7:00 AM REF [facility] REF [facility] Ref [facility] 17   2/5/2025  1:40 PM Adriane Blanton MD [facility] [address]   3/12/2025  8:55 AM Katherine Cameron DO [facility]           Last OV note recommendation:    Discussion/Plan:  Migraines without aura  Topamax not effective. Recommend trial of nortriptyline 20mg (will then stop or lower trazodone)  If SE with nortriptyline, trial Depakote. Patient had tubal ligation, no chance of problems  Decrease trazodone to 50mg x 2 nights, then 25mg x 2-3 nights, then stop. Can add back 25 or 50mg if needed  Start magnesium threonate or glycinate, not more than 400mg nightly  Switch to fiorcet from sumatriptan as abortive  To know triggers and lifestyle behaviors including limiting caffeine intake, not skipping meals, having regular sleep schedule and practicing good sleep hygiene, avoid migraine food triggers/try to identify if any of them are triggers (nitrates, aged cheeses, red wine, chocolate, msg, artificial sweetener). Also discussed medication overuse headache including taking abortive medications, or any combination of them, on more than 2-3 days of the week, and to take abortive medication immediately at onset of headache     Nonspecific mild white matter linear, very small, left periventricular T2 hyperintensity- likely nonspecific microischemic white matter abnormality, possibly related to migraines. As not clear from imaging, and recommended by radiology, repeat MRI brain for comparison, to make sure no signs of demyelinating disease, as clinically questioned. Work up for  MS was otherwise negative. Repeat MRI brain.        Word finding difficulty- recheck B12 level, was 315 lower end in 9/2020. Improved since stopping topamax but still present occasionally

## 2025-01-02 NOTE — TELEPHONE ENCOUNTER
Patient tried topamax and nortriptyline- not sure if stayed at 10mg or came off.    Recommend to try PA for emgality, and if not approved, can try other option.

## 2025-01-02 NOTE — TELEPHONE ENCOUNTER
Last office visit: 8/5/24    Next Rheum Apt:2/5/2025 Adriane Blanton MD    Last fill: 8/5/24    Eye exam: 1/17/24    Labs:   Lab Results   Component Value Date    CREATSERUM 0.69 10/15/2024    ALKPHO 72 10/15/2024    AST 27 10/15/2024    ALT 20 10/15/2024    BILT 0.5 10/15/2024    TP 6.9 10/15/2024    ALB 4.9 (H) 10/15/2024       Lab Results   Component Value Date    WBC 6.8 10/15/2024    HGB 13.7 10/15/2024    .0 10/15/2024    NEPRELIM 3.40 10/15/2024    NEPERCENT 50.0 10/15/2024    LYPERCENT 37.1 10/15/2024    NE 3.40 10/15/2024    LYMABS 2.52 10/15/2024

## 2025-01-03 RX ORDER — ERENUMAB-AOOE 70 MG/ML
70 INJECTION SUBCUTANEOUS
Qty: 1 ML | Refills: 0 | Status: SHIPPED | OUTPATIENT
Start: 2025-01-03 | End: 2025-02-02

## 2025-01-03 NOTE — TELEPHONE ENCOUNTER
Started PA for Emgality, insurance requesting medical records showing a 12 week trial and failure  of both Aimovig and Ajovy prior to approving Emgality.

## 2025-01-03 NOTE — TELEPHONE ENCOUNTER
PA initiated for Aimovig. Key: [id number]. Awaiting determination.    Approved today by [employer] [facility]  Request Reference Number: [id number]. AIMOVIG INJ 70MG/ML is approved through 07/03/2025. Your patient may now fill this prescription and it will be covered.  Authorization Expiration Date: 7/3/2025

## 2025-01-06 RX ORDER — DIVALPROEX SODIUM 250 MG/1
TABLET, FILM COATED, EXTENDED RELEASE ORAL
Qty: 60 TABLET | Refills: 1 | Status: SHIPPED | OUTPATIENT
Start: 2025-01-06

## 2025-01-06 NOTE — TELEPHONE ENCOUNTER
Recommend Depakote, and blood work once has been on final dose for at least 1 week. If patient fails this medication, would recommend to retry Aimovig, unless this is off the table?

## 2025-01-25 ENCOUNTER — LAB ENCOUNTER (OUTPATIENT)
Dept: LAB | Age: 45
End: 2025-01-25
Attending: Other
Payer: COMMERCIAL

## 2025-01-25 DIAGNOSIS — M06.09 RHEUMATOID ARTHRITIS OF MULTIPLE SITES WITHOUT RHEUMATOID FACTOR (HCC): ICD-10-CM

## 2025-01-25 DIAGNOSIS — M35.01 SJOGREN'S SYNDROME WITH KERATOCONJUNCTIVITIS SICCA (HCC): ICD-10-CM

## 2025-01-25 DIAGNOSIS — G43.009 MIGRAINE WITHOUT AURA AND WITHOUT STATUS MIGRAINOSUS, NOT INTRACTABLE: ICD-10-CM

## 2025-01-25 LAB
ALBUMIN SERPL-MCNC: 4.6 G/DL (ref 3.2–4.8)
ALBUMIN/GLOB SERPL: 1.8 {RATIO} (ref 1–2)
ALP LIVER SERPL-CCNC: 66 U/L
ALT SERPL-CCNC: 10 U/L
ANION GAP SERPL CALC-SCNC: 7 MMOL/L (ref 0–18)
AST SERPL-CCNC: 21 U/L (ref ?–34)
BASOPHILS # BLD AUTO: 0.02 X10(3) UL (ref 0–0.2)
BASOPHILS NFR BLD AUTO: 0.5 %
BILIRUB SERPL-MCNC: 0.4 MG/DL (ref 0.3–1.2)
BUN BLD-MCNC: 9 MG/DL (ref 9–23)
CALCIUM BLD-MCNC: 9.4 MG/DL (ref 8.7–10.6)
CHLORIDE SERPL-SCNC: 99 MMOL/L (ref 98–112)
CO2 SERPL-SCNC: 31 MMOL/L (ref 21–32)
CREAT BLD-MCNC: 0.67 MG/DL
EGFRCR SERPLBLD CKD-EPI 2021: 110 ML/MIN/1.73M2 (ref 60–?)
EOSINOPHIL # BLD AUTO: 0.18 X10(3) UL (ref 0–0.7)
EOSINOPHIL NFR BLD AUTO: 4.1 %
ERYTHROCYTE [DISTWIDTH] IN BLOOD BY AUTOMATED COUNT: 13.4 %
FASTING STATUS PATIENT QL REPORTED: NO
GLOBULIN PLAS-MCNC: 2.6 G/DL (ref 2–3.5)
GLUCOSE BLD-MCNC: 107 MG/DL (ref 70–99)
HCT VFR BLD AUTO: 43.8 %
HGB BLD-MCNC: 14.6 G/DL
IMM GRANULOCYTES # BLD AUTO: 0.01 X10(3) UL (ref 0–1)
IMM GRANULOCYTES NFR BLD: 0.2 %
LYMPHOCYTES # BLD AUTO: 1.97 X10(3) UL (ref 1–4)
LYMPHOCYTES NFR BLD AUTO: 44.6 %
MCH RBC QN AUTO: 32.7 PG (ref 26–34)
MCHC RBC AUTO-ENTMCNC: 33.3 G/DL (ref 31–37)
MCV RBC AUTO: 98 FL
MONOCYTES # BLD AUTO: 0.56 X10(3) UL (ref 0.1–1)
MONOCYTES NFR BLD AUTO: 12.7 %
NEUTROPHILS # BLD AUTO: 1.68 X10 (3) UL (ref 1.5–7.7)
NEUTROPHILS # BLD AUTO: 1.68 X10(3) UL (ref 1.5–7.7)
NEUTROPHILS NFR BLD AUTO: 37.9 %
OSMOLALITY SERPL CALC.SUM OF ELEC: 283 MOSM/KG (ref 275–295)
PLATELET # BLD AUTO: 257 10(3)UL (ref 150–450)
POTASSIUM SERPL-SCNC: 3.7 MMOL/L (ref 3.5–5.1)
PROT SERPL-MCNC: 7.2 G/DL (ref 5.7–8.2)
RBC # BLD AUTO: 4.47 X10(6)UL
SODIUM SERPL-SCNC: 137 MMOL/L (ref 136–145)
WBC # BLD AUTO: 4.4 X10(3) UL (ref 4–11)

## 2025-01-25 PROCEDURE — 86225 DNA ANTIBODY NATIVE: CPT

## 2025-01-25 PROCEDURE — 80053 COMPREHEN METABOLIC PANEL: CPT

## 2025-01-25 PROCEDURE — 85025 COMPLETE CBC W/AUTO DIFF WBC: CPT

## 2025-01-25 PROCEDURE — 85652 RBC SED RATE AUTOMATED: CPT

## 2025-01-25 PROCEDURE — 36415 COLL VENOUS BLD VENIPUNCTURE: CPT

## 2025-01-27 DIAGNOSIS — M06.09 RHEUMATOID ARTHRITIS OF MULTIPLE SITES WITHOUT RHEUMATOID FACTOR (HCC): Primary | ICD-10-CM

## 2025-01-27 DIAGNOSIS — M35.01 KERATOCONJUNCTIVITIS SICCA, IN SJOGREN'S SYNDROME (HCC): ICD-10-CM

## 2025-01-28 LAB — DSDNA IGG SERPL IA-ACNC: 5.3 IU/ML

## 2025-01-30 ENCOUNTER — APPOINTMENT (OUTPATIENT)
Dept: LAB | Age: 45
End: 2025-01-30
Attending: INTERNAL MEDICINE
Payer: COMMERCIAL

## 2025-01-30 LAB — ERYTHROCYTE [SEDIMENTATION RATE] IN BLOOD: 4 MM/HR

## 2025-01-30 PROCEDURE — 36415 COLL VENOUS BLD VENIPUNCTURE: CPT

## 2025-01-30 PROCEDURE — 85652 RBC SED RATE AUTOMATED: CPT

## 2025-02-04 DIAGNOSIS — R60.0 BILATERAL LOWER EXTREMITY EDEMA: ICD-10-CM

## 2025-02-05 ENCOUNTER — OFFICE VISIT (OUTPATIENT)
Dept: RHEUMATOLOGY | Facility: CLINIC | Age: 45
End: 2025-02-05
Payer: COMMERCIAL

## 2025-02-05 VITALS
HEIGHT: 67 IN | DIASTOLIC BLOOD PRESSURE: 66 MMHG | SYSTOLIC BLOOD PRESSURE: 110 MMHG | TEMPERATURE: 98 F | BODY MASS INDEX: 21.66 KG/M2 | RESPIRATION RATE: 16 BRPM | OXYGEN SATURATION: 99 % | HEART RATE: 96 BPM | WEIGHT: 138 LBS

## 2025-02-05 DIAGNOSIS — F90.8 OTHER SPECIFIED ATTENTION DEFICIT HYPERACTIVITY DISORDER (ADHD): ICD-10-CM

## 2025-02-05 DIAGNOSIS — M79.7 FIBROMYALGIA: ICD-10-CM

## 2025-02-05 DIAGNOSIS — D84.9 IMMUNOSUPPRESSED STATUS (HCC): Primary | ICD-10-CM

## 2025-02-05 DIAGNOSIS — F41.1 GAD (GENERALIZED ANXIETY DISORDER): ICD-10-CM

## 2025-02-05 DIAGNOSIS — Z51.81 THERAPEUTIC DRUG MONITORING: ICD-10-CM

## 2025-02-05 DIAGNOSIS — M32.9 SYSTEMIC LUPUS ERYTHEMATOSUS, UNSPECIFIED SLE TYPE, UNSPECIFIED ORGAN INVOLVEMENT STATUS (HCC): ICD-10-CM

## 2025-02-05 PROCEDURE — 99214 OFFICE O/P EST MOD 30 MIN: CPT | Performed by: INTERNAL MEDICINE

## 2025-02-05 RX ORDER — DICLOFENAC SODIUM 75 MG/1
75 TABLET, DELAYED RELEASE ORAL 2 TIMES DAILY
COMMUNITY
Start: 2025-01-30

## 2025-02-05 RX ORDER — HYDROCHLOROTHIAZIDE 12.5 MG/1
12.5 CAPSULE ORAL EVERY MORNING
Qty: 30 CAPSULE | Refills: 0 | OUTPATIENT
Start: 2025-02-05

## 2025-02-05 RX ORDER — HYDROCHLOROTHIAZIDE 12.5 MG/1
12.5 CAPSULE ORAL EVERY MORNING
Qty: 30 CAPSULE | Refills: 0 | Status: SHIPPED | OUTPATIENT
Start: 2025-02-05

## 2025-02-05 NOTE — PROGRESS NOTES
[facility], [address] [place] [address]     Progress Note     Karol Packer Patient Status:  No patient class for patient encounter    1980 MRN [id number]   Location [facility], [address] [place] [address] Attending No att. providers found   Hosp Day # 0 PCP Leni Schumacher DO     Chief Complaint: History of lupus fibromyalgia    Subjective:   S:     44-year-old woman comes in for reevaluation for lupus and fibromyalgia    She was last seen in clinic 2024  She has been stable on methotrexate 20 mg subcu week and hydroxychloroquine 400 mg daily  Eye exam was normal 2025  No major flareups  Had right hip arthroscopic surgery 12 weeks postop with continued spasms and tightness in the right hip but overall improving in the right direction she is seeing orthopedic surgeon and they had suggested taking anti-inflammatories more regularly  Also noted some mild edema in the lower extremities we had suggested considering this being related to recent surgery or gabapentin.  Patient is not interested in weaning down gabapentin because the benefits of pain management from her fibromyalgia and neuropathy  States no swelling of her joints itself  Recent labs 2025 normal  Recently diagnosed with ADHD along with depression and anxiety and is going was started on Concerta about 6 months ago  She is now seeing neurologist at [facility] has upcoming MRI to follow-up with brain lesions from a year ago  Given new medications for migraines has not picked it up yet from the pharmacy  Significant improvement in her nonrestorative sleep fatigue  Continues to have stressors at work triggering her generalized pain and neuropathy no swelling of her joints  She is on gabapentin 600 mg at bedtime.  Along with another 200 mg at bedtime which was uptitrated last visit  She states no shortness of breath chest pain fevers or chills  Denies any flareups of swelling of her joints  Has  some weight loss but she states this is more related to her depression and she is working on it with a psychiatrist  She has lost 2 pounds in the last 6 months likely because of her Concerta for ADHD  States no rashes oral nasal ulcers  No night sweats.  She has no concerns at this time.    Previously in November 2022  She had continued persistent hearing loss but stable  She had seen ENT who stated some stability but some worsening  They had recommended seeing a neurologist again which she has made an appointment at [facility] with a new neurologist in September 2022  She was told nothing concerning based on MRI and maybe yearly MRI of the brain  Autoimmune testing repeated in April 2022 was normal including double-stranded DNA and complements  She continued methotrexate along with hydroxychloroquine  States no rashes no shortness of breath that is worse.    Her depression and anxiety are fairly stable.   States no weakness no swelling of her joints  Or new worsening pain.  States no rashes oral or nasal ulcers  Her weight has stabilized    Has longstanding history of hip impingement issues that come and go and does exercises on her own.     She has a grandson who is 2 months old which she is very active bleeding involved with an quite excited about.    Previously hearing loss had started November 2021  She had extensive workup and seen ENT and neurology subsequently  She had MRI that was abnormal and unclear for lesions but then further workup revealed no indication of multiple sclerosis lumbar puncture also was unrevealing  Since her double-strand DNA and complements were low. Suspected considering possibility of lupus related CNS disease  She was placed on steroids along with CellCept for at least 6-8 weeks  She cannot tolerate the CellCept after 4-6 weeks and stopped because of significant side effects  She has remained off steroids  She has some residual pulmonary issues but improving overall  She is  gotten a chest x-ray which was normal  Her PCP thinks it is related to her allergies and she is on inhalers with improvement  We were previously considering Benlysta as an additional medication if her lupus testing is concerning    I discussed possibility of CNS related lupus with testing being abnormal which could possibly explain some of her manifestations but not clearly    She verbalizes understanding and agrees with this plan    She was continued on methotrexate was 20 mg subcutaneous every week    Previous MRI which showed below findings    IMPRESSION:    1. Normal internal auditory canals bilaterally.  2. 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  3. A few small areas of T2 signal prolongation on FLAIR imaging perpendicularly oriented to the left  lateral ventricle in the region of the DVA, likely representing adjacent minimal gliosis..    FINDINGS: The internal auditory canals are symmetric. No abnormal enhancement following contrast  administration. The perimesencephalic and cerebellopontine angle cisterns are normal. FLAIR images demonstrate minimal periventricular white matter T2 enhancement perpendicular oriented to the left lateral ventricle. Otherwise, no abnormal signal. There is no restricted diffusion. Whole brain contrast enhanced images demonstrate no abnormal enhancement except for the small developmental venous anomaly in the left corona radiata. This is also near the area of the T2 signal prolongation near the lateral ventricle on the left, and likely accounts for that finding. Demyelination from multiple sclerosis is felt to be less likely, although can be seen with this type of white matterchange, since the patient does not carry a diagnosis of multiple sclerosis, and no similar findings  are seen throughout the remainder of the brain. This needs clinical correlation and follow-up.    She  states her neurologist thinks that the cognitive issues could be related to Topamax but she would like to remain on this since this controlled her migraines overall  Prior to that She had malar rash, and polyarthritis and positive double strand DNA and low complements suggestive of active disease    This has resolved or joint swelling and stiffness and also some of her rash on her face    Her double strand DNA had improved     She states her side effects have improved but she has reflux from stress at work and thinks she has an ulcer. She has been doing over-the-counter reflux medications. She has not made appointment with GI yet. Her weight has stabilized    Previously CT of the chest showed some mild lung collapse but no infiltrates.  CT abdomen and pelvis showed some gastric wall thickening but no acute findings    She does not feel like the weight loss is from her lupus and more her stress.        Her last imaging was May 2021 and showed multiple pulmonary nodules. She has been started on oral inhalers with improvement.    Chest x-ray was normal prior to starting methotrexate.    She states she is not planning on getting pregnant and understands the risk of doing so and she does not drink alcohol.    Risks and side effects discussed in detail including possibility of cytopenias kidney liver involvement.    She has remained on gabapentin 600 mg daily, hydroxychloroquine 400 mg daily.     Her eye exam was done By another ophthalmologist October 2021 and negative for toxicity    She states no vision changes or side effects otherwise    She  has multiple external allergies and is followed closely by her primary care physician with occasional wheezing and recently added Singulair albuterol and she does take over-the-counter Flonase as well.    She periodically has some stressors with her 19-year-old daughter quitting school and moving to [place] with her boyfriend.     Her daughter was diagnosed with high functioning  autism and Tourette's syndrome. Overall she is trying to cope with this.     She does have some mild nonrestorative sleep and fatigue. Denies any suicidal ideation or active depression. She has general achiness overall.     Denies any photosensitive malar rash no swelling of her joints no oral or nasal ulcers. Her weight has been stable.     No night sweats fevers or chills Her pain levels are minimal and not concerning for her.       Review of Systems:     Constitutional: Negative for activity change, chills, diaphoresis, fatigue, fever and unexpected weight change.    HENT: Negative for congestion, hearing loss and mouth sores.    Eyes:  Negative for pain, redness and visual disturbance.    Respiratory: Negative for apnea, cough, chest tightness, occasional shortness of breath, occasional wheezing Cardiovascular: Negative for chest pain, palpitations and leg swelling.    Gastrointestinal: Negative for abdominal distention, abdominal pain, blood in stool, constipation, diarrhea and nausea.    Endo: Negative.    Genitourinary:  Negative for decreased urine volume, difficulty urinating, dysuria, and frequency.     Musculoskeletal: Occasional arthralgias, no gait problem and joint swelling.    Skin: Negative. Negative for color change, pallor and rash. No raynauds or digital ulcerations.    Allergic/Immunologic: Negative.    Neurological: Negative. Negative for dizziness, tremors, seizures, syncope,  speech difficulty, weakness, light-headedness, numbness and headaches.    Hematological:  Does not bruise/bleed easily.    Psychiatric/Behavioral: Negative depression, confusion, decreased concentration, self-injury, sleep disturbance and suicidal ideas. The patient is not nervous/anxious.    Objective:   Vital signs:  [unfilled]    Wt Readings from Last 3 Encounters:   02/05/25 138 lb (62.6 kg)   11/14/24 133 lb 3.2 oz (60.4 kg)   10/10/24 130 lb (59 kg)       Intake/Output:  [unfilled]    [unfilled]  Physical  Exam:      Constitutional: Is oriented to person, place, and time. No distress.    HEENT: Normocephalic, no jvd; no lad; EOMI; good oral pooling; no oral or nasal ulcers.    Eyes: Conjunctivae and EOM are normal. Pupils are equal, round, and reactive to light.    Neck: Normal range of motion. No thyromegaly present.    Cardiovascular: RRR, no murmurs.    Lungs: Clear, Bilateral air entry, + wheezes.    Abdominal: Soft. Nontender; nondistended; BS+.    Musculoskeletal:       Right shoulder:  Exhibits normal range of motion on abduction and internal rotation, no tenderness, no bony tenderness, no deformity, no laceration, no pain and no spasm.       Left shoulder: Exhibits normal range of motion on abduction and internal and external rotation.  no tenderness, no bony tenderness, no swelling, no effusion, no deformity, no pain, no spasm and normal strength.       Right elbow: Exhibits normal range of motion, no swelling, no effusion and no deformity. No tenderness found. No medial epicondyle, no lateral epicondyle and no olecranon process tenderness noted. There are no contractures or tophi or nodules.       Left elbow: Exhibits normal range of motion, no swelling, no effusion and no deformity. No tenderness found. No medial epicondyle, no lateral epicondyle and no olecranon process tenderness noted.  There are no contractures or tophi or nodules       Right wrist: Exhibits normal range of motion, no tenderness, no bony tenderness, no swelling, no effusion and no crepitus. Flexion and extension intact without limitation.       Left wrist:  Exhibits normal range of motion, no tenderness, no bony tenderness, no swelling, no effusion, no crepitus and no deformity. Flexion and extension intact without limitation.       Right hip: Exhibits normal range of motion, normal strength, no tenderness, no bony tenderness, no swelling and no crepitus.       Right hand: No synovitis of MCP,PIP or DIP joints; no Bouchards or Heberden  nodules noted;  strength: 100%.       Left hand: No synovitis of MCP,PIP or DIP joints; no Bouchards or Heberden nodules noted;  strength: 100%.       Left hip: Exhibits normal range of motion, normal strength, no tenderness, no bony tenderness, no swelling and no crepitus.       Right Hip: Normal without LROM or TTP       Right knee: Exhibits normal range of motion, no swelling, no effusion, no ecchymosis, no deformity and no erythema. No tenderness found. No medial joint line, no lateral joint line, no MCL and no LCL tenderness noted. No crepitation found on flexion and extension normal.       Left knee: Exhibits normal range of motion, no swelling, no effusion, no ecchymosis and no erythema. No tenderness found. No medial joint line, no lateral joint line and no patellar tendon tenderness noted. No crepitation found on flexion and flexion intact.       Right ankle: Exhibits normal range of motion, no swelling, no deformity and no laceration. No tenderness. No lateral malleolus and no medial malleolus tenderness found. Achilles tendon normal. Achilles tendon exhibits no pain, no defect and normal White's test results.       Left ankle: Exhibits normal range of motion, no swelling, and no deformity. No tenderness. No lateral malleolus and no medial malleolus tenderness found.       Cervical back: Exhibits normal range of motion, no tenderness, no bony tenderness, no swelling, no pain and no spasm.       Thoracic back: Exhibits normal range of motion, no tenderness, no bony tenderness and no spasm.       Lumbar back: Exhibits normal range of motion, no tenderness, no bony tenderness, no pain and no spasm.       Right foot: No tenderness, no bony tenderness, no crepitus and no laceration. There is no synovitis or tenderness of the MTP joints to palpation.  Mild soft tissue swelling MTP joints       Left foot: No tenderness, no bony tenderness and no crepitus. There is no synovitis or tenderness of the MTP  joints to palpation.  Mild soft tissue swelling MTP joints    Lymphadenopathy: No noted lympadenopathy.    Neurological: Is alert and oriented. No focal motor or sensory abnormalities.    Skin: Skin is warm, dry and intact.    Psychiatric: Normal behavior    Tender points upper extremities lower extremities everywhere she is touch 12 out of 12 tender points    Results:   Diagnostic Data:      Labs:    [unfilled]    Imaging:   XR FLUOROSCOPY C-ARM TIME LESS THAN 1 HOUR (CPT=76000)    Result Date: 11/14/2024  CONCLUSION:  See above.   LOCATION:  [facility]    Dictated by (CST): Merrill Madera MD on 11/14/2024 at 9:15 AM     Finalized by (CST): Merrill Madera MD on 11/14/2024 at 9:16 AM         Reviewed CBC CMP complement C3-C4 double-stranded DNA normal May 2023    Medications:     Assessment & Plan:   ASSESSMENT / PLAN:     43-year-old woman comes in for reevaluation for:    Systemic lupus without renal manifestations  Mild generalized osteoarthritis  Fibromyalgia  Depression  History of vitamin D deficiency  Reactive airway disease likely related to asthma and allergies  And residual coronavirus related lung disease (see chest x-ray normal)  Abnormal T2 restricted changes concerns for demyelination on MRI of the brain with new onset right hearing loss for the last 4 weeks followed by ENT  Unclear etiology could consider CNS lupus related changes  History of right hip arthroplasty 12 weeks status post right hip arthroscopy with labral repair, capsular closure on 11/14/2024.     Patient has no obvious synovitis on exam    Multiple tender points on exam with the increase in stress levels    Continue gabapentin 900 mg at bedtime; uptitrated February 2024 with improvement    Recent CBC CMP double-stranded DNA ESR normal May 2024    CT abdomen and pelvis chest reviewed with patient. Follow up with gastroenterology for EGD    Advised to consider Prilosec 40 mg daily    Previously had given her information to gastroenterology to  likely proceed with EGD as the next step    Continue methotrexate to 20 mg subcutaneous every week and continue folic acid    Continue hydroxychloroquine 400 mg daily.    Eye exam normal in January 2025    Labs normal January 2025    Lumbar puncture unrevealing for multiple sclerosis    Per patient neurologist does not think she has multiple sclerosis    She is getting a second opinion from neurologist at [facility] which I'm agreeable with. She likely needs another imaging of her brain.  She states her new neurologist has ordered an MRI which she will get done shortly    She cannot tolerate CellCept which was stopped in 2023    Consider Benlsyta in the future if her double-strand DNA is abnormal to treat possibility of CNS related manifestations of lupus    Her inflammatory arthritis is finally under control.    Her weight has also stabilized.    Agree with Singulair and albuterol when necessary; This is likely related to her allergies and viral syndrome. There have been cases of ankle associated vasculitis with Singulair    Chest x-ray normal Jan 2022    She should get pulmonary function test through her primary care physician    Depression is stable on Zoloft 100 mg daily and trazodone 50 mg at bedtime now followed by psychiatry.  Now on Concerta in the last 6 months with overall improvement of her ADHD    She is on 2 additional medications which have improved her symptoms    Continue to encourage low intensity exercise and stress management    discussed importance of stress management.    And on vitamin D levels today    Follow-up with PCP for migraine headaches. If this is related to lupus it will improve with prednisone and methotrexate. Otherwise symptomatic control through PCP    Right hip spasm from arthroscopic surgery consider EMG nerve conduction studies to rule out any sort of impingement because of the sharp pains she will follow-up with her orthopedic.  She is trying NSAIDs right  now    Education and counseling provided to patient on medications and diagnosis. Instructed patient to call my office or seek medical attention immediately if symptoms worsen.    Return to clinic:  Return in about 6 months (around 8/5/2025).    Adriane Blanton MD

## 2025-02-11 DIAGNOSIS — M06.09 RHEUMATOID ARTHRITIS OF MULTIPLE SITES WITH NEGATIVE RHEUMATOID FACTOR (HCC): ICD-10-CM

## 2025-02-11 DIAGNOSIS — M06.09 RHEUMATOID ARTHRITIS OF MULTIPLE SITES WITHOUT RHEUMATOID FACTOR (HCC): Primary | ICD-10-CM

## 2025-02-11 NOTE — TELEPHONE ENCOUNTER
LOV: 02/05/2025    Future Appointments   Date Time Provider Department Center   3/12/2025  8:55 AM Katherine Cameron DO [facility]   8/7/2025  1:40 PM Adriane Blanton MD [id number] [address]       LF: 12/02/2024    QTY: 180    Refills: 0

## 2025-02-12 DIAGNOSIS — R60.0 BILATERAL LOWER EXTREMITY EDEMA: ICD-10-CM

## 2025-02-12 RX ORDER — HYDROCHLOROTHIAZIDE 12.5 MG/1
12.5 CAPSULE ORAL EVERY MORNING
Qty: 30 CAPSULE | Refills: 0 | Status: SHIPPED | OUTPATIENT
Start: 2025-02-12

## 2025-02-12 RX ORDER — FOLIC ACID 1 MG/1
2 TABLET ORAL DAILY
Qty: 180 TABLET | Refills: 1 | Status: SHIPPED | OUTPATIENT
Start: 2025-02-12

## 2025-02-13 ENCOUNTER — PATIENT MESSAGE (OUTPATIENT)
Dept: FAMILY MEDICINE CLINIC | Facility: CLINIC | Age: 45
End: 2025-02-13

## 2025-02-13 NOTE — TELEPHONE ENCOUNTER
Called patient's pharmacy and spoke with pharmacist. Insurance only covers 90 day prescription will not cover 30 days of her hydrochlorothiazide. [facility] is requesting 90 day supply.    Please review and advise.

## 2025-03-04 DIAGNOSIS — G43.009 MIGRAINE WITHOUT AURA AND WITHOUT STATUS MIGRAINOSUS, NOT INTRACTABLE: ICD-10-CM

## 2025-03-04 NOTE — TELEPHONE ENCOUNTER
Medication: SUMATRIPTAN 50 MG Oral Tab      Date of last refill: 11/21/2024 (#9/2)  [place]     /Last office visit: 7/31/2024   Due back to clinic per last office note:  4 Months   Date next office visit scheduled:    Future Appointments   Date Time Provider Department Center   3/12/2025  8:55 AM Katherine Cameron, DO [facility] [place]   3/26/2025  4:30 PM Leni Schumacher, DO [facility] [place]   8/7/2025  1:40 PM Adriane Blanton MD [facility] [address]           Last OV note recommendation:    ASSESSMENT/ACTIVE PROBLEM LIST:           Encounter Diagnoses   Name Primary?    Migraine without aura and without status migrainosus, not intractable Yes    Abnormal finding on MRI of brain      Word finding difficulty           Discussion/Plan:  Migraines without aura  Topamax not effective. Recommend trial of nortriptyline 20mg (will then stop or lower trazodone)  If SE with nortriptyline, trial Depakote. Patient had tubal ligation, no chance of problems  Decrease trazodone to 50mg x 2 nights, then 25mg x 2-3 nights, then stop. Can add back 25 or 50mg if needed  Start magnesium threonate or glycinate, not more than 400mg nightly  Switch to fiorcet from sumatriptan as abortive  To know triggers and lifestyle behaviors including limiting caffeine intake, not skipping meals, having regular sleep schedule and practicing good sleep hygiene, avoid migraine food triggers/try to identify if any of them are triggers (nitrates, aged cheeses, red wine, chocolate, msg, artificial sweetener). Also discussed medication overuse headache including taking abortive medications, or any combination of them, on more than 2-3 days of the week, and to take abortive medication immediately at onset of headache     Nonspecific mild white matter linear, very small, left periventricular T2 hyperintensity- likely nonspecific microischemic white matter abnormality, possibly related to migraines.  As not clear from imaging, and recommended by radiology, repeat MRI brain for comparison, to make sure no signs of demyelinating disease, as clinically questioned. Work up for MS was otherwise negative. Repeat MRI brain.        Word finding difficulty- recheck B12 level, was 315 lower end in 9/2020. Improved since stopping topamax but still present occasionally        Requested Prescriptions             Signed Prescriptions Disp Refills    nortriptyline 10 MG Oral Cap 60 capsule 1       Sig: Take 1 cap nightly for 3-4 days, then increase to 2 caps nightly if tolerating    butalbital-acetaminophen-caffeine -40 MG Oral Tab 15 tablet 1       Sig: Take at onset of migraine, can repeat in 4-6 hours. Do not take more than 2-3 times a week               We discussed in depth regarding the diagnosis, prognosis, treatment. The patient was given ample opportunity to ask questions. All questions and concerns were addressed. 37 minutes were spent on this encounter, which included obtaining and reviewing history, examining the patient, reviewing and interpreting results, building a treatment plan, discussing treatment options, discussing medication instructions, educating the patient/family/caregiver, and completing documentation.         Stacie Cameron, DO  Neuromuscular and General Neurology  [facility]    /

## 2025-03-05 ENCOUNTER — TELEPHONE (OUTPATIENT)
Dept: NEUROLOGY | Facility: CLINIC | Age: 45
End: 2025-03-05

## 2025-03-05 RX ORDER — SUMATRIPTAN 50 MG/1
TABLET, FILM COATED ORAL
Qty: 9 TABLET | Refills: 2 | Status: SHIPPED | OUTPATIENT
Start: 2025-03-05

## 2025-03-05 NOTE — TELEPHONE ENCOUNTER
Approval Details    Authorization number: [id number]  Authorized from March 5, 2025 to March 5, 2026

## 2025-03-12 ENCOUNTER — OFFICE VISIT (OUTPATIENT)
Dept: NEUROLOGY | Facility: CLINIC | Age: 45
End: 2025-03-12
Payer: COMMERCIAL

## 2025-03-12 VITALS
BODY MASS INDEX: 21 KG/M2 | RESPIRATION RATE: 16 BRPM | DIASTOLIC BLOOD PRESSURE: 86 MMHG | HEART RATE: 102 BPM | WEIGHT: 137 LBS | SYSTOLIC BLOOD PRESSURE: 118 MMHG

## 2025-03-12 DIAGNOSIS — M06.09 RHEUMATOID ARTHRITIS OF MULTIPLE SITES WITHOUT RHEUMATOID FACTOR (HCC): ICD-10-CM

## 2025-03-12 DIAGNOSIS — G43.009 MIGRAINE WITHOUT AURA AND WITHOUT STATUS MIGRAINOSUS, NOT INTRACTABLE: Primary | ICD-10-CM

## 2025-03-12 DIAGNOSIS — G47.00 INSOMNIA, UNSPECIFIED TYPE: ICD-10-CM

## 2025-03-12 DIAGNOSIS — E34.8 PINEAL GLAND CYST: ICD-10-CM

## 2025-03-12 PROCEDURE — 99214 OFFICE O/P EST MOD 30 MIN: CPT | Performed by: OTHER

## 2025-03-12 RX ORDER — FREMANEZUMAB-VFRM 225 MG/1.5ML
225 INJECTION SUBCUTANEOUS
Qty: 3 EACH | Refills: 11 | Status: SHIPPED | OUTPATIENT
Start: 2025-03-12

## 2025-03-12 NOTE — PROGRESS NOTES
Migraines are much better controlled with Ajovy.  Reports maybe 3-4/30 days lasting a few hours Sumtriptan is helping as abortive.

## 2025-03-12 NOTE — PATIENT INSTRUCTIONS
Refill policies:    Allow 2-3 business days for refills; controlled substances may take longer.  Contact your pharmacy at least 5 days prior to running out of medication and have them send an electronic request or submit request through the “request refill” option in your [employer] account.  Refills are not addressed on weekends; covering physicians do not authorize routine medications on weekends.  No narcotics or controlled substances are refilled after noon on Fridays or by on call physicians.  By law, narcotics must be electronically prescribed.  A 30 day supply with no refills is the maximum allowed.  If your prescription is due for a refill, you may be due for a follow up appointment.  To best provide you care, patients receiving routine medications need to be seen at least once a year.  Patients receiving narcotic/controlled substance medications need to be seen at least once every 3 months.  In the event that your preferred pharmacy does not have the requested medication in stock (e.g. Backordered), it is your responsibility to find another pharmacy that has the requested medication available.  We will gladly send a new prescription to that pharmacy at your request.    Scheduling Tests:    If your physician has ordered radiology tests such as MRI or CT scans, please contact Central Scheduling at [phone] right away to schedule the test.  Once scheduled, the [facility] Centralized Referral Team will work with your insurance carrier to obtain pre-certification or prior authorization.  Depending on your insurance carrier, approval may take 3-10 days.  It is highly recommended patients assure they have received an authorization before having a test performed.  If test is done without insurance authorization, patient may be responsible for the entire amount billed.      Precertification and Prior Authorizations:  If your physician has recommended that you have a procedure or additional testing performed the [facility]  Centralized Referral Team will contact your insurance carrier to obtain pre-certification or prior authorization.    You are strongly encouraged to contact your insurance carrier to verify that your procedure/test has been approved and is a COVERED benefit.  Although the [facility] Centralized Referral Team does its due diligence, the insurance carrier gives the disclaimer that \"Although the procedure is authorized, this does not guarantee payment.\"    Ultimately the patient is responsible for payment.   Thank you for your understanding in this matter.  Paperwork Completion:  If you require FMLA or disability paperwork for your recovery, please make sure to either drop it off or have it faxed to our office at [phone]. Be sure the form has your name and date of birth on it.  The form will be faxed to our Forms Department and they will complete it for you.  There is a 25$ fee for all forms that need to be filled out.  Please be aware there is a 10-14 day turnaround time.  You will need to sign a release of information (CLIVE) form if your paperwork does not come with one.  You may call the Forms Department with any questions at [phone].  Their fax number is [phone].

## 2025-03-12 NOTE — PROGRESS NOTES
[facility]   Neurology    Karol Packer Patient Status:  No patient class for patient encounter    1980 MRN [id number]   Location [facility], [facility], [place] PCP Leni Schumacher DO              HPI:   Karol Packer is a(n) 44 year old female with history of lupus who presents at the request of Leni Schumacher DO for evaluation of abnormal MRI brain. She was getting evaluated for hearing loss, and had an MRI brain with abnormal findings. Has seen another neurologist, and had LP and MRI c-spine and told she did not have MS. Has some records with her. Loses her train of thought. Has worsening depression and anxiety, and is being evaluated by a psychiatrist.  Interim  Since LOV , reports migraines 2-3x or 3-4x/week x 2 years. Topiramate stopped being effective, weaned herself off about 6 weeks ago. Using Sumatriptan with limited relief. Has tried tylenol and ibuprofen with success. Gets 7 hours of sleep, takes trazodone. Eats 3 meals a day, tries to drink a lot of water. Coffee drinks 2 cups per week. Gets neck pain weekly. Taking sumatriptan, partly helps the pain once a week. She is an . Reports word finding has resolved with stopping Topamax. However, still has some word finding difficulty.   Interim  Since LOV, migraines improved on nortriptyline but had SE tremors and insomnia. She was switched to Avovy in  or 2024. She is taking sumatriptan as an abortive. Migraines are 4 days max in a month. Can be 1-2 per month.         Meds tried   Topamax- not effective  Nortriptyline- tremors, insomnia  Fiorcet- did not work    Pertinent imaging and laboratory work-up:   MRI brain 2024  CONCLUSION:    1. Benign pineal region cyst is stable.   2. Brain MRI is otherwise unremarkable.     B12  427  Component      Latest Ref Rng 2023   Immature Granulocyte %      %    TSH      0.358 - 3.740 mIU/mL 1.390        B12 9/10/20-  315    MRI cervical 1/2022- no abnormal cord signal, mild djd changes, no significant canal or foraminal changes    LP 2/2022-  Csf protein 4, csf glucose 79, csf WBC 0, csf oligoclonal bands neg    MRI brain 12/2021 personally reviewed- nonspecific mild white matter linear, very small, left periventricular    IMPRESSION:     1. Normal internal auditory canals bilaterally.   2. 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   3. A few small areas of T2 signal prolongation on FLAIR imaging perpendicularly oriented to the left   lateral ventricle in the region of the DVA, likely representing adjacent minimal gliosis..     RECOMMENDATIONS:  Follow up with clinical examination, and if warranted consider a follow-up MRI of   the brain with and without contrast in approximately 6 months to one year to reevaluate the T2   signal changes adjacent to the left lateral ventricle for stability, since this was not reported on   the previous studies.        Past Medical History:  Past Medical History:    Acute bronchitis due to severe acute respiratory syndrome coronavirus 2 (SARS-CoV-2)    Asthma (HCC)    Cervical polyp    Depression    Hip pain, acute, right    In area of anterior superior iliac spine    Lupus    Migraines    Status post bilateral salpingectomy    Unintentional weight loss    As of 7/19/2021 maintaining weight since May or June 2021        Past Surgical History:  Past Surgical History:   Procedure Laterality Date    Colonoscopy N/A 6/2/2021    Procedure: COLONOSCOPY;  Surgeon: Jose Heard DO;  Location:  ENDOSCOPY    Colposcopy, cervix inc upper/adjacent vagina      Colposcopy,loop electrd cervix excis      Conization cervix,loop electrd      Salpingectomy Bilateral 12/09/2020    Laparoscopy    Tonsillectomy         Family History:  family history includes Breast Cancer in her mother; HTN in her maternal  grandmother and mother; Heart Disorder in her maternal grandfather; Stroke in her father; acute MI in her maternal grandfather; hypothyroidism in her mother.      Social History:   reports that she quit smoking about 6 years ago. Her smoking use included cigarettes. She started smoking about 30 years ago. She has a 12 pack-year smoking history. She has never used smokeless tobacco. She reports current alcohol use. She reports that she does not use drugs.    Allergies:  Allergies   Allergen Reactions    Ceclor HIVES     CAPS      Cefaclor HIVES     CAPS    Cellcept ANXIETY, CONFUSION, DIZZINESS, FATIGUE, OTHER (SEE COMMENTS), PAIN and SHORTNESS OF BREATH    Mycophenolate ANXIETY, CONFUSION, DIZZINESS, FATIGUE, OTHER (SEE COMMENTS), PAIN and SHORTNESS OF BREATH     Other reaction(s): ANXIETY, CONFUSION, FATIGUE, OTHER (SEE COMMENTS), PAIN      Meloxicam OTHER (SEE COMMENTS)     Anxiety/Depression  Anxiety/Depression  Other reaction(s): OTHER (SEE COMMENTS)  Anxiety/Depression  Other reaction(s): OTHER (SEE COMMENTS)  Anxiety/Depression       MEDICATIONS:    Current Outpatient Medications:     Fremanezumab-vfrm (AJOVY) 225 MG/1.5ML Subcutaneous Solution Auto-injector, Inject 225 mg into the skin every 30 (thirty) days., Disp: 3 each, Rfl: 11    SUMATRIPTAN 50 MG Oral Tab, USE AT ONSET REPEAT ONCE AFTER 2 HOURS-ONLY 2 IN 24 HR MAX. THIS IS A 30 DAY SUPPLY., Disp: 9 tablet, Rfl: 2    hydroCHLOROthiazide 12.5 MG Oral Cap, Take 1 capsule (12.5 mg total) by mouth every morning., Disp: 90 capsule, Rfl: 0    FOLIC ACID 1 MG Oral Tab, TAKE 2 TABLETS BY MOUTH EVERY DAY, Disp: 180 tablet, Rfl: 1    diclofenac 75 MG Oral Tab EC, Take 1 tablet (75 mg total) by mouth 2 (two) times daily., Disp: , Rfl:     hydroxychloroquine 200 MG Oral Tab, Take 2 tablets (400 mg total) by mouth daily., Disp: 60 tablet, Rfl: 1    Methotrexate Sodium, PF, 50 MG/2ML Injection Solution, Inject 0.8 mL (20 mg total) into the skin once a week. Inject 20  mg as directed, Disp: 4 each, Rfl: 1    montelukast 10 MG Oral Tab, Take 1 tablet (10 mg total) by mouth nightly., Disp: 90 tablet, Rfl: 1    atomoxetine 60 MG Oral Cap, Take 1 capsule (60 mg total) by mouth daily., Disp: , Rfl:     gabapentin 300 MG Oral Cap, Take 2 capsules (600 mg total) by mouth nightly., Disp: 60 capsule, Rfl: 5    gabapentin 100 MG Oral Cap, Take 2 capsules at bedtime for a total of 800 mg nightly., Disp: 180 capsule, Rfl: 5    Syringe/Needle, Disp, (BD SAFETYGLIDE SYRINGE/NEEDLE) 27G X 5/8\" 1 ML Does not apply Misc, To be used for Methotrexate injection once weekly, Disp: 16 each, Rfl: 3    traZODone 50 MG Oral Tab, Take 1 tablet (50 mg total) by mouth. 0.5-1 tablet by mouth nightly as needed, Disp: , Rfl:     albuterol (PROAIR HFA) 108 (90 Base) MCG/ACT Inhalation Aero Soln, Inhale 2 puffs into the lungs every 6 (six) hours as needed for Wheezing or Shortness of Breath (Cough)., Disp: 1 each, Rfl: 0    BD TB SYRINGE 25G X 5/8\" 1 ML Does not apply Misc, , Disp: , Rfl:     busPIRone HCl 15 MG Oral Tab, Take by mouth 2 (two) times a day.  , Disp: , Rfl:     Cholecalciferol (VITAMIN D3) 125 MCG (5000 UT) Oral Tab, Take 1 tablet (5,000 Units total) by mouth daily., Disp: , Rfl:     loratadine 10 MG Oral Tab, Take 1 tablet (10 mg total) by mouth daily., Disp: , Rfl:     Sertraline HCl 100 MG Oral Tab, Take 2 tablets (200 mg total) by mouth daily. Take 2 tab once a day, Disp: , Rfl:     Fluticasone Propionate 50 MCG/ACT Nasal Suspension, 2 sprays by Each Nare route daily., Disp: 1 Bottle, Rfl: 3    nortriptyline 10 MG Oral Cap, Take 2 capsules (20 mg total) by mouth nightly. (Patient not taking: Reported on 3/12/2025), Disp: 60 capsule, Rfl: 2      Review of Systems:   A comprehensive 10 point review of systems was completed.     Pertinent positives and negatives noted in the HPI.         PHYSICAL EXAM:   Neurologic Exam  Vitals  Vitals:    03/12/25 0853   BP: 118/86   Pulse: 102   Resp: 16        General Appearance: Patient is a 44 year old female in no acute distress  Cardiac: Normal rate & regular rhythm  Skin: There are no rashes or other skin lesions.  Musculoskeletal: There is no scoliosis, or joint deformities  Neurologic examination:  Mental status: Patient is alert, attentive, and oriented x 3. Language is coherent and fluent without aphasia. Memory, comprehension and ability to follow commands were intact.   Cranial nerves II-XII: Optic discs were sharp. Pupils were round and reacted to light. Extraocular movements were full. There was no face, jaw, palate or tongue weakness or atrophy. Facial sensation was normal. Hearing was grossly intact. Shoulder shrug was normal.   Motor exam revealed normal muscle bulk and tone. No atrophy or fasciculations. Manual muscle testing revealed MRC grade 5/5 strength throughout including proximal and distal muscles of the arms and legs.  Deep tendon reflexes were 2 at the biceps, brachioradialis, triceps, knee jerk, and ankle jerk. Plantar responses were flexor bilaterally.   Sensory exam revealed normal light touch perception. Vibratory perception and proprioception were intact at the toes. Pinprick and temperature were normal. Romberg sign was absent.  Complex motor skills revealed normal coordination. Finger-nose-finger intact.   Gait was narrow and stable, was able to walk on heels, toes and tandem without any difficulty.     ASSESSMENT/ACTIVE PROBLEM LIST:     Encounter Diagnoses   Name Primary?    Migraine without aura and without status migrainosus, not intractable Yes    Insomnia, unspecified type     Pineal gland cyst        Discussion/Plan:  Migraines without aura  Has failed topamax and nortriptyline   Continue Ajovy 225mg monthly  Continue sumatriptan 50mg at onset of migraines, not more than 2-3 times per week    Insomnia-  Back on trazodone 75mg nightly  On buspar 15mg BID no zoloft 200mg daily  Continue magnesium glycinate  Has anxiety,  depression, and ADHD- on     Nonspecific mild white matter linear, very small, left periventricular T2 hyperintensity- likely nonspecific microischemic white matter abnormality, possibly related to migraines. Repeat MRI showed no changes, radiologist did not mention this abnormality, I did not see it on the repeat MRI brain      Pineal cyst, 9mm- stable from 2020 to 2024.      Word finding difficulty-. Improved since stopping topamax but still present occasionally. B12 rechecked 2024, 427      Requested Prescriptions     Signed Prescriptions Disp Refills    Fremanezumab-vfrm (AJOVY) 225 MG/1.5ML Subcutaneous Solution Auto-injector 3 each 11     Sig: Inject 225 mg into the skin every 30 (thirty) days.          We discussed in depth regarding the diagnosis, prognosis, treatment. The patient was given ample opportunity to ask questions. All questions and concerns were addressed.       Stacie Cameron,   Neuromuscular and General Neurology  [facility]

## 2025-03-13 RX ORDER — METHOTREXATE 25 MG/ML
20 INJECTION INTRA-ARTERIAL; INTRAMUSCULAR; INTRATHECAL; INTRAVENOUS WEEKLY
Qty: 4 EACH | Refills: 1 | Status: SHIPPED | OUTPATIENT
Start: 2025-03-13

## 2025-03-13 NOTE — TELEPHONE ENCOUNTER
LOV: 02/05/2025    Future Appointments   Date Time Provider Department Center   3/26/2025  4:30 PM Leni Schumacher DO [facility] [place]   8/7/2025  1:40 PM Adriane Blanton MD [id number] [address]   11/12/2025  8:35 AM Katherine Cameron DO [facility] [place]       LF: 01/30/2025    QTY: 8 mL    Refills: 0    LABS:   Component      Latest Ref Rng 1/25/2025 1/30/2025   WBC      4.0 - 11.0 x10(3) uL 4.4     RBC      3.80 - 5.30 x10(6)uL 4.47     Hemoglobin      12.0 - 16.0 g/dL 14.6     Hematocrit      35.0 - 48.0 % 43.8     Platelet Count      150.0 - 450.0 10(3)uL 257.0     MCV      80.0 - 100.0 fL 98.0     MCH      26.0 - 34.0 pg 32.7     MCHC      31.0 - 37.0 g/dL 33.3     RDW      % 13.4     Prelim Neutrophil Abs      1.50 - 7.70 x10 (3) uL 1.68     Neutrophils Absolute      1.50 - 7.70 x10(3) uL 1.68     Lymphocytes Absolute      1.00 - 4.00 x10(3) uL 1.97     Monocytes Absolute      0.10 - 1.00 x10(3) uL 0.56     Eosinophils Absolute      0.00 - 0.70 x10(3) uL 0.18     Basophils Absolute      0.00 - 0.20 x10(3) uL 0.02     Immature Granulocyte Absolute      0.00 - 1.00 x10(3) uL 0.01     Neutrophils %      % 37.9     Lymphocytes %      % 44.6     Monocytes %      % 12.7     Eosinophils %      % 4.1     Basophils %      % 0.5     Immature Granulocyte %      % 0.2     Glucose      70 - 99 mg/dL 107 (H)     Sodium      136 - 145 mmol/L 137     Potassium      3.5 - 5.1 mmol/L 3.7     Chloride      98 - 112 mmol/L 99     Carbon Dioxide, Total      21.0 - 32.0 mmol/L 31.0     ANION GAP      0 - 18 mmol/L 7     BUN      9 - 23 mg/dL 9     CREATININE      0.55 - 1.02 mg/dL 0.67     CALCIUM      8.7 - 10.6 mg/dL 9.4     CALCULATED OSMOLALITY      275 - 295 mOsm/kg 283     EGFR      >=60 mL/min/1.73m2 110     AST (SGOT)      <34 U/L 21     ALT (SGPT)      10 - 49 U/L 10     ALKALINE PHOSPHATASE      37 - 98 U/L 66     Total Bilirubin      0.3 - 1.2 mg/dL 0.4     PROTEIN, TOTAL      5.7 - 8.2 g/dL 7.2      Albumin      3.2 - 4.8 g/dL 4.6     Globulin      2.0 - 3.5 g/dL 2.6     A/G Ratio      1.0 - 2.0  1.8     Patient Fasting for CMP? No     SED RATE      0 - 20 mm/Hr  4       Legend:  (H) High

## 2025-03-26 ENCOUNTER — OFFICE VISIT (OUTPATIENT)
Dept: FAMILY MEDICINE CLINIC | Facility: CLINIC | Age: 45
End: 2025-03-26
Payer: COMMERCIAL

## 2025-03-26 VITALS
SYSTOLIC BLOOD PRESSURE: 112 MMHG | OXYGEN SATURATION: 99 % | HEIGHT: 67 IN | DIASTOLIC BLOOD PRESSURE: 72 MMHG | RESPIRATION RATE: 18 BRPM | WEIGHT: 140.19 LBS | TEMPERATURE: 98 F | BODY MASS INDEX: 22 KG/M2 | HEART RATE: 90 BPM

## 2025-03-26 DIAGNOSIS — J45.40 MODERATE PERSISTENT ASTHMA WITHOUT COMPLICATION (HCC): ICD-10-CM

## 2025-03-26 DIAGNOSIS — J30.89 PERENNIAL ALLERGIC RHINITIS WITH SEASONAL VARIATION: ICD-10-CM

## 2025-03-26 DIAGNOSIS — R60.0 BILATERAL LOWER EXTREMITY EDEMA: Primary | ICD-10-CM

## 2025-03-26 DIAGNOSIS — R52 BURNING PAIN: ICD-10-CM

## 2025-03-26 DIAGNOSIS — M79.651 PAIN OF RIGHT THIGH: ICD-10-CM

## 2025-03-26 DIAGNOSIS — J30.2 PERENNIAL ALLERGIC RHINITIS WITH SEASONAL VARIATION: ICD-10-CM

## 2025-03-26 DIAGNOSIS — Z98.890 HISTORY OF ARTHROSCOPY OF HIP: ICD-10-CM

## 2025-03-26 DIAGNOSIS — Z79.899 ENCOUNTER FOR LONG-TERM CURRENT USE OF MEDICATION: ICD-10-CM

## 2025-03-26 PROCEDURE — 99214 OFFICE O/P EST MOD 30 MIN: CPT | Performed by: FAMILY MEDICINE

## 2025-03-26 RX ORDER — HYDROCHLOROTHIAZIDE 12.5 MG/1
12.5 CAPSULE ORAL EVERY MORNING
Qty: 90 CAPSULE | Refills: 1 | Status: SHIPPED | OUTPATIENT
Start: 2025-03-26

## 2025-03-26 RX ORDER — MONTELUKAST SODIUM 10 MG/1
10 TABLET ORAL NIGHTLY
Qty: 90 TABLET | Refills: 1 | Status: SHIPPED | OUTPATIENT
Start: 2025-03-26

## 2025-03-26 RX ORDER — CYCLOBENZAPRINE HCL 10 MG
10 TABLET ORAL 3 TIMES DAILY PRN
COMMUNITY
Start: 2025-02-17

## 2025-03-26 NOTE — PROGRESS NOTES
Karol Packer is a 44 year old female.     Chief Complaint   Patient presents with    Leg Swelling     Follow up for bilateral leg swelling and pain patient states swelling is better denies pain     Asthma    Pain     Right anterior proximal thigh         HPI:         Bilateral lower extremity edema:  This is patient's first follow-up office visit since being prescribed hydrochlorothiazide 12.5 mg daily 9/27/2024.  Patient reports the hydrochlorothiazide completely relieved the swelling of both of her lower legs.      Moderate persistent asthma:  Symptoms are currently well controlled on montelukast.  Patient taking montelukast 10 mg nightly.  No side effects to the montelukast noticed.  Uses albuterol as needed with good relief.      Allergic rhinitis:  Symptoms are controlled on montelukast.          Pain of right thigh/Burning pain:  Patient points to right proximal anterior thigh.  Nature is a burning sensation.  Patient is status post right hip arthroscopy with labral repair and capsular closure on 11/14/2024 by Dr. Arnold.        Wt Readings from Last 6 Encounters:   03/26/25 140 lb 3.2 oz (63.6 kg)   03/12/25 137 lb (62.1 kg)   02/05/25 138 lb (62.6 kg)   11/14/24 133 lb 3.2 oz (60.4 kg)   10/10/24 130 lb (59 kg)   09/27/24 135 lb (61.2 kg)      Body mass index is 21.96 kg/m².        Current Outpatient Medications   Medication Sig Dispense Refill    cyclobenzaprine 10 MG Oral Tab Take 1 tablet (10 mg total) by mouth 3 (three) times daily as needed for Muscle spasms.      hydroCHLOROthiazide 12.5 MG Oral Cap Take 1 capsule (12.5 mg total) by mouth every morning. 90 capsule 1    montelukast 10 MG Oral Tab Take 1 tablet (10 mg total) by mouth nightly. 90 tablet 1    Methotrexate Sodium, PF, 50 MG/2ML Injection Solution Inject 0.8 mL (20 mg total) into the skin once a week. Inject 20 mg as directed 4 each 1    Fremanezumab-vfrm (AJOVY) 225 MG/1.5ML Subcutaneous Solution Auto-injector Inject 225 mg into the  skin every 30 (thirty) days. 3 each 11    SUMATRIPTAN 50 MG Oral Tab USE AT ONSET REPEAT ONCE AFTER 2 HOURS-ONLY 2 IN 24 HR MAX. THIS IS A 30 DAY SUPPLY. 9 tablet 2    FOLIC ACID 1 MG Oral Tab TAKE 2 TABLETS BY MOUTH EVERY  tablet 1    diclofenac 75 MG Oral Tab EC Take 1 tablet (75 mg total) by mouth 2 (two) times daily.      hydroxychloroquine 200 MG Oral Tab Take 2 tablets (400 mg total) by mouth daily. 60 tablet 1    nortriptyline 10 MG Oral Cap Take 2 capsules (20 mg total) by mouth nightly. 60 capsule 2    atomoxetine 60 MG Oral Cap Take 1 capsule (60 mg total) by mouth daily.      gabapentin 300 MG Oral Cap Take 2 capsules (600 mg total) by mouth nightly. 60 capsule 5    gabapentin 100 MG Oral Cap Take 2 capsules at bedtime for a total of 800 mg nightly. 180 capsule 5    Syringe/Needle, Disp, (BD SAFETYGLIDE SYRINGE/NEEDLE) 27G X 5/8\" 1 ML Does not apply Misc To be used for Methotrexate injection once weekly 16 each 3    traZODone 50 MG Oral Tab Take 1 tablet (50 mg total) by mouth. 0.5-1 tablet by mouth nightly as needed      albuterol (PROAIR HFA) 108 (90 Base) MCG/ACT Inhalation Aero Soln Inhale 2 puffs into the lungs every 6 (six) hours as needed for Wheezing or Shortness of Breath (Cough). 1 each 0    BD TB SYRINGE 25G X 5/8\" 1 ML Does not apply Misc       busPIRone HCl 15 MG Oral Tab Take by mouth 2 (two) times a day.        Cholecalciferol (VITAMIN D3) 125 MCG (5000 UT) Oral Tab Take 1 tablet (5,000 Units total) by mouth daily.      loratadine 10 MG Oral Tab Take 1 tablet (10 mg total) by mouth daily.      Sertraline HCl 100 MG Oral Tab Take 2 tablets (200 mg total) by mouth daily. Take 2 tab once a day      Fluticasone Propionate 50 MCG/ACT Nasal Suspension 2 sprays by Each Nare route daily. 1 Bottle 3      Past Medical History:    Acute bronchitis due to severe acute respiratory syndrome coronavirus 2 (SARS-CoV-2)    Asthma (HCC)    Cervical polyp    Depression    Hip pain, acute, right    In  area of anterior superior iliac spine    Lupus    Migraines    Status post bilateral salpingectomy    Unintentional weight loss    As of 2021 maintaining weight since May or 2021      Past Surgical History:   Procedure Laterality Date    Colonoscopy N/A 2021    Procedure: COLONOSCOPY;  Surgeon: Jose Heard DO;  Location:  ENDOSCOPY    Colposcopy, cervix inc upper/adjacent vagina      Colposcopy,loop electrd cervix excis      Conization cervix,loop electrd      Hip arthroscopy  2024    Dr. Arnold right hip arthroscopy with labral repair and capsular closure on 2024    Salpingectomy Bilateral 2020    Laparoscopy    Tonsillectomy        Social History:    Social History     Socioeconomic History    Marital status: Single   Tobacco Use    Smoking status: Former     Current packs/day: 0.00     Average packs/day: 0.5 packs/day for 24.0 years (12.0 ttl pk-yrs)     Types: Cigarettes     Start date: 1995     Quit date: 2019     Years since quittin.2    Smokeless tobacco: Never   Vaping Use    Vaping status: Never Used   Substance and Sexual Activity    Alcohol use: Yes     Comment: social    Drug use: No    Sexual activity: Not Currently   Other Topics Concern    Caffeine Concern Yes     Comment: 2 cups of coffee daily    Exercise No     Comment: Daily walking    Seat Belt Yes         Family History   Problem Relation Age of Onset    Other (acute MI[other]) Maternal Grandfather     Heart Disorder Maternal Grandfather     Other (HTN[other]) Maternal Grandmother     Other (HTN[other]) Mother     Other (hypothyroidism[other]) Mother     Breast Cancer Mother         63 years ols    Stroke Father         59 years old     REVIEW OF SYSTEMS:   GENERAL HEALTH: Overall feels well.    INTEGUMENT: denies any unusual skin lesions or rashes   RESPIRATORY: Denies: MEL/PALMA/Cough/Wheeze  CARDIOVASCULAR: Denies CP/palpitations  VASCULAR: As in HPI  Musculoskeletal: As in HPI  GI: Denies  abdominal pain/nausea/vomiting/blood in stool/black stool/bloating/constipation/diarrhea  : denies urinary symptoms  NEURO: denies headaches/dizziness  PSYCH: denies SI/HI    Immunization History   Administered Date(s) Administered    Covid-19 Vaccine Pfizer 30 mcg/0.3 ml 03/25/2021, 04/22/2021, 10/21/2021    DTP 09/04/1980, 12/26/1980, 03/04/1981, 05/05/1982    FLULAVAL 6 months & older 0.5 ml Prefilled syringe (95775) 11/05/2020, 12/07/2021    Influenza 11/05/2020, 12/07/2021, 03/24/2023    MMR 03/03/1982    OPV 09/04/1980, 12/26/1980, 05/05/1982    Pneumococcal Conjugate PCV20 08/16/2023    TDAP 04/02/2018, 05/15/2021   Deferred Date(s) Deferred    Influenza Vaccine Refused 09/23/2020       EXAM:   /72   Pulse 90   Temp 97.9 °F (36.6 °C)   Resp 18   Ht 5' 7\" (1.702 m)   Wt 140 lb 3.2 oz (63.6 kg)   LMP 11/04/2024 (Exact Date)   SpO2 99%   BMI 21.96 kg/m²   GENERAL: NAD, pleasant well-appearing  female  INTEGUMENT: no visible rashes  HEAD: NCAT  LUNGS: CTA A/P no wheezes/ronchi/rales/crackles  CARDIO: RRR, +S1/S2, no mm  VASCULAR: No lower extremity edema  EXTREMITIES: no cyanosis or clubbing  NEURO: Alert and Oriented x3  PSYCH: Affect normal.  Normal thought content.        DATA:    Results for orders placed or performed in visit on 01/25/25   Comp Metabolic Panel (14) [E]    Collection Time: 01/25/25  7:14 AM   Result Value Ref Range    Glucose 107 (H) 70 - 99 mg/dL    Sodium 137 136 - 145 mmol/L    Potassium 3.7 3.5 - 5.1 mmol/L    Chloride 99 98 - 112 mmol/L    CO2 31.0 21.0 - 32.0 mmol/L    Anion Gap 7 0 - 18 mmol/L    BUN 9 9 - 23 mg/dL    Creatinine 0.67 0.55 - 1.02 mg/dL    Calcium, Total 9.4 8.7 - 10.6 mg/dL    Calculated Osmolality 283 275 - 295 mOsm/kg    eGFR-Cr 110 >=60 mL/min/1.73m2    AST 21 <34 U/L    ALT 10 10 - 49 U/L    Alkaline Phosphatase 66 37 - 98 U/L    Bilirubin, Total 0.4 0.3 - 1.2 mg/dL    Total Protein 7.2 5.7 - 8.2 g/dL    Albumin 4.6 3.2 - 4.8 g/dL     Globulin  2.6 2.0 - 3.5 g/dL    A/G Ratio 1.8 1.0 - 2.0    Patient Fasting for CMP? No    CBC W Differential W Platelet [E]    Collection Time: 01/25/25  7:14 AM   Result Value Ref Range    WBC 4.4 4.0 - 11.0 x10(3) uL    RBC 4.47 3.80 - 5.30 x10(6)uL    HGB 14.6 12.0 - 16.0 g/dL    HCT 43.8 35.0 - 48.0 %    .0 150.0 - 450.0 10(3)uL    MCV 98.0 80.0 - 100.0 fL    MCH 32.7 26.0 - 34.0 pg    MCHC 33.3 31.0 - 37.0 g/dL    RDW 13.4 %    Neutrophil Absolute Prelim 1.68 1.50 - 7.70 x10 (3) uL    Neutrophil Absolute 1.68 1.50 - 7.70 x10(3) uL    Lymphocyte Absolute 1.97 1.00 - 4.00 x10(3) uL    Monocyte Absolute 0.56 0.10 - 1.00 x10(3) uL    Eosinophil Absolute 0.18 0.00 - 0.70 x10(3) uL    Basophil Absolute 0.02 0.00 - 0.20 x10(3) uL    Immature Granulocyte Absolute 0.01 0.00 - 1.00 x10(3) uL    Neutrophil % 37.9 %    Lymphocyte % 44.6 %    Monocyte % 12.7 %    Eosinophil % 4.1 %    Basophil % 0.5 %    Immature Granulocyte % 0.2 %   Anti-dsDNA Antibody, IgG    Collection Time: 01/25/25  7:14 AM   Result Value Ref Range    Anti-dsDNA antibody 5.3 <10 IU/mL   Sed Rate, Janesren (Automated)    Collection Time: 01/30/25  3:01 PM   Result Value Ref Range    Sed Rate 4 0 - 20 mm/Hr           ASSESSMENT AND PLAN:       Encounter Diagnoses   Name Primary?    Bilateral lower extremity edema Yes    Moderate persistent asthma without complication (HCC)     Perennial allergic rhinitis with seasonal variation     Encounter for long-term current use of medication     Pain of right thigh     Burning pain     History of arthroscopy of hip        Dr. Arnold right hip arthroscopy with labral repair and capsular closure on 11/14/2024       1. Bilateral lower extremity edema  This is patient's first follow-up office visit since being prescribed hydrochlorothiazide 12.5 mg daily 9/27/2024.  Greatly improved on hydrochlorothiazide 12.5 mg daily and kidney function and electrolytes remained stable.  Okay to continue the  hydrochlorothiazide 12.5 mg daily.  Follow-up in 6 months, sooner if needed.    - hydroCHLOROthiazide 12.5 MG Oral Cap; Take 1 capsule (12.5 mg total) by mouth every morning.  Dispense: 90 capsule; Refill: 1    2. Moderate persistent asthma without complication (HCC)  Symptoms are currently well controlled on montelukast.  Recommend continue montelukast as below.  Continue albuterol as needed.  Follow-up in 6 months, sooner if needed.    - montelukast 10 MG Oral Tab; Take 1 tablet (10 mg total) by mouth nightly.  Dispense: 90 tablet; Refill: 1    3. Perennial allergic rhinitis with seasonal variation  Symptoms are controlled on montelukast.  Recommend continue montelukast as below.  Okay to take OTC antihistamine if needed as well.  Follow-up in 6 months, sooner if needed.    - montelukast 10 MG Oral Tab; Take 1 tablet (10 mg total) by mouth nightly.  Dispense: 90 tablet; Refill: 1    4. Encounter for long-term current use of medication  Medication use, risks, benefits, side effects and precautions discussed, patient verbalizes understanding. Questions encouraged and answered to patient's satisfaction.    - hydroCHLOROthiazide 12.5 MG Oral Cap; Take 1 capsule (12.5 mg total) by mouth every morning.  Dispense: 90 capsule; Refill: 1  - montelukast 10 MG Oral Tab; Take 1 tablet (10 mg total) by mouth nightly.  Dispense: 90 tablet; Refill: 1    5. Pain of right thigh  6. Burning pain  Meralgia paresthetica?  Related to recent right hip arthroscopy?  Recommend patient follow-up with her surgeon regarding this.    7. History of arthroscopy of hip  Dr. Arnold right hip arthroscopy with labral repair and capsular closure on 11/14/2024.             Meds & Refills for this Visit:  Requested Prescriptions     Signed Prescriptions Disp Refills    hydroCHLOROthiazide 12.5 MG Oral Cap 90 capsule 1     Sig: Take 1 capsule (12.5 mg total) by mouth every morning.    montelukast 10 MG Oral Tab 90 tablet 1     Sig: Take 1 tablet (10 mg  total) by mouth nightly.       Return in about 6 months (around 9/15/2025) for Annual wellness visit, leg swelling, and asthma. Sooner if needed.

## 2025-04-09 DIAGNOSIS — M79.7 FIBROMYALGIA: Primary | ICD-10-CM

## 2025-04-09 RX ORDER — SYRINGE, DISPOSABLE, 1 ML
SYRINGE, EMPTY DISPOSABLE MISCELLANEOUS
Qty: 16 EACH | Refills: 3 | Status: SHIPPED | OUTPATIENT
Start: 2025-04-09

## 2025-04-09 NOTE — TELEPHONE ENCOUNTER
BD TB SYRINGE 25GX5/8\"    Last office visit: 2/5/25    Next Rheum Apt:8/7/2025 Adriane Blanton MD    Last fill: 1/3/22    Labs:   Lab Results   Component Value Date    CREATSERUM 0.67 01/25/2025    ALKPHO 66 01/25/2025    AST 21 01/25/2025    ALT 10 01/25/2025    BILT 0.4 01/25/2025    TP 7.2 01/25/2025    ALB 4.6 01/25/2025       Lab Results   Component Value Date    WBC 4.4 01/25/2025    HGB 14.6 01/25/2025    .0 01/25/2025    NEPRELIM 1.68 01/25/2025    NEPERCENT 37.9 01/25/2025    LYPERCENT 44.6 01/25/2025    NE 1.68 01/25/2025    LYMABS 1.97 01/25/2025

## 2025-04-26 ENCOUNTER — LAB ENCOUNTER (OUTPATIENT)
Dept: LAB | Age: 45
End: 2025-04-26
Attending: INTERNAL MEDICINE
Payer: COMMERCIAL

## 2025-04-26 DIAGNOSIS — Z51.81 THERAPEUTIC DRUG MONITORING: ICD-10-CM

## 2025-04-26 DIAGNOSIS — M79.7 FIBROMYALGIA: ICD-10-CM

## 2025-04-26 LAB
ALBUMIN SERPL-MCNC: 4.7 G/DL (ref 3.2–4.8)
ALBUMIN/GLOB SERPL: 2.4 {RATIO} (ref 1–2)
ALP LIVER SERPL-CCNC: 65 U/L (ref 37–98)
ALT SERPL-CCNC: 13 U/L (ref 10–49)
ANION GAP SERPL CALC-SCNC: 3 MMOL/L (ref 0–18)
AST SERPL-CCNC: 21 U/L (ref ?–34)
BASOPHILS # BLD AUTO: 0.05 X10(3) UL (ref 0–0.2)
BASOPHILS NFR BLD AUTO: 0.9 %
BILIRUB SERPL-MCNC: 0.2 MG/DL (ref 0.3–1.2)
BUN BLD-MCNC: 10 MG/DL (ref 9–23)
CALCIUM BLD-MCNC: 9.9 MG/DL (ref 8.7–10.6)
CHLORIDE SERPL-SCNC: 102 MMOL/L (ref 98–112)
CO2 SERPL-SCNC: 31 MMOL/L (ref 21–32)
CREAT BLD-MCNC: 0.73 MG/DL (ref 0.55–1.02)
EGFRCR SERPLBLD CKD-EPI 2021: 104 ML/MIN/1.73M2 (ref 60–?)
EOSINOPHIL # BLD AUTO: 0.31 X10(3) UL (ref 0–0.7)
EOSINOPHIL NFR BLD AUTO: 5.4 %
ERYTHROCYTE [DISTWIDTH] IN BLOOD BY AUTOMATED COUNT: 12.7 %
ERYTHROCYTE [SEDIMENTATION RATE] IN BLOOD: 6 MM/HR (ref 0–20)
FASTING STATUS PATIENT QL REPORTED: YES
GLOBULIN PLAS-MCNC: 2 G/DL (ref 2–3.5)
GLUCOSE BLD-MCNC: 82 MG/DL (ref 70–99)
HCT VFR BLD AUTO: 41.6 % (ref 35–48)
HGB BLD-MCNC: 13.8 G/DL (ref 12–16)
IMM GRANULOCYTES # BLD AUTO: 0.01 X10(3) UL (ref 0–1)
IMM GRANULOCYTES NFR BLD: 0.2 %
LYMPHOCYTES # BLD AUTO: 2.04 X10(3) UL (ref 1–4)
LYMPHOCYTES NFR BLD AUTO: 35.6 %
MCH RBC QN AUTO: 32.8 PG (ref 26–34)
MCHC RBC AUTO-ENTMCNC: 33.2 G/DL (ref 31–37)
MCV RBC AUTO: 98.8 FL (ref 80–100)
MONOCYTES # BLD AUTO: 0.53 X10(3) UL (ref 0.1–1)
MONOCYTES NFR BLD AUTO: 9.2 %
NEUTROPHILS # BLD AUTO: 2.79 X10 (3) UL (ref 1.5–7.7)
NEUTROPHILS # BLD AUTO: 2.79 X10(3) UL (ref 1.5–7.7)
NEUTROPHILS NFR BLD AUTO: 48.7 %
OSMOLALITY SERPL CALC.SUM OF ELEC: 280 MOSM/KG (ref 275–295)
PLATELET # BLD AUTO: 263 10(3)UL (ref 150–450)
POTASSIUM SERPL-SCNC: 3.8 MMOL/L (ref 3.5–5.1)
PROT SERPL-MCNC: 6.7 G/DL (ref 5.7–8.2)
RBC # BLD AUTO: 4.21 X10(6)UL (ref 3.8–5.3)
SODIUM SERPL-SCNC: 136 MMOL/L (ref 136–145)
WBC # BLD AUTO: 5.7 X10(3) UL (ref 4–11)

## 2025-04-26 PROCEDURE — 86225 DNA ANTIBODY NATIVE: CPT

## 2025-04-26 PROCEDURE — 85025 COMPLETE CBC W/AUTO DIFF WBC: CPT

## 2025-04-26 PROCEDURE — 80053 COMPREHEN METABOLIC PANEL: CPT

## 2025-04-26 PROCEDURE — 85652 RBC SED RATE AUTOMATED: CPT

## 2025-04-26 PROCEDURE — 36415 COLL VENOUS BLD VENIPUNCTURE: CPT

## 2025-04-28 DIAGNOSIS — M06.09 RHEUMATOID ARTHRITIS OF MULTIPLE SITES WITHOUT RHEUMATOID FACTOR (HCC): Primary | ICD-10-CM

## 2025-04-28 DIAGNOSIS — M06.09 RHEUMATOID ARTHRITIS OF MULTIPLE SITES WITHOUT RHEUMATOID FACTOR (HCC): ICD-10-CM

## 2025-04-28 RX ORDER — HYDROXYCHLOROQUINE SULFATE 200 MG/1
400 TABLET, FILM COATED ORAL DAILY
Qty: 180 TABLET | Refills: 0 | Status: SHIPPED | OUTPATIENT
Start: 2025-04-28

## 2025-04-28 RX ORDER — METHOTREXATE 25 MG/ML
20 INJECTION INTRA-ARTERIAL; INTRAMUSCULAR; INTRATHECAL; INTRAVENOUS WEEKLY
Qty: 4 EACH | Refills: 1 | Status: SHIPPED | OUTPATIENT
Start: 2025-04-28

## 2025-04-28 NOTE — TELEPHONE ENCOUNTER
LOV: 02/05/2025    Future Appointments   Date Time Provider Department Center   8/7/2025  1:40 PM Adriane Blanton MD [facility] [address]   11/12/2025  8:35 AM Katherine Cameron DO [facility]       LF: 04/09/2025    QTY: 8 mL    Refills: 0    LABS:   Component      Latest Ref Rng 4/26/2025   WBC      4.0 - 11.0 x10(3) uL 5.7    RBC      3.80 - 5.30 x10(6)uL 4.21    Hemoglobin      12.0 - 16.0 g/dL 13.8    Hematocrit      35.0 - 48.0 % 41.6    Platelet Count      150.0 - 450.0 10(3)uL 263.0    MCV      80.0 - 100.0 fL 98.8    MCH      26.0 - 34.0 pg 32.8    MCHC      31.0 - 37.0 g/dL 33.2    RDW      % 12.7    Prelim Neutrophil Abs      1.50 - 7.70 x10 (3) uL 2.79    Neutrophils Absolute      1.50 - 7.70 x10(3) uL 2.79    Lymphocytes Absolute      1.00 - 4.00 x10(3) uL 2.04    Monocytes Absolute      0.10 - 1.00 x10(3) uL 0.53    Eosinophils Absolute      0.00 - 0.70 x10(3) uL 0.31    Basophils Absolute      0.00 - 0.20 x10(3) uL 0.05    Immature Granulocyte Absolute      0.00 - 1.00 x10(3) uL 0.01    Neutrophils %      % 48.7    Lymphocytes %      % 35.6    Monocytes %      % 9.2    Eosinophils %      % 5.4    Basophils %      % 0.9    Immature Granulocyte %      % 0.2    Glucose      70 - 99 mg/dL 82    Sodium      136 - 145 mmol/L 136    Potassium      3.5 - 5.1 mmol/L 3.8    Chloride      98 - 112 mmol/L 102    Carbon Dioxide, Total      21.0 - 32.0 mmol/L 31.0    ANION GAP      0 - 18 mmol/L 3    BUN      9 - 23 mg/dL 10    CREATININE      0.55 - 1.02 mg/dL 0.73    CALCIUM      8.7 - 10.6 mg/dL 9.9    CALCULATED OSMOLALITY      275 - 295 mOsm/kg 280    EGFR      >=60 mL/min/1.73m2 104    AST (SGOT)      <34 U/L 21    ALT (SGPT)      10 - 49 U/L 13    ALKALINE PHOSPHATASE      37 - 98 U/L 65    Total Bilirubin      0.3 - 1.2 mg/dL 0.2 (L)    PROTEIN, TOTAL      5.7 - 8.2 g/dL 6.7    Albumin      3.2 - 4.8 g/dL 4.7    Globulin      2.0 - 3.5 g/dL 2.0    A/G Ratio      1.0 - 2.0  2.4 (H)     Patient Fasting for CMP? Yes    SED RATE      0 - 20 mm/Hr 6       Legend:  (L) Low  (H) High

## 2025-04-28 NOTE — TELEPHONE ENCOUNTER
Last office visit: 2/5/2025      Next Rheum Apt:8/7/2025 Adriane Blanton MD    Last fill: hydroxychloroquine  2/2/2025  180 tabs    Labs:   Lab Results   Component Value Date    CREATSERUM 0.73 04/26/2025    ALKPHO 65 04/26/2025    AST 21 04/26/2025    ALT 13 04/26/2025    BILT 0.2 (L) 04/26/2025    TP 6.7 04/26/2025    ALB 4.7 04/26/2025       Lab Results   Component Value Date    WBC 5.7 04/26/2025    HGB 13.8 04/26/2025    .0 04/26/2025    NEPRELIM 2.79 04/26/2025    NEPERCENT 48.7 04/26/2025    LYPERCENT 35.6 04/26/2025    NE 2.79 04/26/2025    LYMABS 2.04 04/26/2025

## 2025-04-29 LAB — DSDNA IGG SERPL IA-ACNC: 7.1 IU/ML (ref ?–10)

## 2025-05-03 DIAGNOSIS — M79.7 FIBROMYALGIA: Primary | ICD-10-CM

## 2025-05-05 ENCOUNTER — OFFICE VISIT (OUTPATIENT)
Dept: FAMILY MEDICINE CLINIC | Facility: CLINIC | Age: 45
End: 2025-05-05
Payer: COMMERCIAL

## 2025-05-05 VITALS
TEMPERATURE: 99 F | DIASTOLIC BLOOD PRESSURE: 70 MMHG | SYSTOLIC BLOOD PRESSURE: 110 MMHG | RESPIRATION RATE: 18 BRPM | BODY MASS INDEX: 22 KG/M2 | WEIGHT: 143 LBS | OXYGEN SATURATION: 98 % | HEART RATE: 106 BPM

## 2025-05-05 DIAGNOSIS — J11.1 INFLUENZA-LIKE ILLNESS: Primary | ICD-10-CM

## 2025-05-05 PROCEDURE — 99213 OFFICE O/P EST LOW 20 MIN: CPT | Performed by: NURSE PRACTITIONER

## 2025-05-05 RX ORDER — GABAPENTIN 300 MG/1
600 CAPSULE ORAL NIGHTLY
Qty: 180 CAPSULE | Refills: 1 | Status: SHIPPED | OUTPATIENT
Start: 2025-05-05

## 2025-05-05 RX ORDER — GABAPENTIN 300 MG/1
600 CAPSULE ORAL NIGHTLY
Qty: 60 CAPSULE | Refills: 5 | OUTPATIENT
Start: 2025-05-05

## 2025-05-05 RX ORDER — DOXYCYCLINE 100 MG/1
100 CAPSULE ORAL 2 TIMES DAILY
Qty: 14 CAPSULE | Refills: 0 | Status: SHIPPED | OUTPATIENT
Start: 2025-05-07 | End: 2025-05-14

## 2025-05-05 NOTE — TELEPHONE ENCOUNTER
LOV: 02/05/2025    Future Appointments   Date Time Provider Department Center   8/7/2025  1:40 PM Adriane Blanton MD [facility] [address]   11/12/2025  8:35 AM Katherine Cameron DO [facility]       LF: 02/04/2024    QTY: 180    Refills: 0

## 2025-05-05 NOTE — PATIENT INSTRUCTIONS
Self care for viral illnesses:  May use Oscillococcinum as directed on package to reduce length and severity of illness as antiviral medication is not an option. You may also use measures as below to help with comfort at the same time.  Saline nasal spray such as Ocean or Simply Saline to nostrils 2-3 times daily to soothe tissues and keep mucus thin.  Salt water gargles (1 tsp. Salt in 6 oz lukewarm water), use several times daily to remove post nasal drainage and soothe throat.  Hydrate! (cold or hot based on comfort). Drink lots of water or other non dehydrating liquids to help with illness.   Use humidifier in bedroom for congestion. Hot steamy showers can loosen congestion and help cough. Catalino's vapor rub to chest can quiet cough. Keep water by your bed side to sip on if you awaken with cough/sore throat.  Hand washing-use hand  or wash hands frequently, cover your cough or sneeze, do not share towels or drinks with others.  May use Tylenol or Ibuprofen for pain/comfort if not contraindicated.  No work or school until fever free for 24 hours and respiratory symptoms are mostly improved.  Follow up with primary care in two weeks if symptoms not completely resolved post walk in visit, or seek immediate care if symptoms significantly worsen.  If symptoms greatly improved over the next few days then significantly worsen, you may start the Doxycycline twice daily by mouth for one week with a large glass of water. Stay upright for one hour after use and hold Methotrexate during use.

## 2025-05-05 NOTE — PROGRESS NOTES
HPI:   Karol Packer is a 44 year old female who presents with ill symptoms for  3  days. Patient reports sore throat, congestion, cough with yellow colored sputum, cough is not keeping pt up at night, chest pain from coughing, ear pain, sinus pain, denies fever, notes fatigue and body aches . Has tried rest and fluids with not much relief. No known contacts are sick.    Current Outpatient Medications   Medication Sig Dispense Refill    [START ON 5/7/2025] doxycycline 100 MG Oral Cap Take 1 capsule (100 mg total) by mouth 2 (two) times daily for 7 days. 14 capsule 0    hydroxychloroquine 200 MG Oral Tab Take 2 tablets (400 mg total) by mouth daily. 180 tablet 0    Methotrexate Sodium, PF, 50 MG/2ML Injection Solution Inject 0.8 mL (20 mg total) into the skin once a week. Inject 20 mg as directed 4 each 1    BD SYRINGE SLIP TIP 25G X 5/8\" 1 ML Does not apply Misc TO BE USED FOR METHOTREXATE INJECTION ONCE WEEKLY 16 each 3    cyclobenzaprine 10 MG Oral Tab Take 1 tablet (10 mg total) by mouth 3 (three) times daily as needed for Muscle spasms.      hydroCHLOROthiazide 12.5 MG Oral Cap Take 1 capsule (12.5 mg total) by mouth every morning. 90 capsule 1    montelukast 10 MG Oral Tab Take 1 tablet (10 mg total) by mouth nightly. 90 tablet 1    Fremanezumab-vfrm (AJOVY) 225 MG/1.5ML Subcutaneous Solution Auto-injector Inject 225 mg into the skin every 30 (thirty) days. 3 each 11    SUMATRIPTAN 50 MG Oral Tab USE AT ONSET REPEAT ONCE AFTER 2 HOURS-ONLY 2 IN 24 HR MAX. THIS IS A 30 DAY SUPPLY. 9 tablet 2    FOLIC ACID 1 MG Oral Tab TAKE 2 TABLETS BY MOUTH EVERY  tablet 1    diclofenac 75 MG Oral Tab EC Take 1 tablet (75 mg total) by mouth 2 (two) times daily.      nortriptyline 10 MG Oral Cap Take 2 capsules (20 mg total) by mouth nightly. 60 capsule 2    atomoxetine 60 MG Oral Cap Take 1 capsule (60 mg total) by mouth daily.      gabapentin 300 MG Oral Cap Take 2 capsules (600 mg total) by mouth nightly. 60  capsule 5    gabapentin 100 MG Oral Cap Take 2 capsules at bedtime for a total of 800 mg nightly. 180 capsule 5    Syringe/Needle, Disp, (BD SAFETYGLIDE SYRINGE/NEEDLE) 27G X 5/8\" 1 ML Does not apply Misc To be used for Methotrexate injection once weekly 16 each 3    traZODone 50 MG Oral Tab Take 1 tablet (50 mg total) by mouth. 0.5-1 tablet by mouth nightly as needed      albuterol (PROAIR HFA) 108 (90 Base) MCG/ACT Inhalation Aero Soln Inhale 2 puffs into the lungs every 6 (six) hours as needed for Wheezing or Shortness of Breath (Cough). 1 each 0    busPIRone HCl 15 MG Oral Tab Take by mouth 2 (two) times a day.        Cholecalciferol (VITAMIN D3) 125 MCG (5000 UT) Oral Tab Take 1 tablet (5,000 Units total) by mouth daily.      loratadine 10 MG Oral Tab Take 1 tablet (10 mg total) by mouth daily.      Sertraline HCl 100 MG Oral Tab Take 2 tablets (200 mg total) by mouth daily. Take 2 tab once a day      Fluticasone Propionate 50 MCG/ACT Nasal Suspension 2 sprays by Each Nare route daily. 1 Bottle 3     No current facility-administered medications for this visit.      Past Medical History[1]   Past Surgical History[2]   Family History[3]   Short Social Hx on File[4]      REVIEW OF SYSTEMS:   GENERAL: feels well otherwise, fatigued with body aches as above  HEENT: congested, as above in HPI  LUNGS: denies shortness of breath with exertion, cough as above  CARDIOVASCULAR: denies chest pain on exertion  GI: no nausea or abdominal pain, appetite down  NEURO: admits to headaches    EXAM:   /70   Pulse 106   Temp 98.6 °F (37 °C) (Oral)   Resp 18   Wt 143 lb (64.9 kg)   LMP 04/21/2025 (Approximate)   SpO2 98%   BMI 22.40 kg/m²   GENERAL: well developed, well nourished,in no apparent distress, appears congested but non toxic appearing  HEENT: atraumatic, normocephalic,ears full and clear, nares with positive rhinorrhea, throat pink and clear. Uvuvla midline.  Mild maxillary sinus tenderness with  palpation.  NECK: supple,positive anterior and posterior cervical adenopathy  LUNGS: clear to auscultation all lobes, breathing is non labored  CARDIO: RRR without murmur  No labs performed today.    ASSESSMENT AND PLAN:    PLAN:Tucker was seen today for sinus problem.    Diagnoses and all orders for this visit:    Influenza-like illness  -     doxycycline 100 MG Oral Cap; Take 1 capsule (100 mg total) by mouth 2 (two) times daily for 7 days.      Doxy to hold. Self care discussed. Medication use and risk/benefit discussed. Patient is advised to follow up with PCP if not improving with treatment plan or seek immediate care if symptoms worsen.  The patient indicates understanding of these issues and agrees to the plan.  Patient Instructions   Self care for viral illnesses:  May use Oscillococcinum as directed on package to reduce length and severity of illness as antiviral medication is not an option. You may also use measures as below to help with comfort at the same time.  Saline nasal spray such as Ocean or Simply Saline to nostrils 2-3 times daily to soothe tissues and keep mucus thin.  Salt water gargles (1 tsp. Salt in 6 oz lukewarm water), use several times daily to remove post nasal drainage and soothe throat.  Hydrate! (cold or hot based on comfort). Drink lots of water or other non dehydrating liquids to help with illness.   Use humidifier in bedroom for congestion. Hot steamy showers can loosen congestion and help cough. Catalino's vapor rub to chest can quiet cough. Keep water by your bed side to sip on if you awaken with cough/sore throat.  Hand washing-use hand  or wash hands frequently, cover your cough or sneeze, do not share towels or drinks with others.  May use Tylenol or Ibuprofen for pain/comfort if not contraindicated.  No work or school until fever free for 24 hours and respiratory symptoms are mostly improved.  Follow up with primary care in two weeks if symptoms not completely resolved  post walk in visit, or seek immediate care if symptoms significantly worsen.  If symptoms greatly improved over the next few days then significantly worsen, you may start the Doxycycline twice daily by mouth for one week with a large glass of water. Stay upright for one hour after use and hold Methotrexate during use.                 [1]   Past Medical History:   Acute bronchitis due to severe acute respiratory syndrome coronavirus 2 (SARS-CoV-2)    Asthma (HCC)    Cervical polyp    Depression    Hip pain, acute, right    In area of anterior superior iliac spine    Lupus    Migraines    Status post bilateral salpingectomy    Unintentional weight loss    As of 2021 maintaining weight since May or 2021   [2]   Past Surgical History:  Procedure Laterality Date    Colonoscopy N/A 2021    Procedure: COLONOSCOPY;  Surgeon: Jose Heard DO;  Location:  ENDOSCOPY    Colposcopy, cervix inc upper/adjacent vagina      Colposcopy,loop electrd cervix excis      Conization cervix,loop electrd      Hip arthroscopy  2024    Dr. Arnold right hip arthroscopy with labral repair and capsular closure on 2024    Salpingectomy Bilateral 2020    Laparoscopy    Tonsillectomy     [3]   Family History  Problem Relation Age of Onset    Other (acute MI[other]) Maternal Grandfather     Heart Disorder Maternal Grandfather     Other (HTN[other]) Maternal Grandmother     Other (HTN[other]) Mother     Other (hypothyroidism[other]) Mother     Breast Cancer Mother         63 years ols    Stroke Father         59 years old   [4]   Social History  Socioeconomic History    Marital status: Single   Tobacco Use    Smoking status: Former     Current packs/day: 0.00     Average packs/day: 0.5 packs/day for 24.0 years (12.0 ttl pk-yrs)     Types: Cigarettes     Start date: 1995     Quit date: 2019     Years since quittin.3    Smokeless tobacco: Never   Vaping Use    Vaping status: Never Used   Substance and  Sexual Activity    Alcohol use: Yes     Comment: social    Drug use: No    Sexual activity: Not Currently   Other Topics Concern    Caffeine Concern Yes     Comment: 2 cups of coffee daily    Exercise No     Comment: Daily walking    Seat Belt Yes

## 2025-06-13 ENCOUNTER — PATIENT MESSAGE (OUTPATIENT)
Dept: OBGYN CLINIC | Facility: CLINIC | Age: 45
End: 2025-06-13

## 2025-06-15 DIAGNOSIS — G43.009 MIGRAINE WITHOUT AURA AND WITHOUT STATUS MIGRAINOSUS, NOT INTRACTABLE: ICD-10-CM

## 2025-06-16 RX ORDER — SUMATRIPTAN 50 MG/1
TABLET, FILM COATED ORAL
Qty: 9 TABLET | Refills: 2 | Status: SHIPPED | OUTPATIENT
Start: 2025-06-16

## 2025-06-16 NOTE — TELEPHONE ENCOUNTER
Medication: SUMATRIPTAN 50 MG Oral Tab      Date of last refill: 3/5/2025 (#9/2)  [place]     Last office visit: 3/12/2025  Due back to clinic per last office note:  8 months  Date next office visit scheduled:    Future Appointments   Date Time Provider Department Center   8/7/2025  1:40 PM Adriane Blanton MD [facility] [address]   11/12/2025  8:35 AM Katherine Cameron DO [facility]           Last OV note recommendation:    Discussion/Plan:  Migraines without aura  Has failed topamax and nortriptyline   Continue Ajovy 225mg monthly  Continue sumatriptan 50mg at onset of migraines, not more than 2-3 times per week     Insomnia-  Back on trazodone 75mg nightly  On buspar 15mg BID no zoloft 200mg daily  Continue magnesium glycinate  Has anxiety, depression, and ADHD- on      Nonspecific mild white matter linear, very small, left periventricular T2 hyperintensity- likely nonspecific microischemic white matter abnormality, possibly related to migraines. Repeat MRI showed no changes, radiologist did not mention this abnormality, I did not see it on the repeat MRI brain        Pineal cyst, 9mm- stable from 2020 to 2024.        Word finding difficulty-. Improved since stopping topamax but still present occasionally. B12 rechecked 2024, 427        Requested Prescriptions             Signed Prescriptions Disp Refills    Fremanezumab-vfrm (AJOVY) 225 MG/1.5ML Subcutaneous Solution Auto-injector 3 each 11       Sig: Inject 225 mg into the skin every 30 (thirty) days.            We discussed in depth regarding the diagnosis, prognosis, treatment. The patient was given ample opportunity to ask questions. All questions and concerns were addressed.

## 2025-06-26 ENCOUNTER — HOSPITAL ENCOUNTER (OUTPATIENT)
Dept: MAMMOGRAPHY | Age: 45
Discharge: HOME | End: 2025-06-26
Attending: OBSTETRICS & GYNECOLOGY
Payer: COMMERCIAL

## 2025-06-26 DIAGNOSIS — Z12.31 ENCOUNTER FOR SCREENING MAMMOGRAM FOR MALIGNANT NEOPLASM OF BREAST: ICD-10-CM

## 2025-06-26 PROCEDURE — 77067 SCR MAMMO BI INCL CAD: CPT | Performed by: OBSTETRICS & GYNECOLOGY

## 2025-06-26 PROCEDURE — 77063 BREAST TOMOSYNTHESIS BI: CPT | Performed by: OBSTETRICS & GYNECOLOGY

## 2025-07-12 ENCOUNTER — LAB ENCOUNTER (OUTPATIENT)
Dept: LAB | Age: 45
End: 2025-07-12
Attending: INTERNAL MEDICINE
Payer: COMMERCIAL

## 2025-07-12 DIAGNOSIS — Z51.81 THERAPEUTIC DRUG MONITORING: ICD-10-CM

## 2025-07-12 DIAGNOSIS — M79.7 FIBROMYALGIA: ICD-10-CM

## 2025-07-12 LAB
ALBUMIN SERPL-MCNC: 4.6 G/DL (ref 3.2–4.8)
ALBUMIN/GLOB SERPL: 2.1 {RATIO} (ref 1–2)
ALP LIVER SERPL-CCNC: 61 U/L (ref 37–98)
ALT SERPL-CCNC: 33 U/L (ref 10–49)
ANION GAP SERPL CALC-SCNC: 9 MMOL/L (ref 0–18)
AST SERPL-CCNC: 28 U/L (ref ?–34)
BASOPHILS # BLD AUTO: 0.03 X10(3) UL (ref 0–0.2)
BASOPHILS NFR BLD AUTO: 0.5 %
BILIRUB SERPL-MCNC: 0.3 MG/DL (ref 0.3–1.2)
BUN BLD-MCNC: 11 MG/DL (ref 9–23)
CALCIUM BLD-MCNC: 9.8 MG/DL (ref 8.7–10.6)
CHLORIDE SERPL-SCNC: 101 MMOL/L (ref 98–112)
CO2 SERPL-SCNC: 29 MMOL/L (ref 21–32)
CREAT BLD-MCNC: 0.77 MG/DL (ref 0.55–1.02)
EGFRCR SERPLBLD CKD-EPI 2021: 97 ML/MIN/1.73M2 (ref 60–?)
EOSINOPHIL # BLD AUTO: 0.24 X10(3) UL (ref 0–0.7)
EOSINOPHIL NFR BLD AUTO: 3.8 %
ERYTHROCYTE [DISTWIDTH] IN BLOOD BY AUTOMATED COUNT: 14.8 %
ERYTHROCYTE [SEDIMENTATION RATE] IN BLOOD: 3 MM/HR (ref 0–20)
FASTING STATUS PATIENT QL REPORTED: NO
GLOBULIN PLAS-MCNC: 2.2 G/DL (ref 2–3.5)
GLUCOSE BLD-MCNC: 78 MG/DL (ref 70–99)
HCT VFR BLD AUTO: 39.1 % (ref 35–48)
HGB BLD-MCNC: 12.8 G/DL (ref 12–16)
IMM GRANULOCYTES # BLD AUTO: 0.03 X10(3) UL (ref 0–1)
IMM GRANULOCYTES NFR BLD: 0.5 %
LYMPHOCYTES # BLD AUTO: 1.89 X10(3) UL (ref 1–4)
LYMPHOCYTES NFR BLD AUTO: 30 %
MCH RBC QN AUTO: 32.7 PG (ref 26–34)
MCHC RBC AUTO-ENTMCNC: 32.7 G/DL (ref 31–37)
MCV RBC AUTO: 100 FL (ref 80–100)
MONOCYTES # BLD AUTO: 0.61 X10(3) UL (ref 0.1–1)
MONOCYTES NFR BLD AUTO: 9.7 %
NEUTROPHILS # BLD AUTO: 3.5 X10 (3) UL (ref 1.5–7.7)
NEUTROPHILS # BLD AUTO: 3.5 X10(3) UL (ref 1.5–7.7)
NEUTROPHILS NFR BLD AUTO: 55.5 %
OSMOLALITY SERPL CALC.SUM OF ELEC: 286 MOSM/KG (ref 275–295)
PLATELET # BLD AUTO: 275 10(3)UL (ref 150–450)
POTASSIUM SERPL-SCNC: 3.7 MMOL/L (ref 3.5–5.1)
PROT SERPL-MCNC: 6.8 G/DL (ref 5.7–8.2)
RBC # BLD AUTO: 3.91 X10(6)UL (ref 3.8–5.3)
SODIUM SERPL-SCNC: 139 MMOL/L (ref 136–145)
WBC # BLD AUTO: 6.3 X10(3) UL (ref 4–11)

## 2025-07-12 PROCEDURE — 80053 COMPREHEN METABOLIC PANEL: CPT

## 2025-07-12 PROCEDURE — 86225 DNA ANTIBODY NATIVE: CPT

## 2025-07-12 PROCEDURE — 36415 COLL VENOUS BLD VENIPUNCTURE: CPT

## 2025-07-12 PROCEDURE — 85025 COMPLETE CBC W/AUTO DIFF WBC: CPT

## 2025-07-12 PROCEDURE — 85652 RBC SED RATE AUTOMATED: CPT

## 2025-07-15 LAB — DSDNA IGG SERPL IA-ACNC: 4.7 IU/ML (ref ?–10)

## 2025-07-25 DIAGNOSIS — M06.09 RHEUMATOID ARTHRITIS OF MULTIPLE SITES WITHOUT RHEUMATOID FACTOR (HCC): ICD-10-CM

## 2025-07-25 NOTE — TELEPHONE ENCOUNTER
LOV: 02/05/2025    Future Appointments   Date Time Provider Department Center   7/29/2025 12:40 PM [facility] [place]   8/7/2025  1:40 PM Adriane Blanton MD [facility] [address]   11/12/2025  8:35 AM Katherine Cameron DO [facility] [place]       LF: 04/29/2025           QTY: 180           Refills: 0    EYE EXAM: 01/16/2025

## 2025-07-28 RX ORDER — HYDROXYCHLOROQUINE SULFATE 200 MG/1
400 TABLET, FILM COATED ORAL DAILY
Qty: 180 TABLET | Refills: 2 | Status: SHIPPED | OUTPATIENT
Start: 2025-07-28

## 2025-07-29 ENCOUNTER — HOSPITAL ENCOUNTER (OUTPATIENT)
Dept: ULTRASOUND IMAGING | Age: 45
Discharge: HOME | End: 2025-07-29
Attending: OBSTETRICS & GYNECOLOGY

## 2025-07-29 ENCOUNTER — HOSPITAL ENCOUNTER (OUTPATIENT)
Dept: MAMMOGRAPHY | Age: 45
Discharge: HOME | End: 2025-07-29
Attending: OBSTETRICS & GYNECOLOGY

## 2025-07-29 DIAGNOSIS — R92.2 INCONCLUSIVE MAMMOGRAM: ICD-10-CM

## 2025-07-29 PROCEDURE — 76642 ULTRASOUND BREAST LIMITED: CPT | Performed by: OBSTETRICS & GYNECOLOGY

## 2025-07-29 PROCEDURE — 77061 BREAST TOMOSYNTHESIS UNI: CPT | Performed by: OBSTETRICS & GYNECOLOGY

## 2025-07-29 PROCEDURE — 77065 DX MAMMO INCL CAD UNI: CPT | Performed by: OBSTETRICS & GYNECOLOGY

## 2025-07-30 ENCOUNTER — PATIENT MESSAGE (OUTPATIENT)
Dept: FAMILY MEDICINE CLINIC | Facility: CLINIC | Age: 45
End: 2025-07-30

## 2025-07-30 DIAGNOSIS — M25.851 FEMOROACETABULAR IMPINGEMENT OF RIGHT HIP: Primary | ICD-10-CM

## 2025-07-30 DIAGNOSIS — G89.29 OTHER CHRONIC PAIN: ICD-10-CM

## 2025-07-31 ENCOUNTER — TELEPHONE (OUTPATIENT)
Dept: RHEUMATOLOGY | Facility: CLINIC | Age: 45
End: 2025-07-31

## 2025-07-31 DIAGNOSIS — G89.4 CHRONIC PAIN SYNDROME: Primary | ICD-10-CM

## 2025-08-06 ENCOUNTER — PATIENT MESSAGE (OUTPATIENT)
Dept: RHEUMATOLOGY | Facility: CLINIC | Age: 45
End: 2025-08-06

## 2025-08-13 DIAGNOSIS — M06.09 RHEUMATOID ARTHRITIS OF MULTIPLE SITES WITHOUT RHEUMATOID FACTOR (HCC): ICD-10-CM

## 2025-08-13 DIAGNOSIS — M06.09 RHEUMATOID ARTHRITIS OF MULTIPLE SITES WITH NEGATIVE RHEUMATOID FACTOR (HCC): ICD-10-CM

## 2025-08-13 RX ORDER — FOLIC ACID 1 MG/1
2 TABLET ORAL DAILY
Qty: 180 TABLET | Refills: 1 | Status: SHIPPED | OUTPATIENT
Start: 2025-08-13

## 2025-08-18 ENCOUNTER — OFFICE VISIT (OUTPATIENT)
Dept: PAIN CLINIC | Facility: CLINIC | Age: 45
End: 2025-08-18

## 2025-08-18 ENCOUNTER — TELEPHONE (OUTPATIENT)
Dept: PAIN CLINIC | Facility: CLINIC | Age: 45
End: 2025-08-18

## 2025-08-18 VITALS
SYSTOLIC BLOOD PRESSURE: 118 MMHG | OXYGEN SATURATION: 96 % | BODY MASS INDEX: 21.97 KG/M2 | HEART RATE: 107 BPM | HEIGHT: 67 IN | WEIGHT: 140 LBS | DIASTOLIC BLOOD PRESSURE: 80 MMHG

## 2025-08-18 DIAGNOSIS — M25.551 RIGHT HIP PAIN: Primary | ICD-10-CM

## 2025-08-18 RX ORDER — GABAPENTIN 100 MG/1
CAPSULE ORAL
Qty: 60 CAPSULE | Refills: 0 | Status: SHIPPED | OUTPATIENT
Start: 2025-08-18

## 2025-08-25 ENCOUNTER — HOSPITAL ENCOUNTER (OUTPATIENT)
Dept: GENERAL RADIOLOGY | Age: 45
Discharge: HOME | End: 2025-08-25
Attending: NURSE PRACTITIONER

## 2025-08-25 DIAGNOSIS — M25.551 RIGHT HIP PAIN: ICD-10-CM

## 2025-08-25 PROCEDURE — 73502 X-RAY EXAM HIP UNI 2-3 VIEWS: CPT | Performed by: NURSE PRACTITIONER

## (undated) DEVICE — INSUFFLATION NEEDLE TO ESTABLISH PNEUMOPERITONEUM.: Brand: INSUFFLATION NEEDLE

## (undated) DEVICE — SUT MCRYL 3-0 27IN ABSRB UD 19MM PS-2 3/8

## (undated) DEVICE — LIGHT HANDLE

## (undated) DEVICE — XL, ARTHROSCOPIC SHAVER BLADES, DIAMOND ROUND BUR.  DO NOT RESTERILIZE, DO NOT USE IF PACKAGE IS DAMAGED, KEEP DRY, KEEP AWAY FROM SUNLIGHT: Brand: CROSSBLADE

## (undated) DEVICE — COVER,LIGHT,CAMERA,HARD,1/PK,STRL: Brand: MEDLINE

## (undated) DEVICE — DERMABOND LIQUID ADHESIVE

## (undated) DEVICE — MEDI-VAC NON-CONDUCTIVE SUCTION TUBING: Brand: CARDINAL HEALTH

## (undated) DEVICE — GLOVE SUR 8 SENSICARE PI PIP GRN PWD F

## (undated) DEVICE — PADDING CAST 4INX4YD 100% COT SFT SLF BOND

## (undated) DEVICE — NANOPASS REACH CRESCENT: Brand: NANOPASS

## (undated) DEVICE — 50-S SWEEP + XL, SUCTION PROBE, NON-BENDABLE, XL/HIP LENGTH: Brand: SERFAS ENERGY

## (undated) DEVICE — STERILE POLYISOPRENE POWDER-FREE SURGICAL GLOVES: Brand: PROTEXIS

## (undated) DEVICE — TROCAR: Brand: KII® SLEEVE

## (undated) DEVICE — SOL  .9 1000ML BTL

## (undated) DEVICE — KENDALL SCD EXPRESS SLEEVES, KNEE LENGTH, MEDIUM: Brand: KENDALL SCD

## (undated) DEVICE — HIP INSTRUMENTATION SYS USEAGE

## (undated) DEVICE — MEDI-VAC SUCTION HANDLE REGULAR CAPACITY: Brand: CARDINAL HEALTH

## (undated) DEVICE — Device: Brand: DEFENDO AIR/WATER/SUCTION AND BIOPSY VALVE

## (undated) DEVICE — #11 STERILE BLADE: Brand: POLYMER COATED BLADES

## (undated) DEVICE — UNDYED BRAIDED (POLYGLACTIN 910), SYNTHETIC ABSORBABLE SUTURE: Brand: COATED VICRYL

## (undated) DEVICE — GLOVE SUR 8 SENSICARE PI PIP CRM PWD F

## (undated) DEVICE — SOLUTION IRRIG 3000ML 0.9% NACL FLX CONT

## (undated) DEVICE — FLOWPORT II CANNULA WITH OBTURATOR STRYKER 165MM: Brand: FLOWPORT

## (undated) DEVICE — SLINGSHOT 70 UP: Brand: SLINGSHOT

## (undated) DEVICE — TUBING PMP L16FT DISP INFLOW

## (undated) DEVICE — INJECTOR II NEEDLE CARTRIDGE: Brand: INJECTOR

## (undated) DEVICE — 1200CC GUARDIAN II: Brand: GUARDIAN

## (undated) DEVICE — 3M™ RED DOT™ MONITORING ELECTRODE WITH FOAM TAPE AND STICKY GEL, 50/BAG, 20/CASE, 72/PLT 2570: Brand: RED DOT™

## (undated) DEVICE — Device

## (undated) DEVICE — FORCEP BIOPSY RJ4 LG CAP W/ND

## (undated) DEVICE — HANDLE LIGHT ECONOMY

## (undated) DEVICE — TROCAR: Brand: KII FIOS FIRST ENTRY

## (undated) DEVICE — ENDOSCOPY PACK - LOWER: Brand: MEDLINE INDUSTRIES, INC.

## (undated) DEVICE — SUT ETHLN 3-0 18IN PS-1 NABSRB BLK 24MM 3/8 C

## (undated) DEVICE — ENDOSCOPY PACK UPPER: Brand: MEDLINE INDUSTRIES, INC.

## (undated) DEVICE — SAMURAI BLADE FULL RADIUS: Brand: SAMURAI

## (undated) DEVICE — XL TOMCAT, HIP CUTTER. ARTHROSCOPIC SHAVER BLADE. FOR USE WITH: REF 0375-701-500, 0375-704-500, 0375-708-500. DO NOT USE IF PACKAGE IS DAMAGED, KEEP DRY, KEEP AWAY FROM SUNLIGHT: Brand: FORMULA

## (undated) DEVICE — SLEEVE COMPR MD KNEE LEN SGL USE KENDALL SCD

## (undated) DEVICE — 3M™ STERI-STRIP™ REINFORCED ADHESIVE SKIN CLOSURES, R1547, 1/2 IN X 4 IN (12 MM X 100 MM), 6 STRIPS/ENVELOPE: Brand: 3M™ STERI-STRIP™

## (undated) DEVICE — CINCHLOCK SS DRILL BIT: Brand: CINCHLOCK

## (undated) DEVICE — COVER,MAYO STAND,STERILE: Brand: MEDLINE

## (undated) DEVICE — PORTAL ENTRY KIT

## (undated) DEVICE — BANDAGE ACE COMP 4INX5YD UNSTERILE

## (undated) DEVICE — BANDAGE,COHESIVE,TAN,4X5YD,LF,STRL: Brand: MEDLINE

## (undated) DEVICE — 40580 - THE PINK PAD - ADVANCED TRENDELENBURG POSITIONING KIT: Brand: 40580 - THE PINK PAD - ADVANCED TRENDELENBURG POSITIONING KIT

## (undated) DEVICE — C-ARM: Brand: UNBRANDED

## (undated) DEVICE — SUT ZIPLINE 2 36IN NABSRB BLK WHT COBRAID

## (undated) DEVICE — [HIGH FLOW INSUFFLATOR,  DO NOT USE IF PACKAGE IS DAMAGED,  KEEP DRY,  KEEP AWAY FROM SUNLIGHT,  PROTECT FROM HEAT AND RADIOACTIVE SOURCES.]: Brand: PNEUMOSURE

## (undated) DEVICE — FILTERLINE NASAL ADULT O2/CO2

## (undated) DEVICE — OCCLUSIVE GAUZE STRIP OVERWRAP,3% BISMUTH TRIBROMOPHENATE IN PETROLATUM BLEND: Brand: XEROFORM

## (undated) DEVICE — GYN LAP/ROBOTIC: Brand: MEDLINE INDUSTRIES, INC.

## (undated) DEVICE — TRANSPORT CANNULA 8MM LENGTH 4, 5, 6: Brand: TRANSPORT

## (undated) DEVICE — PADDING CAST 6INX4YD 100% COT ABSRB HIGHLY

## (undated) DEVICE — PLUMEPORT ACTIV LAPAROSCOPIC SMOKE FILTRATION DEVICE: Brand: PLUMEPORT ACTIVE

## (undated) DEVICE — PACK PBDS HIP PINNING

## (undated) DEVICE — GLOVE SUR 7.5 SENSICARE PI PIP CRM PWD F

## (undated) DEVICE — LAP LIGASURE 5MM BLUNT

## (undated) DEVICE — NEEDLE SPNL 18GA L3.5IN PNK QNCKE STYL DISP

## (undated) NOTE — LETTER
Date: 4/28/2023    Patient Name: Ismael Samayoa          To Whom it may concern: The above patient was seen at the [facility] for evaluation prior to starting stimulant medication. This patient does not have any known cardiac conditions. Her ECG in the office was normal/benign. This patient is low risk for cardiac complications with use of stimulant medication to treat ADHD.         Sincerely,    Zulay Mckinney, DO

## (undated) NOTE — Clinical Note
02/06/2017        Sravanthi Diaz  [address] [facility]      Dear Bandar Liter records indicate that you have outstanding lab work and or testing that was ordered for you and has not yet been completed:      LIPID PANEL  TSH  T4 OTONIEL

## (undated) NOTE — LETTER
Karol Packer, XLO:[id number]    CONSENT FOR PROCEDURE/SEDATION    1. I authorize the performance upon Lisy Schwartz  the following: Polypectomy    2.  I authorize Dr. Christy Pineda MD (and whomever is designated as the doctor’s assistant), to pe Witness: _________________________________________ Date:___________     Physician Signature: _______________________________ Date:___________

## (undated) NOTE — LETTER
Patient Name: Sharyn Noyola  : 1980  MRN: [id number]  Patient Address: [address] [place] [id number]      Coronavirus Disease 2019 (COVID-19)     [facility] is committed to the safety and well-being of our patients, member carefully. If your symptoms get worse, call your healthcare provider immediately. 3. Get rest and stay hydrated.    4. If you have a medical appointment, call the healthcare provider ahead of time and tell them that you have or may have COVID-19.  5. For m of fever-reducing medications; and  · Improvement in respiratory symptoms (e.g., cough, shortness of breath); and  · At least 10 days have passed since symptoms first appeared OR if asymptomatic patient or date of symptom onset is unclear then use 10 days donors must:    · Have had a confirmed diagnosis of COVID-19  · Be symptom-free for at least 14 days*    *Some people will be required to have a repeat COVID-19 test in order to be eligible to donate.  If you’re instructed by Aisha that a repeat test is r random. Researchers are trying to identify similarities between people with a Post-COVID condition to better understand if there are risk factors. How do I prevent a Post-COVID condition?   The best way to prevent the long-term symptoms of COVID-19 is

## (undated) NOTE — MR AVS SNAPSHOT
[facility] Nnamdi BullockShaquille villar [facility]  [phone]               Thank you for choosing us for your health care visit with Suzanne Booker DO.   We are glad to serve you and happy to provide you with this s Mammogram: as often as your healthcare provider recommends if you are 22years old or older. When you should start having these exams and how often depends on your level of risk for breast cancer.  If your risk is high, your provider may recommend MRI scree infections, including if you have a new sex partner or more than 1 partner, a history of STDs, a partner with an STD, or a partner who is bisexual   HIV test: The CDC (Centers for Disease Control) recommends a routine HIV test for people age 15 to 72 at th especially recommended for adults caring for children, even if it has been less than 10 years since your last tetanus booster.    Flu shot every fall if you are 50 or older, you have a high risk for complications from the flu, or you might spread the flu to Injury prevention: Use lap and shoulder belts when you drive. Use a helmet when you ride a motorcycle or bicycle. If you are around guns or other firearms, practice safe handling and make sure to keep them in locked cabinets when they are not in use.  Nati Gratn What changed:  See the new instructions.    Commonly known as:  CAMRESE                Where to Get Your Medications      These medications were sent to [address] [facility] [phone], [place] [address] [email or web address]

## (undated) NOTE — MR AVS SNAPSHOT
[address] [facility] [place]qu[place] Isaac [facility]  [phone]               Thank you for choosing us for your health care visit with Jose Tee MD.  We are glad to serve you and happy to provide you with this brito Commonly known as:  LEXAPRO           gabapentin 300 MG Caps   Take 1 capsule by mouth nightly. Commonly known as:  NEURONTIN           Hydroxychloroquine Sulfate 200 MG Tabs   Take 2 tablets by mouth Every morning.    Commonly known as:  PLAQUENIL